# Patient Record
Sex: MALE | Race: WHITE | Employment: OTHER | URBAN - METROPOLITAN AREA
[De-identification: names, ages, dates, MRNs, and addresses within clinical notes are randomized per-mention and may not be internally consistent; named-entity substitution may affect disease eponyms.]

---

## 2017-08-07 ENCOUNTER — TRANSFERRED RECORDS (OUTPATIENT)
Dept: HEALTH INFORMATION MANAGEMENT | Facility: CLINIC | Age: 62
End: 2017-08-07

## 2019-09-20 ENCOUNTER — TRANSFERRED RECORDS (OUTPATIENT)
Dept: HEALTH INFORMATION MANAGEMENT | Facility: CLINIC | Age: 64
End: 2019-09-20

## 2019-09-30 ENCOUNTER — TRANSFERRED RECORDS (OUTPATIENT)
Dept: HEALTH INFORMATION MANAGEMENT | Facility: CLINIC | Age: 64
End: 2019-09-30

## 2019-09-30 ENCOUNTER — DOCUMENTATION ONLY (OUTPATIENT)
Dept: CARE COORDINATION | Facility: CLINIC | Age: 64
End: 2019-09-30

## 2019-10-01 NOTE — TELEPHONE ENCOUNTER
MEDICAL RECORDS REQUEST   [facility]  Urology Clinic  [address] [place] [address]  PHONE: [phone]  Fax: [phone]        FUTURE VISIT INFORMATION                                                   Zander Stallworth, : 1955 scheduled for future visit at [facility]    APPOINTMENT INFORMATION:    Date: 10/03/19 3PM     Provider:  Ousmane Ronquillo MD    Reason for Visit/Diagnosis: Second opinion for prostate CA & Elevated PSA     REFERRAL INFORMATION:    Referring provider:  N/A     Specialty: N/A    Referring providers clinic:  N/A    Clinic contact number:  N/A     RECORDS REQUESTED FOR VISIT                                                     NOTES  STATUS/DETAILS   OFFICE NOTE from referring provider  no   OFFICE NOTE from other specialist  yes   DISCHARGE SUMMARY from hospital  yes   DISCHARGE REPORT from the ER  yes   OPERATIVE REPORT  yes   MEDICATION LIST  yes   PROSTATE CANCER     BONE SCAN  in process   MRI OF PROSTATE  no   PATHOLOGY REPORT & SLIDES  in process   PSA (LAB)  Yes      PRE-VISIT CHECKLIST      Record collection complete If no, please explain: Called and spoke with pt for verbal consent for HP recs. (CE)   Fax to [facility] for slides and images - 10/1  Images at [facility]  Slides - Dr. Davies 2017   [place] #: [id number], [id number]      Appointment appropriately scheduled           (right time/right provider) Yes   MyChart activation If no, please explain: In process    Questionnaire complete If no, please explain: In process      Completed by: Ev Barrientos

## 2019-10-02 ENCOUNTER — TRANSFERRED RECORDS (OUTPATIENT)
Dept: HEALTH INFORMATION MANAGEMENT | Facility: CLINIC | Age: 64
End: 2019-10-02

## 2019-10-03 ENCOUNTER — OFFICE VISIT (OUTPATIENT)
Dept: UROLOGY | Facility: CLINIC | Age: 64
End: 2019-10-03
Payer: MEDICARE

## 2019-10-03 VITALS
DIASTOLIC BLOOD PRESSURE: 67 MMHG | HEIGHT: 70 IN | HEART RATE: 52 BPM | WEIGHT: 178 LBS | BODY MASS INDEX: 25.48 KG/M2 | SYSTOLIC BLOOD PRESSURE: 116 MMHG

## 2019-10-03 DIAGNOSIS — C61 MALIGNANT NEOPLASM OF PROSTATE (H): Primary | ICD-10-CM

## 2019-10-03 RX ORDER — FLUTICASONE PROPIONATE 50 MCG
2 SPRAY, SUSPENSION (ML) NASAL DAILY
COMMUNITY
Start: 2019-03-01

## 2019-10-03 RX ORDER — HYDROCORTISONE 2.5 %
CREAM (GRAM) TOPICAL
COMMUNITY
Start: 2018-07-25 | End: 2019-12-17

## 2019-10-03 RX ORDER — FEXOFENADINE HCL 60 MG/1
60 TABLET, FILM COATED ORAL EVERY MORNING
COMMUNITY
Start: 2010-09-22

## 2019-10-03 RX ORDER — TADALAFIL 5 MG/1
TABLET ORAL
Refills: 9 | COMMUNITY
Start: 2019-02-20 | End: 2020-12-16

## 2019-10-03 RX ORDER — SIMVASTATIN 10 MG
TABLET ORAL AT BEDTIME
COMMUNITY
Start: 2019-09-24

## 2019-10-03 ASSESSMENT — MIFFLIN-ST. JEOR: SCORE: 1598.65

## 2019-10-03 ASSESSMENT — PAIN SCALES - GENERAL: PAINLEVEL: NO PAIN (0)

## 2019-10-03 NOTE — LETTER
Date:October 16, 2019      Patient was self referred, no letter generated. Do not send.        [facility] Information

## 2019-10-03 NOTE — NURSING NOTE
"Chief Complaint   Patient presents with     Consult     Second opinion for prostate CA & Elevated PSA        Blood pressure 116/67, pulse 52, height 1.778 m (5' 10\"), weight 80.7 kg (178 lb). Body mass index is 25.54 kg/m .    Patient Active Problem List   Diagnosis     Acquired deformity of ankle and foot     Blepharitis     Dry eyes     Herpes simplex disciform keratitis     Hypercholesterolemia     Lesion of plantar nerve     Malabsorption of glucose     Malignant neoplasm of prostate (H)     Metatarsalgia     Presbyopia     Sensorineural hearing loss, bilateral       Allergies   Allergen Reactions     Amoxicillin-Pot Clavulanate Other (See Comments)     Itching      Erythromycin      Sildenafil Other (See Comments)     Visual changes with sildenafil.        Current Outpatient Medications   Medication Sig Dispense Refill     fexofenadine (ALLEGRA) 60 MG tablet Take 60 mg by mouth       fluticasone (FLONASE) 50 MCG/ACT nasal spray 2 sprays       hydrocortisone 2.5 % cream Apply to inflamed skin 2-3 times daily until clear but not more than 2-3 weeks at a time       metFORMIN (GLUCOPHAGE) 500 MG tablet Take 500 mg by mouth       Multiple Vitamins-Minerals (CENTRUM SILVER PO)        simvastatin (ZOCOR) 10 MG tablet TAKE 1 TABLET BY MOUTH DAILY.       tadalafil (CIALIS) 5 MG tablet TAKE 4 TABLETS BY MOUTH TWO TIMES PER WEEK AS NEEDED  9       Social History     Tobacco Use     Smoking status: Never Smoker     Smokeless tobacco: Never Used   Substance Use Topics     Alcohol use: Yes     Drug use: Not Currently     Types: Marijuana       PEDRO Singh  10/3/2019  3:34 PM     "

## 2019-10-03 NOTE — LETTER
10/3/2019       RE: Zander Stallworth  [address]  [facility] [address]     Dear Colleague,    Thank you for referring your patient, Zander Stallworth, to the [facility] UROLOGY AND [facility] FOR PROSTATE AND UROLOGIC CANCERS at [facility]. Please see a copy of my visit note below.      Urology Clinic    Ousmane Ronquillo MD  Date of Service: 10/03/2019     Name: Zander Stallworth  MRN: [id number]  Age: 63 year old  : 1955  Referring provider: Referred Self     Assessment and Plan:    Spoke extensively with patient and his wife regarding management options for rising PSA and recent PET scan suspicious for 2 pelvic lymph node involvement after radical prostatectomy 2017. Options included referral to medical oncologist for medication clinical trial, referral to radiation oncology (or resumption of care with previous radiation oncologist) for radiation and hormone therapy, and pelvic lymph node dissection surgery. Spoke about the risks and benefits regarding choosing each of these treatment options and what each option would look like in terms of hospital stay and recovery. Also spoke about the complications. The patient would like to be referred to a medical oncologist for more information regarding the clinical trial, and discuss with family, prior to making a decision. The patient understood and agreed with the plan moving forward.    -Refer to medical oncology for possible enrollment in clinical trial.    Deyanira Berrios MD  PGY-1 Urology    Attestation:  This patient was seen and evaluated by me, with the house staff team or resident(s).  I have reviewed the note above and agree.      Ousmane Ronquillo MD  Department of Urology  [facility]    I spent over 45 minutes with the patient.  Over half this time was spent on counseling.      ______________________________________________________________________    HPI  Zander Stallworth is a 63 year old male with history of  "prostate cancer s/p radical prostatectomy with bilateral pelvic lymph node dissection on 08/07/2017 now with rising PSA and PET scan (sept 2019) suspicious for metastatic prostate cancer involving two pelvic lymph nodes.    Treatment: 5/31/2017 TRUS/Bx nine of 12 cores, maximum [place] score 8 (5+3) fashion core involvement 60 percent, perineural invasion seen, PSA 2.7  6/21/2017 bone scan and CT scan negative  8/7/2017 dVP Dr. Mojica, Hallie score 7 (3+4) pT2c N0 M0, 52 g gland    PSA:   09/30/2019 0.2   08/29/2019 0.2   05/20/2019 0.1   02/14/2019 0.1   08/14/2018 <0.1   02/12/2018 <0.1   11/14/2017 <0.1   05/12/2017 2.7   06/10/2015 1.99   09/26/2014 1.65   04/23/2012 1.46   02/11/2011 1.52   11/17/2009 1.26   01/18/2008 1.25   07/02/2003 1.00     After surgery:  Initial PSA: 2.7.  Pathologic Hallie Score was 3 + 4.  Grade Group:2.  Biopsy [place] Score was 5 + 3.  Pathologic Stage [id number].   Positive Margins: No   Number of Lymph Nodes Removed: 3.  Number of Positive Lymph Nodes: 0.  Patient is status post robotic radical prostatectomy on 08/07/2017.      The patient is here for second opinion regarding radiation + hormone therapy. They are concerned about the hormone therapy and its increased risk of dementia (due to family history of Alzheimer's). The patient has no complaints regarding urination. Still having some mild leakage of urination.    Review of Systems:   Pertinent items are noted in HPI or as below, remainder of complete ROS is negative.    General Symptoms: No  Skin Symptoms: No  HENT Symptoms: No  EYE SYMPTOMS: No  HEART SYMPTOMS: No  LUNG SYMPTOMS: No  INTESTINAL SYMPTOMS: No  URINARY SYMPTOMS: No  REPRODUCTIVE SYMPTOMS: No  SKELETAL SYMPTOMS: No  BLOOD SYMPTOMS: No  NERVOUS SYSTEM SYMPTOMS: No  MENTAL HEALTH SYMPTOMS: No    Physical Exam:   Patient is a 63 year old  male   Vitals: Blood pressure 116/67, pulse 52, height 1.778 m (5' 10\"), weight 80.7 kg (178 lb).  General Appearance Adult: " Alert, no acute distress, oriented  HENT: throat/mouth:normal, good dentition  Lungs: no respiratory distress, or pursed lip breathing  Heart: No obvious jugular venous distension present  Abdomen: soft, nontender, no organomegaly or masses, Body mass index is 25.54 kg/m .,   Lymphatics: No cervical or supraclavicular adenopathy  Musculoskeltal: extremities normal, no peripheral edema  Skin: no visible suspicious lesions or rashes  Neuro: Alert, oriented, speech and mentation normal  Psych: affect and mood normal  Gait: Normal  : refer    Laboratory:   I reviewed all applicable laboratory and pathology data and went over findings with patient  Significant for     PSA:   09/30/2019 0.2   08/29/2019 0.2   05/20/2019 0.1   02/14/2019 0.1   08/14/2018 <0.1   02/12/2018 <0.1   11/14/2017 <0.1   05/12/2017 2.7   06/10/2015 1.99   09/26/2014 1.65   04/23/2012 1.46   02/11/2011 1.52   11/17/2009 1.26   01/18/2008 1.25   07/02/2003 1.00     Surgical pathology 08/07/2017:   Final Pathologic Diagnosis   A. Prostate, radical prostatectomy --       1.  Prostatic adenocarcinoma, acinar type, Hallie grade 2 (score   3 + 4 = 7 /10)       2.  Tumor involves bilateral lobes        3.  Margins negative for tumor       4.  Benign seminal vesicles       5.  Perineural invasion identified       6.  No extraprostatic extension identified       7.  See staging parameters below for further details   B.  Lymph nodes, bilateral pelvic, excision -- Three lymph nodes   negative for metastatic tumor   Prostate Cancer Staging Parameters       Specimen Type/organs: Radical prostatectomy and bilateral pelvic   nodes       Tumor Site: Bilateral lobes               Primary Tumor:       Histologic Type: Radical prostatectomy       Histologic Grade: Grade group 2       [place] Score: 3 + 4 = 7 / 10       Extent of Invasion (pT): Tumor involves bilateral lobes       Percentage Involvement: 10%       Periprostatic Fat Involvement: Not identified        Seminal Vesicle Involvement: Not identified       Margins: Negative       Perineural Invasion: Present       Lymphvascular Invasion: Not identified          Regional Lymph Nodes (pN):       Number of Nodes: Three pelvic nodes (right and left)       Number of Positive Nodes: Zero   Pathologic Stage (pTN) (AJCC 7th edition):  pT2c N0      Imaging:   I personally reviewed all applicable imaging and went over the below findings with patient.    PET scan 09/20/2019:     INDICATION: biochemical recurrence with rising PSA after prostatectomy 2017  COMPARISON: CT and whole-body bone scan from 06/21/2017 are reviewed.  TECHNIQUE: 3 to 5 minutes post intravenous administration of 12.1 mCi F-18 fluciclovine, PET imaging was performed from the skull base to mid thighs utilizing attenuation correction with concurrent axial CT and PET/CT image fusion. Dose reduction techniques were used.    FINDINGS: Fluciclovine-avid lymph nodes measuring 0.8 x 0.6 cm in the right internal iliac chain along the right pelvic sidewall and 0.5 x 0.4 cm in the right common iliac chain just anterior to the L5 vertebral body. Injection artifact right arm. Fluciclovine activity in the remainder of the body is normal.    Prostatectomy. Mild calcified atherosclerosis, including mid left anterior descending coronary disease. Pelvic phleboliths. Mild degenerative change in the spine.    CONCLUSION:  Findings suspicious for metastatic prostate cancer involving two pelvic lymph nodes  Per radiology.       Again, thank you for allowing me to participate in the care of your patient.      Sincerely,    Ousmane Ronquillo MD

## 2019-10-03 NOTE — PATIENT INSTRUCTIONS
Referral to Dr. Rivera.       It was a pleasure meeting with you today.  Thank you for allowing me and my team the privilege of caring for you today.  YOU are the reason we are here, and I truly hope we provided you with the excellent service you deserve.  Please let us know if there is anything else we can do for you so that we can be sure you are leaving completely satisfied with your care experience.

## 2019-10-03 NOTE — PROGRESS NOTES
Urology Clinic    Ousmane Ronquillo MD  Date of Service: 10/03/2019     Name: Zander Stallworth  MRN: [id number]  Age: 63 year old  : 1955  Referring provider: Referred Self     Assessment and Plan:    Spoke extensively with patient and his wife regarding management options for rising PSA and recent PET scan suspicious for 2 pelvic lymph node involvement after radical prostatectomy 2017. Options included referral to medical oncologist for medication clinical trial, referral to radiation oncology (or resumption of care with previous radiation oncologist) for radiation and hormone therapy, and pelvic lymph node dissection surgery. Spoke about the risks and benefits regarding choosing each of these treatment options and what each option would look like in terms of hospital stay and recovery. Also spoke about the complications. The patient would like to be referred to a medical oncologist for more information regarding the clinical trial, and discuss with family, prior to making a decision. The patient understood and agreed with the plan moving forward.    -Refer to medical oncology for possible enrollment in clinical trial.    Deyanira Berrios MD  PGY-1 Urology    Attestation:  This patient was seen and evaluated by me, with the house staff team or resident(s).  I have reviewed the note above and agree.      Ousmane Ronquillo MD  Department of Urology  [facility]    I spent over 45 minutes with the patient.  Over half this time was spent on counseling.      ______________________________________________________________________    HPI  Zander Stallworth is a 63 year old male with history of prostate cancer s/p radical prostatectomy with bilateral pelvic lymph node dissection on 2017 now with rising PSA and PET scan (2019) suspicious for metastatic prostate cancer involving two pelvic lymph nodes.    Treatment: 2017 TRUS/Bx nine of 12 cores, maximum Hallie score 8 (5+3) fashion core  "involvement 60 percent, perineural invasion seen, PSA 2.7  6/21/2017 bone scan and CT scan negative  8/7/2017 dVP Dr. Mojica, Hallie score 7 (3+4) pT2c N0 M0, 52 g gland    PSA:   09/30/2019 0.2   08/29/2019 0.2   05/20/2019 0.1   02/14/2019 0.1   08/14/2018 <0.1   02/12/2018 <0.1   11/14/2017 <0.1   05/12/2017 2.7   06/10/2015 1.99   09/26/2014 1.65   04/23/2012 1.46   02/11/2011 1.52   11/17/2009 1.26   01/18/2008 1.25   07/02/2003 1.00     After surgery:  Initial PSA: 2.7.  Pathologic [place] Score was 3 + 4.  Grade Group:2.  Biopsy Hallie Score was 5 + 3.  Pathologic Stage [id number].   Positive Margins: No   Number of Lymph Nodes Removed: 3.  Number of Positive Lymph Nodes: 0.  Patient is status post robotic radical prostatectomy on 08/07/2017.      The patient is here for second opinion regarding radiation + hormone therapy. They are concerned about the hormone therapy and its increased risk of dementia (due to family history of Alzheimer's). The patient has no complaints regarding urination. Still having some mild leakage of urination.    Review of Systems:   Pertinent items are noted in HPI or as below, remainder of complete ROS is negative.    General Symptoms: No  Skin Symptoms: No  HENT Symptoms: No  EYE SYMPTOMS: No  HEART SYMPTOMS: No  LUNG SYMPTOMS: No  INTESTINAL SYMPTOMS: No  URINARY SYMPTOMS: No  REPRODUCTIVE SYMPTOMS: No  SKELETAL SYMPTOMS: No  BLOOD SYMPTOMS: No  NERVOUS SYSTEM SYMPTOMS: No  MENTAL HEALTH SYMPTOMS: No    Physical Exam:   Patient is a 63 year old  male   Vitals: Blood pressure 116/67, pulse 52, height 1.778 m (5' 10\"), weight 80.7 kg (178 lb).  General Appearance Adult: Alert, no acute distress, oriented  HENT: throat/mouth:normal, good dentition  Lungs: no respiratory distress, or pursed lip breathing  Heart: No obvious jugular venous distension present  Abdomen: soft, nontender, no organomegaly or masses, Body mass index is 25.54 kg/m .,   Lymphatics: No cervical or " supraclavicular adenopathy  Musculoskeltal: extremities normal, no peripheral edema  Skin: no visible suspicious lesions or rashes  Neuro: Alert, oriented, speech and mentation normal  Psych: affect and mood normal  Gait: Normal  : refer    Laboratory:   I reviewed all applicable laboratory and pathology data and went over findings with patient  Significant for     PSA:   09/30/2019 0.2   08/29/2019 0.2   05/20/2019 0.1   02/14/2019 0.1   08/14/2018 <0.1   02/12/2018 <0.1   11/14/2017 <0.1   05/12/2017 2.7   06/10/2015 1.99   09/26/2014 1.65   04/23/2012 1.46   02/11/2011 1.52   11/17/2009 1.26   01/18/2008 1.25   07/02/2003 1.00     Surgical pathology 08/07/2017:   Final Pathologic Diagnosis   A. Prostate, radical prostatectomy --       1.  Prostatic adenocarcinoma, acinar type, [place] grade 2 (score   3 + 4 = 7 /10)       2.  Tumor involves bilateral lobes        3.  Margins negative for tumor       4.  Benign seminal vesicles       5.  Perineural invasion identified       6.  No extraprostatic extension identified       7.  See staging parameters below for further details   B.  Lymph nodes, bilateral pelvic, excision -- Three lymph nodes   negative for metastatic tumor   Prostate Cancer Staging Parameters       Specimen Type/organs: Radical prostatectomy and bilateral pelvic   nodes       Tumor Site: Bilateral lobes               Primary Tumor:       Histologic Type: Radical prostatectomy       Histologic Grade: Grade group 2       Hallie Score: 3 + 4 = 7 / 10       Extent of Invasion (pT): Tumor involves bilateral lobes       Percentage Involvement: 10%       Periprostatic Fat Involvement: Not identified       Seminal Vesicle Involvement: Not identified       Margins: Negative       Perineural Invasion: Present       Lymphvascular Invasion: Not identified          Regional Lymph Nodes (pN):       Number of Nodes: Three pelvic nodes (right and left)       Number of Positive Nodes: Zero   Pathologic Stage  (pTN) (AJCC 7th edition):  pT2c N0      Imaging:   I personally reviewed all applicable imaging and went over the below findings with patient.    PET scan 09/20/2019:     INDICATION: biochemical recurrence with rising PSA after prostatectomy 2017  COMPARISON: CT and whole-body bone scan from 06/21/2017 are reviewed.  TECHNIQUE: 3 to 5 minutes post intravenous administration of 12.1 mCi F-18 fluciclovine, PET imaging was performed from the skull base to mid thighs utilizing attenuation correction with concurrent axial CT and PET/CT image fusion. Dose reduction techniques were used.    FINDINGS: Fluciclovine-avid lymph nodes measuring 0.8 x 0.6 cm in the right internal iliac chain along the right pelvic sidewall and 0.5 x 0.4 cm in the right common iliac chain just anterior to the L5 vertebral body. Injection artifact right arm. Fluciclovine activity in the remainder of the body is normal.    Prostatectomy. Mild calcified atherosclerosis, including mid left anterior descending coronary disease. Pelvic phleboliths. Mild degenerative change in the spine.    CONCLUSION:  Findings suspicious for metastatic prostate cancer involving two pelvic lymph nodes  Per radiology.

## 2019-10-04 NOTE — TELEPHONE ENCOUNTER
ONCOLOGY INTAKE: Records Information      APPT INFORMATION: 10/09/19 - MALLORY Hoang - [facility] [place]  Referring provider:  JOE Weight  Referring provider s clinic:  [facility]  Reason for visit/diagnosis:  prostate cancer  Has patient been notified of appointment date and time?: Yes    RECORDS INFORMATION:  Were the records received with the referral (via [employer])? Internal referral    Has patient been seen for any external appt for this diagnosis? No    If yes, where? NA    Has patient had any imaging or procedures outside of [place]  [facility] for this condition? No      If Yes, where? NA    ADDITIONAL INFORMATION:  Scheduled via [employer] from Maggie ARCINIEGA called pt & confirmed appt    Referral forwarded to Heladio Kimball for Rad Onc appt

## 2019-10-09 ENCOUNTER — PRE VISIT (OUTPATIENT)
Dept: ONCOLOGY | Facility: CLINIC | Age: 64
End: 2019-10-09

## 2019-10-09 ENCOUNTER — ONCOLOGY VISIT (OUTPATIENT)
Dept: ONCOLOGY | Facility: CLINIC | Age: 64
End: 2019-10-09
Attending: UROLOGY
Payer: MEDICARE

## 2019-10-09 ENCOUNTER — PRE VISIT (OUTPATIENT)
Dept: UROLOGY | Facility: CLINIC | Age: 64
End: 2019-10-09

## 2019-10-09 VITALS
WEIGHT: 172 LBS | HEIGHT: 69 IN | BODY MASS INDEX: 25.48 KG/M2 | SYSTOLIC BLOOD PRESSURE: 112 MMHG | OXYGEN SATURATION: 98 % | HEART RATE: 61 BPM | DIASTOLIC BLOOD PRESSURE: 65 MMHG | RESPIRATION RATE: 14 BRPM | TEMPERATURE: 98.3 F

## 2019-10-09 DIAGNOSIS — C61 MALIGNANT NEOPLASM OF PROSTATE (H): ICD-10-CM

## 2019-10-09 PROCEDURE — G0463 HOSPITAL OUTPT CLINIC VISIT: HCPCS | Mod: ZF

## 2019-10-09 PROCEDURE — 99204 OFFICE O/P NEW MOD 45 MIN: CPT | Mod: GC | Performed by: INTERNAL MEDICINE

## 2019-10-09 ASSESSMENT — PAIN SCALES - GENERAL: PAINLEVEL: NO PAIN (0)

## 2019-10-09 ASSESSMENT — MIFFLIN-ST. JEOR: SCORE: 1562.69

## 2019-10-09 NOTE — LETTER
10/9/2019       RE: Zander Stallworth  [address]  [facility] [address]     Dear Colleague,    Thank you for referring your patient, Zander Stallworth, to the [facility]. Please see a copy of my visit note below.    MEDICAL ONCOLOGY CLINIC NOTE    PATIENT NAME: Zander Stallworth  ENCOUNTER DATE: 10/9/2019  REFERRING PROVIDER: Dr. Ronquillo ([facility])    REASON FOR CURRENT VISIT: Recurrent prostate cancer    HISTORY OF PRESENT ILLNESS:  Mr. Zander Stallworth is a 63 year old  male referred by Dr. Ronquillo of Urology for evaluation of rising PSA and concern for metastatic prostate cancer. He was diagnosed with prostate cancer initially in 2017 after an abnormal FIDE. TRUS with biopsy revealed [place] 5+3=8 prostate adenocarcinoma in 9 of 12 cores (invovling 60% of the tissue), with perineural invasion. PSA was 2.7 at that time. CT and bone scan showed no evidence of metastatic disease. He underwent radical prostatectomy on 8/7/17, pathology consistent with [place] 3+4, [id number]. PSA was undetectable after surgery, until February 2019 when PSA was found to be 0.1, up to 0.2 on 8/29/19 and stable at 0.2 on 9/30/19. PET/CT on 9/20/19 revealed findings suspicious for metastatic prostate cancer involving hypermetabolic lymph nodes measuring 0.8 x 0.6 cm in the right internal iliac chain along the right pelvic sidewall and 0.5 x 0.4 cm in the right common iliac chain just anterior to the L5 vertebral body. He was seen by Dr. Ronquillo in urology on 10/3/19 and discussed treatment options including radiation, hormone therapy and pelvic lymph node dissection.    He presents to clinic today with his wife seeking a second opinion. He is overall feeling great. He has no signs/symptoms of disease progression. No abdominal pain, bowel problems, or urinary complaints. He is retired and maintains a very active lifestyle, walking up to 5 miles per day. His father had dementia in his late 60s, as did his paternal grandfather. He and his  wife are particularly concerned about the risk of dementia associated with hormonal therapy.    REVIEW OF SYSTEMS:  A full 14 point ROS was negative other than the symptoms noted above in the HPI.    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     Hypercholesterolemia 11/27/2009     Lesion of plantar nerve 04/02/2015     Malabsorption of glucose 11/18/2009     Malignant neoplasm of prostate (H) 06/13/2017     Metatarsalgia 04/02/2015     Presbyopia 11/09/2010     Sensorineural hearing loss, bilateral 06/14/2012     Resolved Ambulatory Problems     Diagnosis Date Noted     No Resolved Ambulatory Problems     No Additional Past Medical History     PAST SURGICAL HISTORY:  Past Surgical History:   Procedure Laterality Date     PROSTATE SURGERY        SOCIAL HISTORY:   Social History     Tobacco Use     Smoking status: Never Smoker     Smokeless tobacco: Never Used   Substance Use Topics     Alcohol use: Yes     Drug use: Not Currently     Types: Marijuana     FAMILY HISTORY:  - Father had dementia in his late 60s, as did his paternal grandfather.  - Father had colon cancer and later lung cancer.    ALLERGIES:  Allergies   Allergen Reactions     Amoxicillin-Pot Clavulanate Other (See Comments)     Itching      Erythromycin      Sildenafil Other (See Comments)     Visual changes with sildenafil.      CURRENT MEDICATIONS:  Current Outpatient Medications:      fexofenadine (ALLEGRA) 60 MG tablet, Take 60 mg by mouth, Disp: , Rfl:      fluticasone (FLONASE) 50 MCG/ACT nasal spray, 2 sprays, Disp: , Rfl:      hydrocortisone 2.5 % cream, Apply to inflamed skin 2-3 times daily until clear but not more than 2-3 weeks at a time, Disp: , Rfl:      metFORMIN (GLUCOPHAGE) 500 MG tablet, Take 500 mg by mouth, Disp: , Rfl:      Multiple Vitamins-Minerals  "(CENTRUM SILVER PO), , Disp: , Rfl:      simvastatin (ZOCOR) 10 MG tablet, TAKE 1 TABLET BY MOUTH DAILY., Disp: , Rfl:      tadalafil (CIALIS) 5 MG tablet, TAKE 4 TABLETS BY MOUTH TWO TIMES PER WEEK AS NEEDED, Disp: , Rfl: 9    PHYSICAL EXAMINATION:  Vital signs: /65   Pulse 61   Temp 98.3  F (36.8  C) (Oral)   Resp 14   Ht 1.764 m (5' 9.45\")   Wt 78 kg (172 lb)   SpO2 98%   BMI 25.07 kg/m     ECOG performance status of 0.   GENERAL: Well-nourished healthy-appearing man, seated in chair, no acute distress.   HEENT: No icterus, no pallor. Moist mucous membranes. Oropharynx is clear.   NECK: Supple, no JVD/LAD.  LUNGS: Clear to ausculation bilaterally, normal work of breathing.   CARDIOVASCULAR: Regular rate and rhythm, no murmurs, gallops or rubs.   ABDOMEN: Soft, nontender and nondistended, no palpable masses, bowel sounds present.  EXTREMITIES: No cyanosis, no clubbing, no edema.   NEUROLOGIC: Alert and oriented. No focal deficits.     LABORATORY DATA:  Reviewed most recent labs from [facility] (in Care Everywhere):  - PSA was 0.2 on 9/30/19  - Essentially normal BMP on 5/20/19    IMAGING STUDIES:  Reviewed Axumin PET Scan from 9/20/19:  - Notable only for two fluciclovine-avid lymph nodes measuring 0.8 x 0.6 cm in the right internal iliac chain along the right pelvic side wall and 0.5 x 0.4 cm in the right common iliac chain just anterior to the L5 vertebral body.  - Findings suspicious for recurrent prostate cancer involving the pelvic lymph nodes.    ASSESSMENT AND PLAN:  Zander Stallworth is a 63 year old man with locally recurrent prostate cancer, here seeking additional guidance regarding his treatment options. He is very well informed and has done much research on his own regarding this topic. He is particularly worried about risk of dementia with hormonal therapy.    We had a long discussion with the patient and his wife regarding his initial diagnosis of intermediate risk prostate cancer " "([place] 3+4), disease course following radical prostatectomy, and the implications of recent PSA levels and results of the recent Axumin PET scan.     We suspect this is a regional recurrence of localized prostate cancer rather than truly metastatic disease. As such, we still feel that he may achieve \"cure\" with additional localized treatment such as lymph node dissection and/or radiation. He has already met with Dr. Ronquillo (urology) and discussed surgical options. He has a referral to rad-onc, but has not yet been able to schedule and appointment with Dr. Schreiber. We do believe that he should at least meet with rad-onc to discuss risks/benefits of salvage radiation, and get Dr. Young's opinion regarding lymph node dissection prior to radiation. He may also consider ADT prior to radiation, as this is known to improve outcomes, but would favor a set short course (such as 6-18 months), rather than indefinite.    We will not schedule formal follow-up in our clinic, though would be happy to see him back if questions or concerns arise.    Patient was seen and plan was discussed with Dr. Abelardo Hoang.    Lisa Watters MD/PhD  Heme/Onc Fellow      ATTENDING ATTESTATION NOTE:  I saw the patient with Dr. Watters, hematology/oncology fellow and discussed all pertinent clinical data including lab, imaging and path reports with the patient and family. The plan above was made under my supervision and accurately reflects the A/P after my examination and discussion with the patient.      BILLIN. A total of 50 mins was spent in this encounter including > 50% face-to-face time.     Abelardo Hoang M.D.  Asst. Professor of Medicine  Genitourinary Oncology  Division of Hematology, Oncology & Transplantation  [facility]          "

## 2019-10-09 NOTE — NURSING NOTE
"Oncology Rooming Note    October 9, 2019 4:56 PM   Zander Stallworth is a 63 year old male who presents for:    Chief Complaint   Patient presents with     Oncology Clinic Visit     New; Prostate Ca     Initial Vitals: /65   Pulse 61   Temp 98.3  F (36.8  C) (Oral)   Resp 14   Ht 1.764 m (5' 9.45\")   Wt 78 kg (172 lb)   SpO2 98%   BMI 25.07 kg/m   Estimated body mass index is 25.07 kg/m  as calculated from the following:    Height as of this encounter: 1.764 m (5' 9.45\").    Weight as of this encounter: 78 kg (172 lb). Body surface area is 1.95 meters squared.  No Pain (0) Comment: Data Unavailable   No LMP for male patient.  Allergies reviewed: Yes  Medications reviewed: Yes    Medications: Medication refills not needed today.  Pharmacy name entered into [employer]: [id number] [place] [address]    Clinical concerns: Transfer of care for prostate cancer       Rowena Galindo CMA              "

## 2019-10-09 NOTE — PROGRESS NOTES
MEDICAL ONCOLOGY CLINIC NOTE    PATIENT NAME: Zander Stallworth  ENCOUNTER DATE: 10/9/2019  REFERRING PROVIDER: Dr. Ronquillo ([facility])    REASON FOR CURRENT VISIT: Recurrent prostate cancer    HISTORY OF PRESENT ILLNESS:  Mr. Zander Stallworth is a 63 year old  male referred by Dr. Ronquillo of Urology for evaluation of rising PSA and concern for metastatic prostate cancer. He was diagnosed with prostate cancer initially in 2017 after an abnormal FIDE. TRUS with biopsy revealed [place] 5+3=8 prostate adenocarcinoma in 9 of 12 cores (invovling 60% of the tissue), with perineural invasion. PSA was 2.7 at that time. CT and bone scan showed no evidence of metastatic disease. He underwent radical prostatectomy on 8/7/17, pathology consistent with [place] 3+4, [id number]. PSA was undetectable after surgery, until February 2019 when PSA was found to be 0.1, up to 0.2 on 8/29/19 and stable at 0.2 on 9/30/19. PET/CT on 9/20/19 revealed findings suspicious for metastatic prostate cancer involving hypermetabolic lymph nodes measuring 0.8 x 0.6 cm in the right internal iliac chain along the right pelvic sidewall and 0.5 x 0.4 cm in the right common iliac chain just anterior to the L5 vertebral body. He was seen by Dr. Ronquillo in urology on 10/3/19 and discussed treatment options including radiation, hormone therapy and pelvic lymph node dissection.    He presents to clinic today with his wife seeking a second opinion. He is overall feeling great. He has no signs/symptoms of disease progression. No abdominal pain, bowel problems, or urinary complaints. He is retired and maintains a very active lifestyle, walking up to 5 miles per day. His father had dementia in his late 60s, as did his paternal grandfather. He and his wife are particularly concerned about the risk of dementia associated with hormonal therapy.    REVIEW OF SYSTEMS:  A full 14 point ROS was negative other than the symptoms noted above in the HPI.    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     Hypercholesterolemia 11/27/2009     Lesion of plantar nerve 04/02/2015     Malabsorption of glucose 11/18/2009     Malignant neoplasm of prostate (H) 06/13/2017     Metatarsalgia 04/02/2015     Presbyopia 11/09/2010     Sensorineural hearing loss, bilateral 06/14/2012     Resolved Ambulatory Problems     Diagnosis Date Noted     No Resolved Ambulatory Problems     No Additional Past Medical History     PAST SURGICAL HISTORY:  Past Surgical History:   Procedure Laterality Date     PROSTATE SURGERY        SOCIAL HISTORY:   Social History     Tobacco Use     Smoking status: Never Smoker     Smokeless tobacco: Never Used   Substance Use Topics     Alcohol use: Yes     Drug use: Not Currently     Types: Marijuana     FAMILY HISTORY:  - Father had dementia in his late 60s, as did his paternal grandfather.  - Father had colon cancer and later lung cancer.    ALLERGIES:  Allergies   Allergen Reactions     Amoxicillin-Pot Clavulanate Other (See Comments)     Itching      Erythromycin      Sildenafil Other (See Comments)     Visual changes with sildenafil.      CURRENT MEDICATIONS:  Current Outpatient Medications:      fexofenadine (ALLEGRA) 60 MG tablet, Take 60 mg by mouth, Disp: , Rfl:      fluticasone (FLONASE) 50 MCG/ACT nasal spray, 2 sprays, Disp: , Rfl:      hydrocortisone 2.5 % cream, Apply to inflamed skin 2-3 times daily until clear but not more than 2-3 weeks at a time, Disp: , Rfl:      metFORMIN (GLUCOPHAGE) 500 MG tablet, Take 500 mg by mouth, Disp: , Rfl:      Multiple Vitamins-Minerals (CENTRUM SILVER PO), , Disp: , Rfl:      simvastatin (ZOCOR) 10 MG tablet, TAKE 1 TABLET BY MOUTH DAILY., Disp: , Rfl:      tadalafil (CIALIS) 5 MG tablet, TAKE 4 TABLETS BY MOUTH TWO TIMES PER WEEK AS NEEDED, Disp: , Rfl:  "9    PHYSICAL EXAMINATION:  Vital signs: /65   Pulse 61   Temp 98.3  F (36.8  C) (Oral)   Resp 14   Ht 1.764 m (5' 9.45\")   Wt 78 kg (172 lb)   SpO2 98%   BMI 25.07 kg/m    ECOG performance status of 0.   GENERAL: Well-nourished healthy-appearing man, seated in chair, no acute distress.   HEENT: No icterus, no pallor. Moist mucous membranes. Oropharynx is clear.   NECK: Supple, no JVD/LAD.  LUNGS: Clear to ausculation bilaterally, normal work of breathing.   CARDIOVASCULAR: Regular rate and rhythm, no murmurs, gallops or rubs.   ABDOMEN: Soft, nontender and nondistended, no palpable masses, bowel sounds present.  EXTREMITIES: No cyanosis, no clubbing, no edema.   NEUROLOGIC: Alert and oriented. No focal deficits.     LABORATORY DATA:  [facility]  - PSA was 0.2 on 9/30/19  - Essentially normal BMP on 5/20/19    IMAGING STUDIES:  Reviewed Axumin PET Scan from 9/20/19:  - Notable only for two fluciclovine-avid lymph nodes measuring 0.8 x 0.6 cm in the right internal iliac chain along the right pelvic side wall and 0.5 x 0.4 cm in the right common iliac chain just anterior to the L5 vertebral body.  - Findings suspicious for recurrent prostate cancer involving the pelvic lymph nodes.    ASSESSMENT AND PLAN:  Zander Stallworth is a 63 year old man with locally recurrent prostate cancer, here seeking additional guidance regarding his treatment options. He is very well informed and has done much research on his own regarding this topic. He is particularly worried about risk of dementia with hormonal therapy.    We had a long discussion with the patient and his wife regarding his initial diagnosis of intermediate risk prostate cancer ([place] 3+4), disease course following radical prostatectomy, and the implications of recent PSA levels and results of the recent Axumin PET scan.     We suspect this is a regional recurrence of localized prostate cancer rather " "than truly metastatic disease. As such, we still feel that he may achieve \"cure\" with additional localized treatment such as lymph node dissection and/or radiation. He has already met with Dr. Ronquillo (urology) and discussed surgical options. He has a referral to rad-onc, but has not yet been able to schedule and appointment with Dr. Schreiber. We do believe that he should at least meet with rad-onc to discuss risks/benefits of salvage radiation, and get Dr. Young's opinion regarding lymph node dissection prior to radiation. He may also consider ADT prior to radiation, as this is known to improve outcomes, but would favor a set short course (such as 6-18 months), rather than indefinite.    We will not schedule formal follow-up in our clinic, though would be happy to see him back if questions or concerns arise.    Patient was seen and plan was discussed with Dr. Abelardo Hoang.    Lisa Watters MD/PhD  Heme/Onc Fellow      ATTENDING ATTESTATION NOTE:  I saw the patient with Dr. Watters, hematology/oncology fellow and discussed all pertinent clinical data including lab, imaging and path reports with the patient and family. The plan above was made under my supervision and accurately reflects the A/P after my examination and discussion with the patient.      BILLIN. A total of 50 mins was spent in this encounter including > 50% face-to-face time.     Abelardo Hoang M.D.  . Professor of Medicine  Genitourinary Oncology  Division of Hematology, Oncology & Transplantation  [facility]        "

## 2019-10-16 ENCOUNTER — OFFICE VISIT (OUTPATIENT)
Dept: RADIATION ONCOLOGY | Facility: CLINIC | Age: 64
End: 2019-10-16
Attending: RADIOLOGY
Payer: MEDICARE

## 2019-10-16 VITALS
BODY MASS INDEX: 26.09 KG/M2 | DIASTOLIC BLOOD PRESSURE: 68 MMHG | HEART RATE: 59 BPM | SYSTOLIC BLOOD PRESSURE: 110 MMHG | WEIGHT: 179 LBS

## 2019-10-16 DIAGNOSIS — C61 MALIGNANT NEOPLASM OF PROSTATE (H): Primary | ICD-10-CM

## 2019-10-16 PROCEDURE — G0463 HOSPITAL OUTPT CLINIC VISIT: HCPCS | Performed by: RADIOLOGY

## 2019-10-16 ASSESSMENT — ENCOUNTER SYMPTOMS
COUGH: 0
NAUSEA: 0
BLURRED VISION: 0
DIARRHEA: 0
CHILLS: 0
HEADACHES: 0
FEVER: 0
VOMITING: 0
WEIGHT LOSS: 1
NERVOUS/ANXIOUS: 0
DIZZINESS: 0
DEPRESSION: 0
WHEEZING: 0
SHORTNESS OF BREATH: 0
BACK PAIN: 0
FREQUENCY: 0
FALLS: 0
NECK PAIN: 0
ABDOMINAL PAIN: 0
HEARTBURN: 0
CONSTIPATION: 0
SORE THROAT: 0

## 2019-10-16 NOTE — PROGRESS NOTES
HPI    INITIAL PATIENT ASSESSMENT    Diagnosis: recurrent prostate cancer    Prior radiation therapy: None    Prior chemotherapy: None    Prior hormonal therapy:No    Pain Eval:  Denies    Psychosocial  Living arrangements: wife  Fall Risk: independent   referral needs: Not needed    Advanced Directive: Yes - Location: chart  Implantable Cardiac Device? No    Onset of menarche:   LMP: No LMP for male patient.  Onset of menopause:   Abnormal vaginal bleeding/discharge:   Are you pregnant?   Reproductive note: 3 grown children    Nurse face-to-face time: Level 4:  15 min face to face time  Review of Systems   Constitutional: Positive for weight loss. Negative for chills, fever and malaise/fatigue.        35 lb wt loss over a year   intentional   HENT: Negative for congestion, hearing loss, sore throat and tinnitus.    Eyes: Negative for blurred vision.   Respiratory: Negative for cough, shortness of breath and wheezing.    Cardiovascular: Negative for chest pain and leg swelling.   Gastrointestinal: Negative for abdominal pain, constipation, diarrhea, heartburn, nausea and vomiting.   Genitourinary: Negative for frequency and urgency.   Musculoskeletal: Negative for back pain, falls and neck pain.   Skin: Negative for itching and rash.   Neurological: Negative for dizziness and headaches.   Endo/Heme/Allergies: Negative for environmental allergies.   Psychiatric/Behavioral: Negative for depression. The patient is not nervous/anxious.

## 2019-10-16 NOTE — LETTER
10/16/2019       RE: Zander Stallworth  [address]  [facility] [address]     Dear Colleague,    Thank you for referring your patient, Zander Stallworth, to the RADIATION ONCOLOGY CLINIC. Please see a copy of my visit note below.    RADIATION ONCOLOGY CONSULTATION  DATE:  October 16, 2019    PATIENT NAME: Zander Stallworth  MEDICAL RECORD NUMBER: [id number]  REFERRING PHYSICIAN:  Dr. Ronquillo    REASON FOR CONSULTATION: Consideration of salvage RT for management of recurrent prostate cancer s/p RALP and BLND in MAY 2017, preop PSA 2.7 and Postop PSA 0.2. Axumin PET CT showed FDG avid right common and internal iliac nodes.     HISTORY OF PRESENT ILLNESS: Mr. Zander Stallworth is a 63-year-old gentleman with a diagnosis of recurrent prostate cancer. He initially was found to have a prostate nodule on routine physical exam in May 2017.His PSA at that time was 2.7. He was referred to Dr. Jeffrey at Urology on 5/22/2017 for further evaluation. He underwent a TRUS guided biopsy on 5/31/2017. The pathology ([id number]) showed adenocarcinoma involving 9/12 cores. Craig's score 8 (5+3) was involving the right middle core biopsy, Hallie's score 7(4+3) was involving the right apex core biopsy, Hallie's score 7 (3+4) was involving right lateral base, left base, and left middle core biopsies, while Craig's score 6 (3+3) was involving the left apex, left middle lateral, left base lateral, and right base core biopsies. He had a staging CT abdomen pelvis and bone scan which showed no evidence of metastatic disease. The patient then underwent robotic assisted radical prostatectomy with bilateral pelvic lymph node dissection on 8/7/2017. The surgical pathology showed adenocarcinoma, acinar type, the tumor was involving bilateral lobes. There was no evidence of extraprostatic extension, seminal vesicle invasion or positive surgical margins. 0/3 dissected nodes were positive for carcinoma. The final stage was pT2cN0.     His postoperative PSA remained  undetectable until it was 0.1 on 2/15/2019 and mariano up to 0.2 on 8/29/2019. The patient then underwent an Axumin PET/CT on 9/20/2019 which showed hypermetabolic right internal iliac node measuring 0.8 x 0.6 cm as well as a hypermetabolic right common iliac node measuring 0.5 x 0.4 cm suspicious for metastatic disease. The patient was referred to Dr. Quezada at [facility] on 9/25/2019 who recommended salvage radiotherapy along with long-term ADT.    The patient met with Dr. Ronquillo for second opinion on 9/3/219 at which time different management options were discussed including salvage radiotherapy, hormonal treatment and pelvic lymph node dissection. He was seen by Dr. Hoang at Medical Oncology on 10/9/19 who referred the patient to us for discussion of salvage radiotherapy.    On interview today, he was doing well.  He denied any urinary symptoms including frequency, urgency, nocturia, hematuria, dysuria, bowel symptoms, fever, chills, weight or appetite changes. His KPS is 90%. His AUA score is  2/35 and LILY score is 1/25.      HISTORY OF PRIOR RT: None    HISTORY OF PRIOR SYSTEMIC THERAPY: None     PMH:   Past Medical History:   Diagnosis Date     Prostate cancer (H)      PSH:  Past Surgical History:   Procedure Laterality Date     PROSTATE SURGERY       MEDICATIONS:  Current Outpatient Medications   Medication Sig Dispense Refill     fexofenadine (ALLEGRA) 60 MG tablet Take 60 mg by mouth       fluticasone (FLONASE) 50 MCG/ACT nasal spray 2 sprays       hydrocortisone 2.5 % cream Apply to inflamed skin 2-3 times daily until clear but not more than 2-3 weeks at a time       metFORMIN (GLUCOPHAGE) 500 MG tablet Take 500 mg by mouth       Multiple Vitamins-Minerals (CENTRUM SILVER PO)        simvastatin (ZOCOR) 10 MG tablet TAKE 1 TABLET BY MOUTH DAILY.       tadalafil (CIALIS) 5 MG tablet TAKE 4 TABLETS BY MOUTH TWO TIMES PER WEEK AS NEEDED  9     ALLERGY:  Allergies   Allergen Reactions      Amoxicillin-Pot Clavulanate      Itching      Erythromycin      Sildenafil      Visual changes with sildenafil.      FAMILY HISTORY:  Family History   Problem Relation Age of Onset     Cancer Father      Prostate Cancer Father      Colon Cancer Father      SOCIAL HISTORY:  Social History     Tobacco Use     Smoking status: Never Smoker     Smokeless tobacco: Never Used   Substance Use Topics     Alcohol use: Yes     ROS: 10 point ROS reviewed as reported on the nursing assessment note.    PHYSICAL EXAMINATION:    Gen: Alert, in NAD    IMPRESSION: A 63 year old recurrent prostate cancer s/p RALP and BLND in 5/2017. Postop PSA 0.2. Axumin PET CT showed FDG avid right common and internal iliac nodes.      RECOMMENDATION: We discussed with the patient and his wife the Axumin PET/CT results. It is uncertain that the 2 FDG avid lymph nodes represent a true metastatic disease and they may represent false positive result.     We discussed at length the possible management options of his disease. The first option is surgical removal for confirmation these lymph nodes.  If the pathology shows isolated pelvic LN metastasis, we would consider a course of salvage radiotherapy directed to the pelvic nodes and prostate bed.    If the pathology is negative for carcinoma, we would consider a course of salvage radiotherapy targeting the prostate bed alone. If surgery is not possible, a PSMA PET scan for further characterization given its higher sensitivity in lower PSA values.     The third option is salvage radiotherapy along with indefinite hormonal treatment for optimal local control and survival outcomes.The treatment will be delivered over 7 weeks targeting the prostate bed/ and pelvic lymph nodes.     We discussed the rationale, logistics, side effects associated with radiotherapy including fatigue, dysuria, urinary frequency, rectal urgency, diarrhea, radiation cystitis, urethral stricture, radiation proctitis, bowel  obstruction, fistula, and secondary malignancies and hormonal related toxicities including impotence, decreased libido, fatigue, weight gain, hot flashes, cognitive changes, dementia,      depression, osteoporosis, and potentially cardiovascular disease. We  discussed possible risk of ADT induced dementia.     At end of discussion, the patient will take some time to think about these options. He will inform us about his decision later. We spent 40 min with the patient, >50% devoted to counseling. The patient and his wife have many questions during our conversation that were answered to their satisfaction and verbalized understanding.     The patient was seen and discussed with staff, Dr. Schreiber. Thank you for involving us in the care of this patient.  Please feel free to contact us with questions or concerns at any time.    Jose Briseno MD  PGY-3 Resident, Radiation Oncology  [facility]    I saw the patient with the resident.  I agree with the resident's note and plan of care.      ABRAHAM Schreiber M.D.  Department of Radiation Oncology  [facility]           HPI    INITIAL PATIENT ASSESSMENT    Diagnosis: recurrent prostate cancer    Prior radiation therapy: None    Prior chemotherapy: None    Prior hormonal therapy:No    Pain Eval:  Denies    Psychosocial  Living arrangements: wife  Fall Risk: independent   referral needs: Not needed    Advanced Directive: Yes - Location: chart  Implantable Cardiac Device? No    Onset of menarche:   LMP: No LMP for male patient.  Onset of menopause:   Abnormal vaginal bleeding/discharge:   Are you pregnant?   Reproductive note: 3 grown children    Nurse face-to-face time: Level 4:  15 min face to face time  Review of Systems   Constitutional: Positive for weight loss. Negative for chills, fever and malaise/fatigue.        35 lb wt loss over a year   intentional   HENT: Negative for congestion, hearing loss, sore  throat and tinnitus.    Eyes: Negative for blurred vision.   Respiratory: Negative for cough, shortness of breath and wheezing.    Cardiovascular: Negative for chest pain and leg swelling.   Gastrointestinal: Negative for abdominal pain, constipation, diarrhea, heartburn, nausea and vomiting.   Genitourinary: Negative for frequency and urgency.   Musculoskeletal: Negative for back pain, falls and neck pain.   Skin: Negative for itching and rash.   Neurological: Negative for dizziness and headaches.   Endo/Heme/Allergies: Negative for environmental allergies.   Psychiatric/Behavioral: Negative for depression. The patient is not nervous/anxious.        Again, thank you for allowing me to participate in the care of your patient.      Sincerely,    Doug Schreiber MD

## 2019-10-16 NOTE — PROGRESS NOTES
RADIATION ONCOLOGY CONSULTATION  DATE:  October 16, 2019    PATIENT NAME: Zander Stallworth  MEDICAL RECORD NUMBER: [id number]  REFERRING PHYSICIAN:  Dr. Ronquillo    REASON FOR CONSULTATION: Consideration of salvage RT for management of recurrent prostate cancer s/p RALP and BLND in MAY 2017, preop PSA 2.7 and Postop PSA 0.2. Axumin PET CT showed FDG avid right common and internal iliac nodes.     HISTORY OF PRESENT ILLNESS: Mr. Zander Stallworth is a 63-year-old gentleman with a diagnosis of recurrent prostate cancer. He initially was found to have a prostate nodule on routine physical exam in May 2017.His PSA at that time was 2.7. He was referred to Dr. Jeffrey at Urology on 5/22/2017 for further evaluation. He underwent a TRUS guided biopsy on 5/31/2017. The pathology ([id number]) showed adenocarcinoma involving 9/12 cores. [place]'s score 8 (5+3) was involving the right middle core biopsy, Hallie's score 7(4+3) was involving the right apex core biopsy, [place]'s score 7 (3+4) was involving right lateral base, left base, and left middle core biopsies, while Hallie's score 6 (3+3) was involving the left apex, left middle lateral, left base lateral, and right base core biopsies. He had a staging CT abdomen pelvis and bone scan which showed no evidence of metastatic disease. The patient then underwent robotic assisted radical prostatectomy with bilateral pelvic lymph node dissection on 8/7/2017. The surgical pathology showed adenocarcinoma, acinar type, the tumor was involving bilateral lobes. There was no evidence of extraprostatic extension, seminal vesicle invasion or positive surgical margins. 0/3 dissected nodes were positive for carcinoma. The final stage was pT2cN0.     His postoperative PSA remained undetectable until it was 0.1 on 2/15/2019 and mariano up to 0.2 on 8/29/2019. The patient then underwent an Axumin PET/CT on 9/20/2019 which showed hypermetabolic right internal iliac node measuring 0.8 x 0.6 cm as well as a  hypermetabolic right common iliac node measuring 0.5 x 0.4 cm suspicious for metastatic disease. The patient was referred to Dr. Quezada at [facility] on 9/25/2019 who recommended salvage radiotherapy along with long-term ADT.    The patient met with Dr. Ronquillo for second opinion on 9/3/219 at which time different management options were discussed including salvage radiotherapy, hormonal treatment and pelvic lymph node dissection. He was seen by Dr. Hoang at Medical Oncology on 10/9/19 who referred the patient to us for discussion of salvage radiotherapy.    On interview today, he was doing well.  He denied any urinary symptoms including frequency, urgency, nocturia, hematuria, dysuria, bowel symptoms, fever, chills, weight or appetite changes. His KPS is 90%. His AUA score is  2/35 and LILY score is 1/25.      HISTORY OF PRIOR RT: None    HISTORY OF PRIOR SYSTEMIC THERAPY: None     PMH:   Past Medical History:   Diagnosis Date     Prostate cancer (H)      PSH:  Past Surgical History:   Procedure Laterality Date     PROSTATE SURGERY       MEDICATIONS:  Current Outpatient Medications   Medication Sig Dispense Refill     fexofenadine (ALLEGRA) 60 MG tablet Take 60 mg by mouth       fluticasone (FLONASE) 50 MCG/ACT nasal spray 2 sprays       hydrocortisone 2.5 % cream Apply to inflamed skin 2-3 times daily until clear but not more than 2-3 weeks at a time       metFORMIN (GLUCOPHAGE) 500 MG tablet Take 500 mg by mouth       Multiple Vitamins-Minerals (CENTRUM SILVER PO)        simvastatin (ZOCOR) 10 MG tablet TAKE 1 TABLET BY MOUTH DAILY.       tadalafil (CIALIS) 5 MG tablet TAKE 4 TABLETS BY MOUTH TWO TIMES PER WEEK AS NEEDED  9     ALLERGY:  Allergies   Allergen Reactions     Amoxicillin-Pot Clavulanate      Itching      Erythromycin      Sildenafil      Visual changes with sildenafil.      FAMILY HISTORY:  Family History   Problem Relation Age of Onset     Cancer Father      Prostate Cancer Father       Colon Cancer Father      SOCIAL HISTORY:  Social History     Tobacco Use     Smoking status: Never Smoker     Smokeless tobacco: Never Used   Substance Use Topics     Alcohol use: Yes     ROS: 10 point ROS reviewed as reported on the nursing assessment note.    PHYSICAL EXAMINATION:    Gen: Alert, in NAD    IMPRESSION: A 63 year old recurrent prostate cancer s/p RALP and BLND in 5/2017. Postop PSA 0.2. Axumin PET CT showed FDG avid right common and internal iliac nodes.      RECOMMENDATION: We discussed with the patient and his wife the Axumin PET/CT results. It is uncertain that the 2 FDG avid lymph nodes represent a true metastatic disease and they may represent false positive result.     We discussed at length the possible management options of his disease. The first option is surgical removal for confirmation these lymph nodes.  If the pathology shows isolated pelvic LN metastasis, we would consider a course of salvage radiotherapy directed to the pelvic nodes and prostate bed.    If the pathology is negative for carcinoma, we would consider a course of salvage radiotherapy targeting the prostate bed alone. If surgery is not possible, a PSMA PET scan for further characterization given its higher sensitivity in lower PSA values.     The third option is salvage radiotherapy along with indefinite hormonal treatment for optimal local control and survival outcomes.The treatment will be delivered over 7 weeks targeting the prostate bed/ and pelvic lymph nodes.     We discussed the rationale, logistics, side effects associated with radiotherapy including fatigue, dysuria, urinary frequency, rectal urgency, diarrhea, radiation cystitis, urethral stricture, radiation proctitis, bowel obstruction, fistula, and secondary malignancies and hormonal related toxicities including impotence, decreased libido, fatigue, weight gain, hot flashes, cognitive changes, dementia,      depression, osteoporosis, and potentially  cardiovascular disease. We discussed possible risk of ADT induced dementia.     At end of discussion, the patient will take some time to think about these options. He will inform us about his decision later. We spent 40 min with the patient, >50% devoted to counseling. The patient and his wife have many questions during our conversation that were answered to their satisfaction and verbalized understanding.     The patient was seen and discussed with staff, Dr. Schreiber. Thank you for involving us in the care of this patient.  Please feel free to contact us with questions or concerns at any time.    Jose Briseno MD  PGY-3 Resident, Radiation Oncology  [facility]    I saw the patient with the resident.  I agree with the resident's note and plan of care.      ABRAHAM Schreiber M.D.  Department of Radiation Oncology  [facility]

## 2019-10-18 ENCOUNTER — TELEPHONE (OUTPATIENT)
Dept: UROLOGY | Facility: CLINIC | Age: 64
End: 2019-10-18

## 2019-10-18 NOTE — TELEPHONE ENCOUNTER
[facility] Call Center    Phone Message    May a detailed message be left on voicemail: yes    Reason for Call: Other: pt needs to know the next step regarding discussing or scheduling the surgery that Dr. Ronquillo recommended. Please call pt back to answer this question for him. Thank you.     Action Taken: Message routed to:  [facility] ([facility]  Urology

## 2019-10-18 NOTE — TELEPHONE ENCOUNTER
Appointment as soon as possible  He dose not want to wait till jan.  Spoke to cho and does not want that treatment  Appointment made Alee Ríos LPN Staff Nurse

## 2019-10-21 LAB — COPATH REPORT: NORMAL

## 2019-10-21 PROCEDURE — 00000346 ZZHCL STATISTIC REVIEW OUTSIDE SLIDES TC 88321: Performed by: RADIOLOGY

## 2019-10-22 ENCOUNTER — PRE VISIT (OUTPATIENT)
Dept: UROLOGY | Facility: CLINIC | Age: 64
End: 2019-10-22

## 2019-10-22 NOTE — TELEPHONE ENCOUNTER
Chief Complaint : surgery discussing    Records/Orders: NA    Pt Contacted: no    At Rooming: normal

## 2019-10-28 ENCOUNTER — HEALTH MAINTENANCE LETTER (OUTPATIENT)
Age: 64
End: 2019-10-28

## 2019-11-06 NOTE — PROGRESS NOTES
Urology Clinic    Ousmane Ronquillo MD  Date of Service: 2019     Name: Zander Stallworth  MRN: [id number]  Age: 63 year old  : 1955  Referring provider: Referred Self     Assessment and Plan:      Assessment:  Zander Stallworth  is a 63 year old male with history of prostate cancer status/post radical prostatectomy with bilateral pelvic lymph node dissection now presenting with PSA and recent PET scan concerning for locally advanced disease involving two pelvic lymph nodes. He has decided to schedule a bilateral lymph node dissection to further characterize his specific disease and hopefully lead to a personalized treatment regimen, ideally postponing hormonal therapy as long as possible.    Plan:  -Schedule patient for a bilateral pelvic lymph node dissection  -Discussed this will not likely affect survival, but will likely drop his PSA and could extend the time that he doesn't need hormone therapy.    Attestation:  This patient was seen and evaluated by me, with a med student taking notes.  I have reviewed the note above and agree.  The physical exam and or any procedures were performed by me and the pertinant details are outlined below.       Ousmane Ronquillo MD  Department of Urology  [facility]    ______________________________________________________________________    HPI  Zander Stallworth is a 63 year old male with history of prostate cancer s/p radical prostatectomy with bilateral pelvic lymph node dissection on 2017 now with rising PSA and PET scan (2019) suspicious for metastatic prostate cancer involving two pelvic lymph nodes. He last presented to me on 10/03/2019 where he came for a second opinion regarding radiation + hormone therapy. At that time, we discussed his options including referral to medical oncologist for medication clinical trial, referral to radiation oncology (or resumption of care with previous radiation oncologist) for radiation and hormone therapy, and  pelvic lymph node dissection surgery. Today, he presents to discuss     After surgery:  Initial PSA: 2.7.  Pathologic Beltrami Score was 3 + 4.  Grade Group:2.  Biopsy Beltrami Score was 5 + 3.  Pathologic Stage [id number].   Positive Margins: No   Number of Lymph Nodes Removed: 3.  Number of Positive Lymph Nodes: 0.  Patient is status post robotic radical prostatectomy on 08/07/2017.      PSA:   09/30/2019 0.2   08/29/2019 0.2   05/20/2019 0.1   02/14/2019 0.1   08/14/2018 <0.1   02/12/2018 <0.1   11/14/2017 <0.1   05/12/2017 2.7   06/10/2015 1.99   09/26/2014 1.65   04/23/2012 1.46   02/11/2011 1.52   11/17/2009 1.26   01/18/2008 1.25   07/02/2003 1.00       Review of Systems:   Pertinent items are noted in HPI or as below, remainder of complete ROS is negative.      Physical Exam:   Patient is a 63 year old  male   Vitals: There were no vitals taken for this visit.  General Appearance Adult: Alert, no acute distress, oriented  HENT: throat/mouth:normal, good dentition  Lungs: no respiratory distress, or pursed lip breathing  Heart: No obvious jugular venous distension present  Abdomen: There is no height or weight on file to calculate BMI.  Musculoskeltal: extremities normal  Skin: no visible suspicious lesions or rashes  Neuro: Alert, oriented, speech and mentation normal  Psych: affect and mood normal  Gait: Normal  : deferred    Imaging:   I personally reviewed all applicable imaging and went over the below findings with patient.    PET scan 09/20/2019:     INDICATION: biochemical recurrence with rising PSA after prostatectomy 2017  COMPARISON: CT and whole-body bone scan from 06/21/2017 are reviewed.  TECHNIQUE: 3 to 5 minutes post intravenous administration of 12.1 mCi F-18 fluciclovine, PET imaging was performed from the skull base to mid thighs utilizing attenuation correction with concurrent axial CT and PET/CT image fusion. Dose reduction techniques were used.    FINDINGS: Fluciclovine-avid lymph nodes  measuring 0.8 x 0.6 cm in the right internal iliac chain along the right pelvic sidewall and 0.5 x 0.4 cm in the right common iliac chain just anterior to the L5 vertebral body. Injection artifact right arm. Fluciclovine activity in the remainder of the body is normal.    Prostatectomy. Mild calcified atherosclerosis, including mid left anterior descending coronary disease. Pelvic phleboliths. Mild degenerative change in the spine.    CONCLUSION:  Findings suspicious for metastatic prostate cancer involving two pelvic lymph nodes  Per radiology.     Scribe Disclosure:    Cookie ARCINIEGA, a scribe, prepared the chart for today's encounter.     Written by Josias Carter, MS3, acting as a scribe for Dr. Garcia

## 2019-11-07 ENCOUNTER — ALLIED HEALTH/NURSE VISIT (OUTPATIENT)
Dept: UROLOGY | Facility: CLINIC | Age: 64
End: 2019-11-07
Payer: MEDICARE

## 2019-11-07 ENCOUNTER — OFFICE VISIT (OUTPATIENT)
Dept: UROLOGY | Facility: CLINIC | Age: 64
End: 2019-11-07
Payer: MEDICARE

## 2019-11-07 ENCOUNTER — PRE VISIT (OUTPATIENT)
Dept: SURGERY | Facility: CLINIC | Age: 64
End: 2019-11-07

## 2019-11-07 VITALS
HEIGHT: 70 IN | DIASTOLIC BLOOD PRESSURE: 66 MMHG | HEART RATE: 61 BPM | BODY MASS INDEX: 24.48 KG/M2 | WEIGHT: 171 LBS | SYSTOLIC BLOOD PRESSURE: 113 MMHG

## 2019-11-07 DIAGNOSIS — C61 PROSTATE CANCER (H): Primary | ICD-10-CM

## 2019-11-07 RX ORDER — CEFAZOLIN SODIUM 1 G/50ML
1 INJECTION, SOLUTION INTRAVENOUS SEE ADMIN INSTRUCTIONS
Status: CANCELLED | OUTPATIENT
Start: 2019-11-07

## 2019-11-07 RX ORDER — CEFAZOLIN SODIUM 2 G/50ML
2 SOLUTION INTRAVENOUS
Status: CANCELLED | OUTPATIENT
Start: 2019-11-07

## 2019-11-07 ASSESSMENT — MIFFLIN-ST. JEOR: SCORE: 1566.9

## 2019-11-07 ASSESSMENT — PAIN SCALES - GENERAL: PAINLEVEL: NO PAIN (0)

## 2019-11-07 NOTE — LETTER
2019       RE: Zander Stallworth  [address]  [facility] [address]     Dear Colleague,    Thank you for referring your patient, Zander Stallworth, to the [facility] UROLOGY AND INST FOR PROSTATE AND UROLOGIC CANCERS at [facility]. Please see a copy of my visit note below.      Urology Clinic    Ousmane Ronquillo MD  Date of Service: 2019     Name: Zander Stallworth  MRN: [id number]  Age: 63 year old  : 1955  Referring provider: Referred Self     Assessment and Plan:      Assessment:  Zander Stallworth  is a 63 year old male with history of prostate cancer status/post radical prostatectomy with bilateral pelvic lymph node dissection now presenting with PSA and recent PET scan concerning for locally advanced disease involving two pelvic lymph nodes. He has decided to schedule a bilateral lymph node dissection to further characterize his specific disease and hopefully lead to a personalized treatment regimen, ideally postponing hormonal therapy as long as possible.    Plan:  -Schedule patient for a bilateral pelvic lymph node dissection  -Discussed this will not likely affect survival, but will likely drop his PSA and could extend the time that he doesn't need hormone therapy.    Attestation:  This patient was seen and evaluated by me, with a med student taking notes.  I have reviewed the note above and agree.  The physical exam and or any procedures were performed by me and the pertinant details are outlined below.       Ousmane Ronquillo MD  Department of Urology  [facility]    ______________________________________________________________________    HPI  Zander Stallworth is a 63 year old male with history of prostate cancer s/p radical prostatectomy with bilateral pelvic lymph node dissection on 2017 now with rising PSA and PET scan (2019) suspicious for metastatic prostate cancer involving two pelvic lymph nodes. He last presented to me on 10/03/2019 where  he came for a second opinion regarding radiation + hormone therapy. At that time, we discussed his options including referral to medical oncologist for medication clinical trial, referral to radiation oncology (or resumption of care with previous radiation oncologist) for radiation and hormone therapy, and pelvic lymph node dissection surgery. Today, he presents to discuss     After surgery:  Initial PSA: 2.7.  Pathologic Cascade Score was 3 + 4.  Grade Group:2.  Biopsy Cascade Score was 5 + 3.  Pathologic Stage [id number].   Positive Margins: No   Number of Lymph Nodes Removed: 3.  Number of Positive Lymph Nodes: 0.  Patient is status post robotic radical prostatectomy on 08/07/2017.      PSA:   09/30/2019 0.2   08/29/2019 0.2   05/20/2019 0.1   02/14/2019 0.1   08/14/2018 <0.1   02/12/2018 <0.1   11/14/2017 <0.1   05/12/2017 2.7   06/10/2015 1.99   09/26/2014 1.65   04/23/2012 1.46   02/11/2011 1.52   11/17/2009 1.26   01/18/2008 1.25   07/02/2003 1.00       Review of Systems:   Pertinent items are noted in HPI or as below, remainder of complete ROS is negative.      Physical Exam:   Patient is a 63 year old  male   Vitals: There were no vitals taken for this visit.  General Appearance Adult: Alert, no acute distress, oriented  HENT: throat/mouth:normal, good dentition  Lungs: no respiratory distress, or pursed lip breathing  Heart: No obvious jugular venous distension present  Abdomen: There is no height or weight on file to calculate BMI.  Musculoskeltal: extremities normal  Skin: no visible suspicious lesions or rashes  Neuro: Alert, oriented, speech and mentation normal  Psych: affect and mood normal  Gait: Normal  : deferred    Imaging:   I personally reviewed all applicable imaging and went over the below findings with patient.    PET scan 09/20/2019:     INDICATION: biochemical recurrence with rising PSA after prostatectomy 2017  COMPARISON: CT and whole-body bone scan from 06/21/2017 are  reviewed.  TECHNIQUE: 3 to 5 minutes post intravenous administration of 12.1 mCi F-18 fluciclovine, PET imaging was performed from the skull base to mid thighs utilizing attenuation correction with concurrent axial CT and PET/CT image fusion. Dose reduction techniques were used.    FINDINGS: Fluciclovine-avid lymph nodes measuring 0.8 x 0.6 cm in the right internal iliac chain along the right pelvic sidewall and 0.5 x 0.4 cm in the right common iliac chain just anterior to the L5 vertebral body. Injection artifact right arm. Fluciclovine activity in the remainder of the body is normal.    Prostatectomy. Mild calcified atherosclerosis, including mid left anterior descending coronary disease. Pelvic phleboliths. Mild degenerative change in the spine.    CONCLUSION:  Findings suspicious for metastatic prostate cancer involving two pelvic lymph nodes  Per radiology.     Scribe Disclosure:    I, Cookie Muhammad, a scribe, prepared the chart for today's encounter.     Written by Josias Carter, MS3, acting as a scribe for Dr. Ronquillo.        Again, thank you for allowing me to participate in the care of your patient.      Sincerely,    Ousmane Ronquillo MD

## 2019-11-07 NOTE — TELEPHONE ENCOUNTER
FUTURE VISIT INFORMATION      SURGERY INFORMATION:    Date: 19    Location: [facility] OR    Surgeon:  Ousmane Ronquillo    Anesthesia Type:  General    RECORDS REQUESTED FROM:       Primary Care Provider: Leland Solis- [employer]    Most recent EKG+ Tracin17- [employer]- requested tracing

## 2019-11-07 NOTE — PROGRESS NOTES
Pre Op Teaching Flowsheet       Pre and Post op Patient Education  Relevant Diagnosis:  Prostate Cancer  Teaching Topic:  Pre and post op teaching for Pelvic Lymph Node Dissection  Person Involved in teaching:  Zander Stallworth      Motivation Level:  Asks Questions: Yes  Eager to Learn:  Yes  Cooperative: Yes  Receptive (willing/able to accept information):  Yes  Patient demonstrates understanding of the following:  Date and time of surgery:  11/19/19  Location of surgery: [address] [place] [address]  History and Physical and any other testing necessary prior to surgery: Yes  Required time line for completion of History and Physical and any pre-op testing: Yes    NPO Guidelines: NPO per Anesthesia Guidelines    Patient demonstrates understanding of the following:  Patient understands the need for a responsible adult to drive them home and someone to stay with them for the first 24 hours post-operatively: Staying 1 day inpatient  Pre-op bowel prep: No, not needed  Pre-op showering/scrub information with Hibiclens Soap: Yes  Medications to take the day of surgery:  Per PCP  Blood thinner medications discussed and when to stop (if applicable):  Yes  Diabetes medication management (if applicable):  N/A  Discussed pain control after surgery: pain scale, pain medications and pain management techniques  Infection Prevention: Patient demonstrates understanding of the following:  Patient instructed on hand hygiene:  Yes  Surgical procedure site care taught: Yes  Signs and symptoms of infection taught:  Yes  Wound care will be taught at the time of discharge.  Central venous catheter care will be taught at the time of discharge (if applicable).    Post-op follow-up:  Discussed how to contact the hospital, nurse, and clinic scheduling staff if necessary.    Instructional materials used/given/mailed:  [place] [facility] Booklet, post op teaching sheet, Map, Soap, and arrival/location information.    Surgical instructions given to patient  in clinic: Yes.    Instructional Materials given:  Before your surgery packet , Medications to avoid before surgery , Showering or Bathing instructions before surgery  and What to expect after surgery    Post-op appointment/testing scheduled per MD orders: Yes    Total time with patient: 10 minutes    Rhiannon Porter RN, BSN  Urology Care Coordinator

## 2019-11-07 NOTE — LETTER
Date:November 13, 2019      Patient was self referred, no letter generated. Do not send.        [facility] Health Information

## 2019-11-07 NOTE — NURSING NOTE
Chief Complaint   Patient presents with     RECHECK     surgery discussing       Fernanda Panda MA

## 2019-11-11 ENCOUNTER — ANESTHESIA EVENT (OUTPATIENT)
Dept: SURGERY | Facility: CLINIC | Age: 64
End: 2019-11-11
Payer: MEDICARE

## 2019-11-11 ENCOUNTER — OFFICE VISIT (OUTPATIENT)
Dept: SURGERY | Facility: CLINIC | Age: 64
End: 2019-11-11
Payer: MEDICARE

## 2019-11-11 VITALS
SYSTOLIC BLOOD PRESSURE: 106 MMHG | HEART RATE: 58 BPM | TEMPERATURE: 97.5 F | HEIGHT: 70 IN | DIASTOLIC BLOOD PRESSURE: 58 MMHG | WEIGHT: 179 LBS | BODY MASS INDEX: 25.62 KG/M2 | OXYGEN SATURATION: 98 % | RESPIRATION RATE: 16 BRPM

## 2019-11-11 DIAGNOSIS — C61 PROSTATE CANCER (H): ICD-10-CM

## 2019-11-11 DIAGNOSIS — Z01.818 PRE-OP EXAMINATION: Primary | ICD-10-CM

## 2019-11-11 LAB
ALBUMIN UR-MCNC: NEGATIVE MG/DL
ANION GAP SERPL CALCULATED.3IONS-SCNC: 4 MMOL/L (ref 3–14)
APPEARANCE UR: CLEAR
BILIRUB UR QL STRIP: NEGATIVE
BUN SERPL-MCNC: 16 MG/DL (ref 7–30)
CALCIUM SERPL-MCNC: 8.9 MG/DL (ref 8.5–10.1)
CHLORIDE SERPL-SCNC: 104 MMOL/L (ref 94–109)
CO2 SERPL-SCNC: 30 MMOL/L (ref 20–32)
COLOR UR AUTO: YELLOW
CREAT SERPL-MCNC: 0.8 MG/DL (ref 0.66–1.25)
ERYTHROCYTE [DISTWIDTH] IN BLOOD BY AUTOMATED COUNT: 12.5 % (ref 10–15)
GFR SERPL CREATININE-BSD FRML MDRD: >90 ML/MIN/{1.73_M2}
GLUCOSE SERPL-MCNC: 100 MG/DL (ref 70–99)
GLUCOSE UR STRIP-MCNC: NEGATIVE MG/DL
HCT VFR BLD AUTO: 43.2 % (ref 40–53)
HGB BLD-MCNC: 14.2 G/DL (ref 13.3–17.7)
HGB UR QL STRIP: NEGATIVE
KETONES UR STRIP-MCNC: NEGATIVE MG/DL
LEUKOCYTE ESTERASE UR QL STRIP: NEGATIVE
MCH RBC QN AUTO: 29.6 PG (ref 26.5–33)
MCHC RBC AUTO-ENTMCNC: 32.9 G/DL (ref 31.5–36.5)
MCV RBC AUTO: 90 FL (ref 78–100)
NITRATE UR QL: NEGATIVE
PH UR STRIP: 6 PH (ref 5–7)
PLATELET # BLD AUTO: 164 10E9/L (ref 150–450)
POTASSIUM SERPL-SCNC: 4.4 MMOL/L (ref 3.4–5.3)
RBC # BLD AUTO: 4.79 10E12/L (ref 4.4–5.9)
SODIUM SERPL-SCNC: 139 MMOL/L (ref 133–144)
SOURCE: NORMAL
SP GR UR STRIP: 1.01 (ref 1–1.03)
UROBILINOGEN UR STRIP-MCNC: 0 MG/DL (ref 0–2)
WBC # BLD AUTO: 5.8 10E9/L (ref 4–11)

## 2019-11-11 ASSESSMENT — MIFFLIN-ST. JEOR: SCORE: 1603.19

## 2019-11-11 ASSESSMENT — ENCOUNTER SYMPTOMS: SEIZURES: 0

## 2019-11-11 NOTE — PATIENT INSTRUCTIONS
Preparing for Your Surgery      Name:  Zander Stallworth   MRN:  [id number]   :  1955   Today's Date:  2019     Arriving for surgery:  Surgery date:  2019  Arrival time:  11:00 am  Please come to:    [facility] [address]  [address] [place]  [address]    - ? parking is available in front of the hospital      -    Please proceed to [address] on the 3rd floor. [phone]?     - ?If you are in need of directions, wheelchair or escort please stop at the Information Desk in the lobby.  Inform the information person that you are here for surgery; a wheelchair and escort to [address] will be provided.?     What can I eat or drink?  -  You may have solid food or milk products until 8 hours prior to your surgery.(5 am)  -  You may have water, apple juice or 7up/Sprite until 2 hours prior to your surgery.(until 11 am arrival time)    Which medicines can I take?        Stop Aspirin, vitamins and supplements one week prior to surgery.        Hold Ibuprofen for 24 hours and/or Naproxen for 48 hours prior to surgery.         Hold Cialis (tadalafil) for 3 days before surgery     -  Do NOT take these medications in the morning, the day of surgery:   Metformin     -  Please take these medications the day of surgery:  Fexofenadine (Allegra)  Fluticasone (flonase)    How do I prepare myself?  -  Take two showers: one the night before surgery; and one the morning of surgery.         Use Scrubcare or Hibiclens to wash from neck down, leave soap on your skin for up to one minute.       Do not get soap in your eyes or ears.  You may use your own shampoo and conditioner; no other hair products.   -  Do NOT use lotion, powder, deodorant, or antiperspirant the day of your surgery.  -  Do NOT wear any makeup, fingernail polish or jewelry.  - Do not bring your own medications to the hospital, except for inhalers and eye   drops.  -  Bring your ID and insurance card.        Questions  or Concerns:  -The Pre Admission Nursing Office at [phone].       -For questions after surgery please call your surgeons office.           AFTER YOUR SURGERY  Breathing exercises   Breathing exercises help you recover faster. Take deep breaths and let the air out slowly. This will:     Help you wake up after surgery.    Help prevent complications like pneumonia.  Preventing complications will help you go home sooner.   We may give you a breathing device (incentive spirometer) to encourage you to breathe deeply.   Nausea and vomiting   You may feel sick to your stomach after surgery; if so, let your nurse know.    Pain control:  After surgery, you may have pain. Our goal is to help you manage your pain. Pain medicine will help you feel comfortable enough to do activities that will help you heal.  These activities may include breathing exercises, walking and physical therapy.   To help your health care team treat your pain we will ask: 1) If you have pain  2) where it is located 3) describe your pain in your words  Methods of pain control include medications given by mouth, vein or by nerve block for some surgeries.  Sequential Compression Device (SCD) or Pneumo Boots:  You may need to wear SCD S on your legs or feet. These are wraps connected to a machine that pumps in air and releases it. The repeated pumping helps prevent blood clots from forming.

## 2019-11-11 NOTE — ANESTHESIA PREPROCEDURE EVALUATION
Anesthesia Pre-Procedure Evaluation    Patient: Zander Stallworth   MRN:     [id number] Gender:   male   Age:    63 year old :      1955        Preoperative Diagnosis: Prostate cancer (H) [C61]   Procedure(s):  davinci assisted bilateral pelvic lymph node dissection     Past Medical History:   Diagnosis Date     DM (diabetes mellitus) (H)      Hearing loss      HLD (hyperlipidemia)      Prostate cancer (H)       Past Surgical History:   Procedure Laterality Date     parotid gland removed       PROSTATE SURGERY  2017          Anesthesia Evaluation     . Pt has had prior anesthetic. Type: General    No history of anesthetic complications    Patient reports he was groggy for months after his 2015 parotidectomy       ROS/MED HX    ENT/Pulmonary:     (+)allergic rhinitis, , . .    Neurologic:  - neg neurologic ROS    (-) seizures, CVA and TIA   Cardiovascular:     (+) Dyslipidemia, ----. : . . . :. . Previous cardiac testing date:results:date: results:ECG reviewed date:19 results:Unusual P axis, possible ectopic atrial bradycardia, low voltage QRS, non specific T wave abnormality. date: results:          METS/Exercise Tolerance:  >4 METS   Hematologic:  - neg hematologic  ROS      (-) history of blood clots, anemia and History of Transfusion   Musculoskeletal:  - neg musculoskeletal ROS       GI/Hepatic:     (+) Other GI/Hepatic hemorrhoids      (-) GERD   Renal/Genitourinary:     (+) Other Renal/ Genitourinary, ED      Endo:     (+) type II DM Last HgA1c: 5.5 date: 19 Not using insulin - not using insulin pump .      Psychiatric:  - neg psychiatric ROS       Infectious Disease:  - neg infectious disease ROS       Malignancy:   (+) Malignancy History of Prostate  Prostate CA Active status post Surgery,         Other:    (+) no H/O Chronic Pain,no other significant disability                        PHYSICAL EXAM:   Mental Status/Neuro: A/A/O; Age Appropriate   Airway: Facies: Feasible  Mallampati:  I  Mouth/Opening: Full  TM distance: > 6 cm  Neck ROM: Full   Respiratory: Auscultation: CTAB     Resp. Rate: Normal     Resp. Effort: Normal      CV: Rhythm: Regular  Rate: Babar  Heart: Normal Sounds  Edema: None  Pulses: Normal   Comments:                      Results for RONALDO SAVAGE (MRN [id number]) as of 11/11/2019 13:11   Ref. Range 11/11/2019 12:28 11/11/2019 12:40   Sodium Latest Ref Range: 133 - 144 mmol/L 139    Potassium Latest Ref Range: 3.4 - 5.3 mmol/L 4.4    Chloride Latest Ref Range: 94 - 109 mmol/L 104    Carbon Dioxide Latest Ref Range: 20 - 32 mmol/L 30    Urea Nitrogen Latest Ref Range: 7 - 30 mg/dL 16    Creatinine Latest Ref Range: 0.66 - 1.25 mg/dL 0.80    GFR Estimate Latest Ref Range: >60 mL/min/1.73_m2 >90    GFR Estimate If Black Latest Ref Range: >60 mL/min/1.73_m2 >90    Calcium Latest Ref Range: 8.5 - 10.1 mg/dL 8.9    Anion Gap Latest Ref Range: 3 - 14 mmol/L 4    Glucose Latest Ref Range: 70 - 99 mg/dL 100 (H)    WBC Latest Ref Range: 4.0 - 11.0 10e9/L 5.8    Hemoglobin Latest Ref Range: 13.3 - 17.7 g/dL 14.2    Hematocrit Latest Ref Range: 40.0 - 53.0 % 43.2    Platelet Count Latest Ref Range: 150 - 450 10e9/L 164    RBC Count Latest Ref Range: 4.4 - 5.9 10e12/L 4.79    MCV Latest Ref Range: 78 - 100 fl 90    MCH Latest Ref Range: 26.5 - 33.0 pg 29.6    MCHC Latest Ref Range: 31.5 - 36.5 g/dL 32.9    RDW Latest Ref Range: 10.0 - 15.0 % 12.5    Color Urine Unknown  Yellow   Appearance Urine Unknown  Clear   Glucose Urine Latest Ref Range: NEG^Negative mg/dL  Negative   Bilirubin Urine Latest Ref Range: NEG^Negative   Negative   Ketones Urine Latest Ref Range: NEG^Negative mg/dL  Negative   Specific Gravity Urine Latest Ref Range: 1.003 - 1.035   1.012   pH Urine Latest Ref Range: 5.0 - 7.0 pH  6.0   Protein Albumin Urine Latest Ref Range: NEG^Negative mg/dL  Negative   Urobilinogen mg/dL Latest Ref Range: 0.0 - 2.0 mg/dL  0.0   Nitrite Urine Latest Ref Range: NEG^Negative   Negative  "  Blood Urine Latest Ref Range: NEG^Negative   Negative   Leukocyte Esterase Urine Latest Ref Range: NEG^Negative   Negative   Source Unknown  Midstream Urine     Preop Vitals    BP Readings from Last 3 Encounters:   11/11/19 106/58   11/07/19 113/66   10/16/19 110/68    Pulse Readings from Last 3 Encounters:   11/11/19 58   11/07/19 61   10/16/19 59      Resp Readings from Last 3 Encounters:   11/11/19 16   10/09/19 14    SpO2 Readings from Last 3 Encounters:   11/11/19 98%   10/09/19 98%      Temp Readings from Last 1 Encounters:   11/11/19 97.5  F (36.4  C) (Oral)    Ht Readings from Last 1 Encounters:   11/11/19 1.778 m (5' 10\")      Wt Readings from Last 1 Encounters:   11/11/19 81.2 kg (179 lb)    Estimated body mass index is 25.68 kg/m  as calculated from the following:    Height as of this encounter: 1.778 m (5' 10\").    Weight as of this encounter: 81.2 kg (179 lb).     LDA:        Assessment:   ASA SCORE: 2    H&P: History and physical reviewed and following examination; no interval change.   Smoking Status:  Non-Smoker/Unknown   NPO Status: NPO Appropriate     Plan:   Anes. Type:  General   Pre-Medication: Midazolam   Induction:  IV (Standard)   Airway: ETT; Oral   Access/Monitoring: PIV; 2nd PIV   Maintenance: Balanced     Postop Plan:   Postop Pain: Opioids  Postop Sedation/Airway: Not planned  Disposition: Inpatient/Admit     PONV Management:   Adult Risk Factors:, Non-Smoker, Postop Opioids   Prevention: Ondansetron, Dexamethasone     CONSENT: Direct conversation   Plan and risks discussed with: Patient   Blood Products: Consented (ALL Blood Products)       Comments for Plan/Consent:  Patient seen and examined by me and available records reviewed. Details as above.    Patient with h/o prostate CA, s/p prostatectomy now with lymph node enlargement concerning for recurrence for lymphadenectomy.  H/o Type 2 Dm on oral agents, exercises regularly (walks up to 5 mi/day). Has tolerated GA well in the " past.     Anesthetic options and risks discussed with patient who agrees to proceed.      Jaki Hernandes MD  11/19/2019                PAC Discussion and Assessment    ASA Classification: 2  Case is suitable for: [place]  Anesthetic techniques and relevant risks discussed: GA  Invasive monitoring and risk discussed:   Types:   Possibility and Risk of blood transfusion discussed:   NPO instructions given:   Additional anesthetic preparation and risks discussed:   Needs early admission to pre-op area:   Other:     PAC Resident/NP Anesthesia Assessment:  Zander is a 63 year old man who is scheduled for  davinci assisted bilateral pelvic lymph node dissection on 11/19/19 by Dr. Ronquillo in treatment of prostate cancer.  PAC referral for risk assessment and optimization for anesthesia with comorbid conditions of HLD, allergic rhinitis, type 2 diabetes, hemorrhoids, history of prostate cancer:    Pre-operative considerations:  1.  Cardiac:  Functional status- METS >4, patient walks 11,000 steps or 5 miles a day.  Intermediate risk surgery with 0.9% (RCRI #) risk of major adverse cardiac event. Patient denies any cardiac symptoms. EKG was done in the office today which showed unusual P axis, possible ectopic atrial bradycardia, low voltage QRS and non specific T wave abnormality. No further cardiac testing indicated.   ~ HLD - continue simvastatin.     2.  Pulm:  Airway feasible.  DEMOND risk: Low risk  ~ Allergic rhinitis - continue allegra and flonase.     3. ENT: The patient is s/p parotidectomy for benign mass. He has hearing loss but does not wear any hearing aids.     4. Endo: type 2 diabetes - hold metformin DOS. A1c was 5.5 on 8/29/19.     5. GI:  Risk of PONV score = 2.  If > 2, anti-emetic intervention recommended.  ~ Hemorrhoids - the patient is usual topical hydrocortisone cream. Continues with some discomfort with bowel movements but reports bleeding has stopped. Is due for colonoscopy in 1/2020. Recommend continued  follow up with PCP.     6. : History of prostate cancer s/p radical prostatectomy and pelvic lymph node dissection 8/2017. Now with increased PSA. Procedure as above.   ~ ED - hold Monday 11/18 dose of Cialis.        VTE risk: 1.8%-3%    Patient is optimized and is acceptable candidate for the proposed procedure.  No further diagnostic evaluation is needed.     Patient discussed with Dr Hernandes.     For further details of assessment, testing, and physical exam please see H and P completed on same date.    Chelsea Coughlin PA-C          Mid-Level Provider/Resident: Chelsea Coughlin PA-C  Date: 11/11/19  Time:     Attending Anesthesiologist Anesthesia Assessment:        Anesthesiologist:   Date:   Time:   Pass/Fail:   Disposition:     PAC Pharmacist Assessment:        Pharmacist:   Date:   Time:    Chelsea Coughlin PA-C

## 2019-11-11 NOTE — H&P
Pre-Operative H & P     CC:  Preoperative exam to assess for increased cardiopulmonary risk while undergoing surgery and anesthesia.    Date of Encounter: 11/11/2019  Primary Care Physician:  No Ref-Primary, Physician     Reason for visit: pre operative examination, prostate cancer    HPI  Zander Stallworth is a 63 year old male who presents for pre-operative H & P in preparation for robotic assisted bilateral pelvic lymph node dissection with Dr. Ronquillo on 11/19/19 at [facility].     The patient is a 63 year old man who has a history of prostate cancer and under went radical prostatectomy with bilateral pelvic lymph node dissection on 8/7/17. He was found to have an increasing PSA and on follow up PET scan on 9/20/19 this showed two pelvic lymph nodes concerning for metastatic disease. He met with Dr. Ronquillo on 10/3/19 and they discussed his treatment options. The patient returned to meet with Dr. Ronquillo on 11/7/19 and is now scheduled for the procedure as above. The patient denies any issues with constipation or diarrhea. He does have some hemorrhoid issues with discomfort with bowel movements and seeing some blood. This has stopped since starting topical steroid cream. He takes Cialis two times a week for ED but otherwise denies any issues with urination.     History is obtained from the patient and chart review    Past Medical History  Past Medical History:   Diagnosis Date     Allergic rhinitis      DM (diabetes mellitus) (H)      Hearing loss      HLD (hyperlipidemia)      Prostate cancer (H)        Past Surgical History  Past Surgical History:   Procedure Laterality Date     parotid gland removed  2015    parotidectomy      PROSTATE SURGERY  08/07/2017    radical prostatectomy with bilateral pelvic lymph node dissection       Hx of Blood transfusions/reactions: denies     Hx of abnormal bleeding or anti-platelet use: none    Menstrual history: No LMP for male patient.:      Steroid use in the last year: none    Personal or FH with difficulty with Anesthesia:  None but patient does reports grogginess for months after 2015 surgery.     Prior to Admission Medications  Current Outpatient Medications   Medication Sig Dispense Refill     fexofenadine (ALLEGRA) 60 MG tablet Take 60 mg by mouth every morning        fluticasone (FLONASE) 50 MCG/ACT nasal spray Spray 2 sprays into both nostrils daily        hydrocortisone 2.5 % cream Apply to inflamed skin 2-3 times daily until clear but not more than 2-3 weeks at a time       metFORMIN (GLUCOPHAGE) 500 MG tablet Take 500 mg by mouth daily (with breakfast)        Multiple Vitamins-Minerals (CENTRUM SILVER PO) Take by mouth every morning        simvastatin (ZOCOR) 10 MG tablet At Bedtime        tadalafil (CIALIS) 5 MG tablet TAKE 4 TABLETS BY MOUTH TWO TIMES PER WEEK AS NEEDED  9       Allergies  Allergies   Allergen Reactions     Amoxicillin-Pot Clavulanate      Itching      Erythromycin      Sildenafil      Visual changes with sildenafil.        Social History  Social History     Socioeconomic History     Marital status:      Spouse name: Not on file     Number of children: Not on file     Years of education: Not on file     Highest education level: Not on file   Occupational History     Not on file   Social Needs     Financial resource strain: Not on file     Food insecurity:     Worry: Not on file     Inability: Not on file     Transportation needs:     Medical: Not on file     Non-medical: Not on file   Tobacco Use     Smoking status: Never Smoker     Smokeless tobacco: Never Used   Substance and Sexual Activity     Alcohol use: Yes     Comment: 10 drinks/week     Drug use: Never     Sexual activity: Yes     Partners: Female   Lifestyle     Physical activity:     Days per week: Not on file     Minutes per session: Not on file     Stress: Not on file   Relationships     Social connections:     Talks on phone: Not on file     Gets  together: Not on file     Attends Restorationism service: Not on file     Active member of club or organization: Not on file     Attends meetings of clubs or organizations: Not on file     Relationship status: Not on file     Intimate partner violence:     Fear of current or ex partner: Not on file     Emotionally abused: Not on file     Physically abused: Not on file     Forced sexual activity: Not on file   Other Topics Concern     Parent/sibling w/ CABG, MI or angioplasty before 65F 55M? No   Social History Narrative     Not on file       Family History  Family History   Problem Relation Age of Onset     Cancer Father      Prostate Cancer Father      Colon Cancer Father      Chronic Obstructive Pulmonary Disease Mother         Smoker       Review of Systems  ROS/MED HX    ENT/Pulmonary:     (+)allergic rhinitis, , . .    Neurologic:  - neg neurologic ROS    (-) seizures, CVA and TIA   Cardiovascular:     (+) Dyslipidemia, ----. : . . . :. . Previous cardiac testing date:results:date: results:ECG reviewed date:11/11/19 results:Unusual P axis, possible ectopic atrial bradycardia, low voltage QRS, non specific T wave abnormality. date: results:          METS/Exercise Tolerance:  >4 METS   Hematologic:  - neg hematologic  ROS      (-) history of blood clots, anemia and History of Transfusion   Musculoskeletal:  - neg musculoskeletal ROS       GI/Hepatic:     (+) Other GI/Hepatic hemorrhoids      (-) GERD   Renal/Genitourinary:     (+) Other Renal/ Genitourinary, ED      Endo:     (+) type II DM Last HgA1c: 5.5 date: 8/29/19 Not using insulin - not using insulin pump .      Psychiatric:  - neg psychiatric ROS       Infectious Disease:  - neg infectious disease ROS       Malignancy:   (+) Malignancy History of Prostate  Prostate CA Active status post Surgery,         Other:    (+) no H/O Chronic Pain,no other significant disability          The complete review of systems is negative other than noted in the HPI or here.  "  Temp: 97.5  F (36.4  C) Temp src: Oral BP: 106/58 Pulse: 58   Resp: 16 SpO2: 98 %         179 lbs 0 oz  5' 10\"   Body mass index is 25.68 kg/m .       Physical Exam  Constitutional: Awake, alert, cooperative, no apparent distress, and appears stated age.  Eyes: Pupils equal, round and reactive to light, extra ocular muscles intact, sclera clear, conjunctiva normal.  HENT: Normocephalic, oral pharynx with moist mucus membranes, good dentition. No goiter appreciated.   Respiratory: Clear to auscultation bilaterally, no crackles or wheezing.  Cardiovascular: Regular rate and rhythm, normal S1 and S2, and no murmur noted.  Carotids +2, no bruits. No edema. Palpable pulses to radial  DP and PT arteries.   GI: Normal bowel sounds, soft, non-distended, non-tender, no masses palpated, no hepatosplenomegaly.  Surgical scars: well healed.   Lymph/Hematologic: No cervical lymphadenopathy and no supraclavicular lymphadenopathy.  Genitourinary:  defer  Skin: Warm and dry.  No rashes at anticipated surgical site.   Musculoskeletal: Full ROM of neck. There is no redness, warmth, or swelling of the joints. Gross motor strength is normal.    Neurologic: Awake, alert, oriented to name, place and time. Cranial nerves II-XII are grossly intact. Gait is normal.   Neuropsychiatric: Calm, cooperative. Normal affect.     Labs: (personally reviewed)  Results for RONALDO SAVAGE (MRN [id number]) as of 11/11/2019 13:11   Ref. Range 11/11/2019 12:28 11/11/2019 12:40   Sodium Latest Ref Range: 133 - 144 mmol/L 139    Potassium Latest Ref Range: 3.4 - 5.3 mmol/L 4.4    Chloride Latest Ref Range: 94 - 109 mmol/L 104    Carbon Dioxide Latest Ref Range: 20 - 32 mmol/L 30    Urea Nitrogen Latest Ref Range: 7 - 30 mg/dL 16    Creatinine Latest Ref Range: 0.66 - 1.25 mg/dL 0.80    GFR Estimate Latest Ref Range: >60 mL/min/1.73_m2 >90    GFR Estimate If Black Latest Ref Range: >60 mL/min/1.73_m2 >90    Calcium Latest Ref Range: 8.5 - 10.1 mg/dL 8.9  "   Anion Gap Latest Ref Range: 3 - 14 mmol/L 4    Glucose Latest Ref Range: 70 - 99 mg/dL 100 (H)    WBC Latest Ref Range: 4.0 - 11.0 10e9/L 5.8    Hemoglobin Latest Ref Range: 13.3 - 17.7 g/dL 14.2    Hematocrit Latest Ref Range: 40.0 - 53.0 % 43.2    Platelet Count Latest Ref Range: 150 - 450 10e9/L 164    RBC Count Latest Ref Range: 4.4 - 5.9 10e12/L 4.79    MCV Latest Ref Range: 78 - 100 fl 90    MCH Latest Ref Range: 26.5 - 33.0 pg 29.6    MCHC Latest Ref Range: 31.5 - 36.5 g/dL 32.9    RDW Latest Ref Range: 10.0 - 15.0 % 12.5    Color Urine Unknown  Yellow   Appearance Urine Unknown  Clear   Glucose Urine Latest Ref Range: NEG^Negative mg/dL  Negative   Bilirubin Urine Latest Ref Range: NEG^Negative   Negative   Ketones Urine Latest Ref Range: NEG^Negative mg/dL  Negative   Specific Gravity Urine Latest Ref Range: 1.003 - 1.035   1.012   pH Urine Latest Ref Range: 5.0 - 7.0 pH  6.0   Protein Albumin Urine Latest Ref Range: NEG^Negative mg/dL  Negative   Urobilinogen mg/dL Latest Ref Range: 0.0 - 2.0 mg/dL  0.0   Nitrite Urine Latest Ref Range: NEG^Negative   Negative   Blood Urine Latest Ref Range: NEG^Negative   Negative   Leukocyte Esterase Urine Latest Ref Range: NEG^Negative   Negative   Source Unknown  Midstream Urine       EKG: Personally reviewed but formal cardiology read pendin19  Unusual P axis, possible ectopic atrial bradycardia, low voltage QRS, nonspecific T wave abnormality.       PET scan 2019:     INDICATION: biochemical recurrence with rising PSA after prostatectomy   COMPARISON: CT and whole-body bone scan from 2017 are reviewed.  TECHNIQUE: 3 to 5 minutes post intravenous administration of 12.1 mCi F-18 fluciclovine, PET imaging was performed from the skull base to mid thighs utilizing attenuation correction with concurrent axial CT and PET/CT image fusion. Dose reduction techniques were used.    FINDINGS: Fluciclovine-avid lymph nodes measuring 0.8 x 0.6 cm in the  right internal iliac chain along the right pelvic sidewall and 0.5 x 0.4 cm in the right common iliac chain just anterior to the L5 vertebral body. Injection artifact right arm. Fluciclovine activity in the remainder of the body is normal.    Prostatectomy. Mild calcified atherosclerosis, including mid left anterior descending coronary disease. Pelvic phleboliths. Mild degenerative change in the spine.    CONCLUSION:  Findings suspicious for metastatic prostate cancer involving two pelvic lymph nodes  Per radiology.     The patient's records and results personally reviewed by this provider.     Outside records reviewed from: care everywhere     ASSESSMENT and PLAN  Zander is a 63 year old man who is scheduled for  davinci assisted bilateral pelvic lymph node dissection on 11/19/19 by Dr. Ronquillo in treatment of prostate cancer.  PAC referral for risk assessment and optimization for anesthesia with comorbid conditions of HLD, allergic rhinitis, type 2 diabetes, hemorrhoids, history of prostate cancer:    Pre-operative considerations:  1.  Cardiac:  Functional status- METS >4, patient walks 11,000 steps or 5 miles a day.  Intermediate risk surgery with 0.9% (RCRI #) risk of major adverse cardiac event. Patient denies any cardiac symptoms. EKG was done in the office today which showed unusual P axis, possible ectopic atrial bradycardia, low voltage QRS and non specific T wave abnormality. No further cardiac testing indicated.   ~ HLD - continue simvastatin.     2.  Pulm:  Airway feasible.  DEMOND risk: Low risk  ~ Allergic rhinitis - continue allegra and flonase.     3. ENT: The patient is s/p parotidectomy for benign mass. He has hearing loss but does not wear any hearing aids.     4. Endo: type 2 diabetes - hold metformin DOS. A1c was 5.5 on 8/29/19.     5. GI:  Risk of PONV score = 2.  If > 2, anti-emetic intervention recommended.  ~ Hemorrhoids - the patient is usual topical hydrocortisone cream. Continues with some  discomfort with bowel movements but reports bleeding has stopped. Is due for colonoscopy in 1/2020. Recommend continued follow up with PCP.     6. : History of prostate cancer s/p radical prostatectomy and pelvic lymph node dissection 8/2017. Now with increased PSA. Procedure as above.   ~ ED - hold Monday 11/18 dose of Cialis.        VTE risk: 1.8%-3%    Patient was discussed with Dr Hernandes.    The patient is optimized for their procedure. AVS with information on surgery time/arrival time, meds and NPO status given by nursing staff.        Chelsea Coughlin PA-C  Preoperative Assessment Center  [facility]  Clinic and Surgery Center  Phone: [phone]  Fax: [phone]

## 2019-11-12 LAB — INTERPRETATION ECG - MUSE: NORMAL

## 2019-11-19 ENCOUNTER — HOSPITAL ENCOUNTER (OUTPATIENT)
Facility: CLINIC | Age: 64
Discharge: HOME | End: 2019-11-19
Attending: UROLOGY | Admitting: UROLOGY
Payer: MEDICARE

## 2019-11-19 ENCOUNTER — ANESTHESIA (OUTPATIENT)
Dept: SURGERY | Facility: CLINIC | Age: 64
End: 2019-11-19
Payer: MEDICARE

## 2019-11-19 VITALS
HEIGHT: 70 IN | SYSTOLIC BLOOD PRESSURE: 146 MMHG | TEMPERATURE: 98 F | WEIGHT: 177.25 LBS | OXYGEN SATURATION: 100 % | BODY MASS INDEX: 25.38 KG/M2 | HEART RATE: 71 BPM | DIASTOLIC BLOOD PRESSURE: 80 MMHG | RESPIRATION RATE: 16 BRPM

## 2019-11-19 DIAGNOSIS — C61 PROSTATE CANCER (H): ICD-10-CM

## 2019-11-19 LAB
ABO + RH BLD: NORMAL
ABO + RH BLD: NORMAL
BLD GP AB SCN SERPL QL: NORMAL
BLOOD BANK CMNT PATIENT-IMP: NORMAL
BLOOD BANK CMNT PATIENT-IMP: NORMAL
GLUCOSE BLDC GLUCOMTR-MCNC: 112 MG/DL (ref 70–99)
GLUCOSE BLDC GLUCOMTR-MCNC: 137 MG/DL (ref 70–99)
SPECIMEN EXP DATE BLD: NORMAL

## 2019-11-19 PROCEDURE — 25000128 H RX IP 250 OP 636: Performed by: UROLOGY

## 2019-11-19 PROCEDURE — 25000128 H RX IP 250 OP 636: Performed by: NURSE ANESTHETIST, CERTIFIED REGISTERED

## 2019-11-19 PROCEDURE — 37000008 ZZH ANESTHESIA TECHNICAL FEE, 1ST 30 MIN: Performed by: UROLOGY

## 2019-11-19 PROCEDURE — 36000086 ZZH SURGERY LEVEL 8 1ST 30 MIN UMMC: Performed by: UROLOGY

## 2019-11-19 PROCEDURE — 36000088 ZZH SURGERY LEVEL 8 EA 15 ADDTL MIN - UMMC: Performed by: UROLOGY

## 2019-11-19 PROCEDURE — 25800030 ZZH RX IP 258 OP 636: Performed by: NURSE ANESTHETIST, CERTIFIED REGISTERED

## 2019-11-19 PROCEDURE — 27211024 ZZHC OR SUPPLY OTHER OPNP: Performed by: UROLOGY

## 2019-11-19 PROCEDURE — 40000170 ZZH STATISTIC PRE-PROCEDURE ASSESSMENT II: Performed by: UROLOGY

## 2019-11-19 PROCEDURE — 25000128 H RX IP 250 OP 636: Performed by: ANESTHESIOLOGY

## 2019-11-19 PROCEDURE — 37000009 ZZH ANESTHESIA TECHNICAL FEE, EACH ADDTL 15 MIN: Performed by: UROLOGY

## 2019-11-19 PROCEDURE — 25000125 ZZHC RX 250: Performed by: NURSE ANESTHETIST, CERTIFIED REGISTERED

## 2019-11-19 PROCEDURE — 88341 IMHCHEM/IMCYTCHM EA ADD ANTB: CPT | Performed by: UROLOGY

## 2019-11-19 PROCEDURE — 71000027 ZZH RECOVERY PHASE 2 EACH 15 MINS: Performed by: UROLOGY

## 2019-11-19 PROCEDURE — 71000014 ZZH RECOVERY PHASE 1 LEVEL 2 FIRST HR: Performed by: UROLOGY

## 2019-11-19 PROCEDURE — 88342 IMHCHEM/IMCYTCHM 1ST ANTB: CPT | Performed by: UROLOGY

## 2019-11-19 PROCEDURE — 25000566 ZZH SEVOFLURANE, EA 15 MIN: Performed by: UROLOGY

## 2019-11-19 PROCEDURE — 27210794 ZZH OR GENERAL SUPPLY STERILE: Performed by: UROLOGY

## 2019-11-19 PROCEDURE — 88305 TISSUE EXAM BY PATHOLOGIST: CPT | Performed by: UROLOGY

## 2019-11-19 PROCEDURE — 71000015 ZZH RECOVERY PHASE 1 LEVEL 2 EA ADDTL HR: Performed by: UROLOGY

## 2019-11-19 PROCEDURE — 82962 GLUCOSE BLOOD TEST: CPT | Mod: 91

## 2019-11-19 RX ORDER — NALOXONE HYDROCHLORIDE 0.4 MG/ML
.1-.4 INJECTION, SOLUTION INTRAMUSCULAR; INTRAVENOUS; SUBCUTANEOUS
Status: DISCONTINUED | OUTPATIENT
Start: 2019-11-19 | End: 2019-11-19 | Stop reason: HOSPADM

## 2019-11-19 RX ORDER — ONDANSETRON 2 MG/ML
INJECTION INTRAMUSCULAR; INTRAVENOUS PRN
Status: DISCONTINUED | OUTPATIENT
Start: 2019-11-19 | End: 2019-11-19

## 2019-11-19 RX ORDER — DEXAMETHASONE SODIUM PHOSPHATE 4 MG/ML
INJECTION, SOLUTION INTRA-ARTICULAR; INTRALESIONAL; INTRAMUSCULAR; INTRAVENOUS; SOFT TISSUE PRN
Status: DISCONTINUED | OUTPATIENT
Start: 2019-11-19 | End: 2019-11-19

## 2019-11-19 RX ORDER — ONDANSETRON 4 MG/1
4 TABLET, ORALLY DISINTEGRATING ORAL EVERY 30 MIN PRN
Status: DISCONTINUED | OUTPATIENT
Start: 2019-11-19 | End: 2019-11-19 | Stop reason: HOSPADM

## 2019-11-19 RX ORDER — SODIUM CHLORIDE, SODIUM LACTATE, POTASSIUM CHLORIDE, CALCIUM CHLORIDE 600; 310; 30; 20 MG/100ML; MG/100ML; MG/100ML; MG/100ML
INJECTION, SOLUTION INTRAVENOUS CONTINUOUS PRN
Status: DISCONTINUED | OUTPATIENT
Start: 2019-11-19 | End: 2019-11-19

## 2019-11-19 RX ORDER — HYDROCORTISONE ACETATE 25 MG/1
25 SUPPOSITORY RECTAL 2 TIMES DAILY
COMMUNITY
Start: 2019-10-18

## 2019-11-19 RX ORDER — FENTANYL CITRATE 50 UG/ML
25-50 INJECTION, SOLUTION INTRAMUSCULAR; INTRAVENOUS
Status: DISCONTINUED | OUTPATIENT
Start: 2019-11-19 | End: 2019-11-19 | Stop reason: HOSPADM

## 2019-11-19 RX ORDER — OXYCODONE HYDROCHLORIDE 5 MG/1
5-10 TABLET ORAL EVERY 6 HOURS PRN
Qty: 20 TABLET | Refills: 0 | Status: SHIPPED | OUTPATIENT
Start: 2019-11-19 | End: 2019-11-24

## 2019-11-19 RX ORDER — LIDOCAINE HYDROCHLORIDE 20 MG/ML
INJECTION, SOLUTION INFILTRATION; PERINEURAL PRN
Status: DISCONTINUED | OUTPATIENT
Start: 2019-11-19 | End: 2019-11-19

## 2019-11-19 RX ORDER — OXYCODONE HYDROCHLORIDE 5 MG/1
5-10 TABLET ORAL EVERY 6 HOURS PRN
Qty: 20 TABLET | Refills: 0 | Status: SHIPPED | OUTPATIENT
Start: 2019-11-19 | End: 2019-11-19

## 2019-11-19 RX ORDER — SODIUM CHLORIDE, SODIUM LACTATE, POTASSIUM CHLORIDE, CALCIUM CHLORIDE 600; 310; 30; 20 MG/100ML; MG/100ML; MG/100ML; MG/100ML
INJECTION, SOLUTION INTRAVENOUS CONTINUOUS
Status: DISCONTINUED | OUTPATIENT
Start: 2019-11-19 | End: 2019-11-19 | Stop reason: HOSPADM

## 2019-11-19 RX ORDER — CEFAZOLIN SODIUM 2 G/100ML
2 INJECTION, SOLUTION INTRAVENOUS
Status: COMPLETED | OUTPATIENT
Start: 2019-11-19 | End: 2019-11-19

## 2019-11-19 RX ORDER — SENNA AND DOCUSATE SODIUM 50; 8.6 MG/1; MG/1
1 TABLET, FILM COATED ORAL AT BEDTIME
Qty: 20 TABLET | Refills: 0 | Status: SHIPPED | OUTPATIENT
Start: 2019-11-19 | End: 2020-12-16

## 2019-11-19 RX ORDER — HYDROMORPHONE HYDROCHLORIDE 1 MG/ML
.3-.5 INJECTION, SOLUTION INTRAMUSCULAR; INTRAVENOUS; SUBCUTANEOUS EVERY 5 MIN PRN
Status: DISCONTINUED | OUTPATIENT
Start: 2019-11-19 | End: 2019-11-19 | Stop reason: HOSPADM

## 2019-11-19 RX ORDER — ONDANSETRON 2 MG/ML
4 INJECTION INTRAMUSCULAR; INTRAVENOUS EVERY 30 MIN PRN
Status: DISCONTINUED | OUTPATIENT
Start: 2019-11-19 | End: 2019-11-19 | Stop reason: HOSPADM

## 2019-11-19 RX ORDER — LIDOCAINE 40 MG/G
CREAM TOPICAL
Status: DISCONTINUED | OUTPATIENT
Start: 2019-11-19 | End: 2019-11-19 | Stop reason: HOSPADM

## 2019-11-19 RX ORDER — CEFAZOLIN SODIUM 1 G/3ML
1 INJECTION, POWDER, FOR SOLUTION INTRAMUSCULAR; INTRAVENOUS SEE ADMIN INSTRUCTIONS
Status: DISCONTINUED | OUTPATIENT
Start: 2019-11-19 | End: 2019-11-19 | Stop reason: HOSPADM

## 2019-11-19 RX ORDER — FLUMAZENIL 0.1 MG/ML
0.2 INJECTION, SOLUTION INTRAVENOUS
Status: DISCONTINUED | OUTPATIENT
Start: 2019-11-19 | End: 2019-11-19 | Stop reason: HOSPADM

## 2019-11-19 RX ORDER — FENTANYL CITRATE 50 UG/ML
INJECTION, SOLUTION INTRAMUSCULAR; INTRAVENOUS PRN
Status: DISCONTINUED | OUTPATIENT
Start: 2019-11-19 | End: 2019-11-19

## 2019-11-19 RX ORDER — PROPOFOL 10 MG/ML
INJECTION, EMULSION INTRAVENOUS PRN
Status: DISCONTINUED | OUTPATIENT
Start: 2019-11-19 | End: 2019-11-19

## 2019-11-19 RX ADMIN — MIDAZOLAM 2 MG: 1 INJECTION INTRAMUSCULAR; INTRAVENOUS at 13:14

## 2019-11-19 RX ADMIN — SODIUM CHLORIDE, POTASSIUM CHLORIDE, SODIUM LACTATE AND CALCIUM CHLORIDE: 600; 310; 30; 20 INJECTION, SOLUTION INTRAVENOUS at 15:30

## 2019-11-19 RX ADMIN — ROCURONIUM BROMIDE 50 MG: 10 INJECTION INTRAVENOUS at 13:33

## 2019-11-19 RX ADMIN — HYDROMORPHONE HYDROCHLORIDE 0.5 MG: 1 INJECTION, SOLUTION INTRAMUSCULAR; INTRAVENOUS; SUBCUTANEOUS at 16:53

## 2019-11-19 RX ADMIN — PROPOFOL 100 MG: 10 INJECTION, EMULSION INTRAVENOUS at 13:33

## 2019-11-19 RX ADMIN — LIDOCAINE HYDROCHLORIDE 40 MG: 20 INJECTION, SOLUTION INFILTRATION; PERINEURAL at 13:33

## 2019-11-19 RX ADMIN — ROCURONIUM BROMIDE 20 MG: 10 INJECTION INTRAVENOUS at 14:05

## 2019-11-19 RX ADMIN — ONDANSETRON 4 MG: 2 INJECTION INTRAMUSCULAR; INTRAVENOUS at 16:48

## 2019-11-19 RX ADMIN — FENTANYL CITRATE 50 MCG: 50 INJECTION, SOLUTION INTRAMUSCULAR; INTRAVENOUS at 14:09

## 2019-11-19 RX ADMIN — FENTANYL CITRATE 50 MCG: 50 INJECTION, SOLUTION INTRAMUSCULAR; INTRAVENOUS at 14:01

## 2019-11-19 RX ADMIN — FENTANYL CITRATE 100 MCG: 50 INJECTION, SOLUTION INTRAMUSCULAR; INTRAVENOUS at 13:25

## 2019-11-19 RX ADMIN — DEXAMETHASONE SODIUM PHOSPHATE 6 MG: 4 INJECTION, SOLUTION INTRA-ARTICULAR; INTRALESIONAL; INTRAMUSCULAR; INTRAVENOUS; SOFT TISSUE at 13:33

## 2019-11-19 RX ADMIN — ROCURONIUM BROMIDE 10 MG: 10 INJECTION INTRAVENOUS at 16:30

## 2019-11-19 RX ADMIN — SODIUM CHLORIDE, POTASSIUM CHLORIDE, SODIUM LACTATE AND CALCIUM CHLORIDE: 600; 310; 30; 20 INJECTION, SOLUTION INTRAVENOUS at 13:40

## 2019-11-19 RX ADMIN — CEFAZOLIN SODIUM 2 G: 2 INJECTION, SOLUTION INTRAVENOUS at 13:26

## 2019-11-19 RX ADMIN — FENTANYL CITRATE 25 MCG: 50 INJECTION, SOLUTION INTRAMUSCULAR; INTRAVENOUS at 17:42

## 2019-11-19 RX ADMIN — SUGAMMADEX 200 MG: 100 INJECTION, SOLUTION INTRAVENOUS at 16:54

## 2019-11-19 RX ADMIN — CEFAZOLIN 1 G: 1 INJECTION, POWDER, FOR SOLUTION INTRAMUSCULAR; INTRAVENOUS at 15:31

## 2019-11-19 RX ADMIN — FENTANYL CITRATE 25 MCG: 50 INJECTION, SOLUTION INTRAMUSCULAR; INTRAVENOUS at 17:38

## 2019-11-19 RX ADMIN — SODIUM CHLORIDE, POTASSIUM CHLORIDE, SODIUM LACTATE AND CALCIUM CHLORIDE: 600; 310; 30; 20 INJECTION, SOLUTION INTRAVENOUS at 13:18

## 2019-11-19 RX ADMIN — FENTANYL CITRATE 50 MCG: 50 INJECTION, SOLUTION INTRAMUSCULAR; INTRAVENOUS at 14:21

## 2019-11-19 RX ADMIN — ROCURONIUM BROMIDE 10 MG: 10 INJECTION INTRAVENOUS at 16:00

## 2019-11-19 RX ADMIN — HYDROMORPHONE HYDROCHLORIDE 0.3 MG: 1 INJECTION, SOLUTION INTRAMUSCULAR; INTRAVENOUS; SUBCUTANEOUS at 17:58

## 2019-11-19 RX ADMIN — ROCURONIUM BROMIDE 30 MG: 10 INJECTION INTRAVENOUS at 15:10

## 2019-11-19 ASSESSMENT — PAIN DESCRIPTION - DESCRIPTORS
DESCRIPTORS: ACHING

## 2019-11-19 ASSESSMENT — MIFFLIN-ST. JEOR: SCORE: 1595.25

## 2019-11-19 NOTE — OP NOTE
Preoperative diagnosis: Prostate cancer post radical prostatectomy with pelvic lymph node recurrences    Postoperative diagnosis: Same    Operative procedure performed: Bilateral robot-assisted laparoscopic pelvic lymph node dissection    Operative findings:  1.  Enlarged nodes on the medial side of the left common iliac artery were noticed which were positive on PET/CT preoperatively.  2.  Bilateral standard pelvic node dissection was performed identifying and preserving bilateral obturator nerves.    Estimated blood loss: Less than 50 mL    Operating team:  1.  Ousmane devries MD-primary  2.  Shane Mazariegos MD-fellow assisting  3.  Edinson Kothari MD resident assisting  4.  Holli valdez MD-resident assisting    Procedure details:  Mr. Zander Stallworth is a 63-year-old gentleman who had undergone robot-assisted radical prostatectomy and was on follow-up serial PSA checkups and was found to have increased uptake on maximum PET CT scan and the right-sided pelvic lymph nodes.  After relevant discussions with medical oncology and radiation oncology he agreed to undergo the above-mentioned procedure.    The patient was brought to the OR and general anesthesia started after endotracheal intubation.  Patient was kept supine with arms tucked by the side and adequately strapped and padded.  A tilt test was performed prior to the procedure to confirm that he was adequately secured to the bed.  The operative part was prepared with ChloraPrep and then draped.  A timeout was performed confirming the procedure to be performed.    Pneumoperitoneum was created with Veress needle through the umbilicus.  After adequate distention 4 robotic ports and a 12 mm port for the assistant was inserted as per the standard technique.  The robot was docked and the procedure started.  We started with dissection  on the right side preserving the obturator nerve.  After this we proceeded with dissection of the right common iliac nodes that  were seen on the PET/CT  scan.  The right ureter was identified and preserved.  Adequate hemostasis was observed and we proceeded to lymph node dissection on the left side.  A standard left-sided pelvic lymph node dissection was performed in the left  obturator nerve.  After completion both the sites were checked for hemostasis and irrigated and then suctioned.  After satisfactory hemostasis we concluded the procedure.  All instruments were removed, robot undocked and ports removed.  Port sites were closed with subcuticular 4-0 Monocryl. Skin was closed with skin glue. Patient recovered well from anesthesia and then transferred to PACU in a stable condition.

## 2019-11-19 NOTE — DISCHARGE INSTRUCTIONS
[facility]  Same-Day Surgery   Adult Discharge Orders & Instructions     For 24 hours after surgery    1. Get plenty of rest.  A responsible adult must stay with you for at least 24 hours after you leave the hospital.   2. Do not drive or use heavy equipment.  If you have weakness or tingling, don't drive or use heavy equipment until this feeling goes away.  3. Do not drink alcohol.  4. Avoid strenuous or risky activities.  Ask for help when climbing stairs.   5. You may feel lightheaded.  IF so, sit for a few minutes before standing.  Have someone help you get up.   6. If you have nausea (feel sick to your stomach): Drink only clear liquids such as apple juice, ginger ale, broth or 7-Up.  Rest may also help.  Be sure to drink enough fluids.  Move to a regular diet as you feel able.  7. You may have a slight fever. Call the doctor if your fever is over 100 F (37.7 C) (taken under the tongue) or lasts longer than 24 hours.  8. You may have a dry mouth, a sore throat, muscle aches or trouble sleeping.  These should go away after 24 hours.  9. Do not make important or legal decisions.   Call your doctor for any of the followin.  Signs of infection (fever, growing tenderness at the surgery site, a large amount of drainage or bleeding, severe pain, foul-smelling drainage, redness, swelling).    2. It has been over 8 to 10 hours since surgery and you are still not able to urinate (pass water).    3.  Headache for over 24 hours.    To contact a doctor, call Dr. Ronquillo at the Prostate/Urology Clinic @ [phone] or:        [phone] and ask for the resident on call for   Urology (answered 24 hours a day)      Emergency Department:    [facility]: [phone]       (TTY for hearing impaired: [phone])    [facility]: [phone]       (TTY for hearing impaired: [phone])

## 2019-11-19 NOTE — ANESTHESIA CARE TRANSFER NOTE
Patient: Zander Stallworth    Procedure(s):  davinci assisted bilateral pelvic lymph node dissection    Diagnosis: Prostate cancer (H) [C61]  Diagnosis Additional Information: No value filed.    Anesthesia Type:   No value filed.     Note:  Airway :Face Mask  Patient transferred to:PACU  Handoff Report: Identifed the Patient, Identified the Reponsible Provider, Reviewed the pertinent medical history, Discussed the surgical course, Reviewed Intra-OP anesthesia mangement and issues during anesthesia, Set expectations for post-procedure period and Allowed opportunity for questions and acknowledgement of understanding      Vitals: (Last set prior to Anesthesia Care Transfer)    CRNA VITALS  11/19/2019 1641 - 11/19/2019 1717      11/19/2019             NIBP:  151/71    Pulse:  58    SpO2:  100 %    Resp Rate (observed):  16                Electronically Signed By: MAIKEL Vang CRNA  November 19, 2019  5:17 PM

## 2019-11-19 NOTE — LETTER
December 5, 2019       TO: Ronaldo Stallworth  [address]  [place] [address]       DearMr.Basim,    We are writing to inform you of your test results.    Will discuss results in next clinic visit    Resulted Orders   Surgical pathology exam   Result Value Ref Range    Copath Report       Patient Name: RONALDO STALLWORTH  MR#: [id number]  Specimen #: [id number]  Collected: 11/19/2019  Received: 11/19/2019  Reported: 11/25/2019 19:31  Ordering Phy(s): SHAY HEREDIA    For improved result formatting, select 'View Enhanced Report Format' under   Linked Documents section.    SPECIMEN(S):  A: Lymph nodes, right pelvic  B: Lymph nodes, right iliac  C: Lymph nodes, right common iliac  D: Lymph nodes, right deep obturator  E: Lymph nodes, left pelvic    FINAL DIAGNOSIS:  A. Lymph nodes, right pelvic, excision:  - Seven benign lymph nodes (0/7)    B. Lymph nodes, right iliac, excision:  - Six benign lymph nodes (0/6)    C. Lymph nodes, right common iliac, excision:       - Two of five lymph nodes positive for metastatic prostatic   adenocarcinoma (2/5); please see comment  - Largest metastatic deposit: 1.1 cm  - Extranodal extension is present  -    D. Lymph nodes, right deep obturator, excision:  - Benign soft tissue  - No lymph nodes identified    E. Lymph nodes, left pelvi c, excision:  - Three benign lymph nodes (0/3)    COMMENT:  Immunohistochemical stains show the tumor is positive for synaptophysin.   Chromogranin and CD56 show scattered  reactivity. While not confirmatory, these results suggest high grade   neuroendocrine differentiation.    I have personally reviewed all specimens and/or slides, including the   listed special stains, and used them  with my medical judgement to determine or confirm the final diagnosis.    Electronically signed out by:    Bertha Ha M.D., [facility]    CLINICAL HISTORY:  Prostate cancer (2017).    GROSS:  A: The specimen is received in formalin with proper patient  "  identification, labeled \"right pelvic lymph  nodes\".  The specimen consists of a 4.6 x 4.2 x 1.1 cm aggregate of   tan-yellow soft tissue. A lymph node  search reveals 7 possible lymph nodes ranging from 0.2-1.5 cm in greatest   dimension. All possible lymph nodes  are submitted.    Summary of Sections:  A1 - one possible lymph node  A2 - 3 possible  lymph nodes  A3 - 3 possible lymph nodes    B: The specimen is received in formalin with proper patient   identification, labeled \"right iliac lymph nodes\".   The specimen consists of a 6.1 x 3.9 x 1.5 cm aggregate of tan yellow   adipose tissue. A lymph node search  reveals 6 possible lymph nodes which range from 0.5-2.2 cm in greatest   dimension. All possible lymph nodes are  submitted.    Summary of Sections:  B1 - one possible lymph node, bisected  B2 - one possible lymph node, bisected  B3 - 3 possible lymph nodes  B4 - one possible lymph node    C: The specimen is received in formalin with proper patient   identification, labeled \"right common iliac nodes,  PET positive\".  The specimen consists of a 3.5 x 2.2 x 1.1 cm aggregate of   tan yellow, lobulated adipose  tissue. A lymph node search reveals 5 possible lymph nodes, ranging from   0.5-1.2 cm in greatest dimension. 2  of the lymph nodes are tan-white and firm. Sectioning these nodes reveals   diffusely tan white, gritty cu t  surfaces. All possible lymph nodes are entirely submitted    Summary of Sections:  C1 - one firm possible lymph node, bisected  C2 - one firm possible lymph node, bisected  C3 - 3 possible lymph nodes    D: The specimen is received in formalin with proper patient   identification, labeled \"right deep obturator  lymph nodes\".  The specimen consists of a 2.1 x 1.9 x 1.2 cm aggregate of   tan yellow soft tissue. A lymph node  search does not reveal any lymph node candidates. The specimen is wrapped   and entirely submitted in D 1.    E: The specimen is received in formalin with proper " "patient   identification, labeled \"left pelvic lymph nodes\".   The specimen consists of a 3.3 x 2.5 x 1.0 cm aggregate of tan yellow   soft tissue. A lymph node search  reveals 3 possible lymph nodes which range from 0.7-1.1 cm in greatest   dimension. All possible lymph nodes are  submitted    Summary of Sections:  E1 - one possible lymph node  E2 - 2 possible lymph nodes (Dictated by: CINDY Lacy  11/20/2019   09:28 AM)    MICROSCOPIC:  Microscopic examination was performed.    The technical component of this testing was completed at the [facility], with the professional component performed   at the [facility], [address] [place] [id number] ([phone])    CPT Codes:  A: 04380-DS6  B: 24941-ZQ4  C: 51680-XP3, 35732-QZS, 40784-ZVX, 80662-HTG  D: 90887-FR0  E: 39240-WM7    COLLECTION SITE:  Client: [facility]  Location: [facility] (B)    Resident  KER         Dr Romeo Weight     "

## 2019-11-19 NOTE — BRIEF OP NOTE
[facility], [place]    Brief Operative Note    Pre-operative diagnosis: Prostate cancer (H) [C61]  Post-operative diagnosis Same as pre-operative diagnosis    Procedure: Procedure(s):  davinci assisted bilateral pelvic lymph node dissection  Surgeon: Surgeon(s) and Role:     * Ousmane Ronquillo MD - Primary     * Edinson Kothari MD - Resident - Assisting     * Holli Bolanos MD - Resident - Assisting     * Ousmane Tompkins MD - Resident - Observing     * Shane Mazariegos MD - Fellow - Assisting  Anesthesia: Combined General with Block   Estimated blood loss: Minimal  Drains: None  Specimens:   ID Type Source Tests Collected by Time Destination   A : right pelvic lymph nodes Tissue Lymph Node, Right Pelvic SURGICAL PATHOLOGY EXAM Ousmane Ronquillo MD 11/19/2019  4:26 PM    B : right iliac lymph nodes Tissue Other SURGICAL PATHOLOGY EXAM Ousmane Ronquillo MD 11/19/2019  4:27 PM    C : right common iliac nodes, PET positive Tissue Other SURGICAL PATHOLOGY EXAM Ousmane Ronquillo MD 11/19/2019  4:28 PM    D : right deep obturator lymph nodes Tissue Lymph Node SURGICAL PATHOLOGY EXAM Ousmane Ronquillo MD 11/19/2019  4:29 PM    E : left pelvic lymph nodes Tissue Lymph Node, Left Pelvic SURGICAL PATHOLOGY EXAM Ousmane Ronquillo MD 11/19/2019  4:50 PM      Findings:   None.  Complications: None.  Implants: * No implants in log *

## 2019-11-20 NOTE — ANESTHESIA POSTPROCEDURE EVALUATION
Anesthesia POST Procedure Evaluation    Patient: Zander Stallworth   MRN:     [id number] Gender:   male   Age:    63 year old :      1955        Preoperative Diagnosis: Prostate cancer (H) [C61]   Procedure(s):  davinci assisted bilateral pelvic lymph node dissection   Postop Comments: No value filed.       Anesthesia Type:  Not documented  No value filed.    Reportable Event: NO     PAIN: Uncomplicated   Sign Out status: Comfortable, Well controlled pain     PONV: No PONV   Sign Out status:  No Nausea or Vomiting     Neuro/Psych: Uneventful perioperative course   Sign Out Status: Preoperative baseline; Age appropriate mentation     Airway/Resp.: Uneventful perioperative course   Sign Out Status: Non labored breathing, age appropriate RR; Resp. Status within EXPECTED Parameters     CV: Uneventful perioperative course   Sign Out status: Appropriate BP and perfusion indices; Appropriate HR/Rhythm     Disposition:   Sign Out in:  PACU  Disposition:  Phase II; Home  Recovery Course: Uneventful  Follow-Up: Not required           Last Anesthesia Record Vitals:  CRNA VITALS  2019 1641 - 2019 1741      2019             NIBP:  151/71    Pulse:  58    SpO2:  100 %    Resp Rate (observed):  16          Last PACU Vitals:  Vitals Value Taken Time   /71 2019  6:30 PM   Temp 36.7  C (98  F) 2019  6:15 PM   Pulse 62 2019  6:30 PM   Resp 14 2019  6:30 PM   SpO2 97 % 2019  6:30 PM   Temp src     NIBP 151/71 2019  5:17 PM   Pulse 58 2019  5:17 PM   SpO2 100 % 2019  5:17 PM   Resp     Temp     Ht Rate     Temp 2           Electronically Signed By: Fernandez Gray MD, 2019, 7:49 PM

## 2019-11-20 NOTE — OR NURSING
Dr. Gray notified patient meets phase 1 discharge criteria. Per Dr. Gray patient okay to transfer to , he will see patient there prior to discharge.

## 2019-11-21 ENCOUNTER — NURSE TRIAGE (OUTPATIENT)
Dept: NURSING | Facility: CLINIC | Age: 64
End: 2019-11-21

## 2019-11-21 NOTE — TELEPHONE ENCOUNTER
"Service contacted: Urology  Surgery:  11/19/19 Dr Ronquillo  :davinci assisted bilateral pelvic lymph node dissection  Today's concern: Collarbone discomfort, right    Collarbone discomfort right , tenderness, no bruising or swelling, certain positions are quite uncomfortable.  No c/o UE dysfunction or N/T of fingers/hands/arms.   \"This is actually worse than any pain at operative site\".   Bothersome rather than acutely painful.   ? Is this due to positioning during procedure.  Per op note\" kept supine with arms tucked by the side and adequately strapped and padded.  A tilt test was performed prior to the procedure \"     He is using tylenol/ Ibuprofen for pain control currently, not using prescribed Oxycodone.   He is also using hotpack to areawith timer. He may try cold packs to area for relief..     Incision: davinci incisions are not reason for call, he feels like he is doing as expected postoperatively.    Query is directed at collarbone pain. Per op note. Supine position, tilt test performed.      AVS review:*yes    Plan: High priority message to Urology    MYChart:Active     Follow up/ next appt: 12/5/19 Dr Ronquillo       "

## 2019-11-22 NOTE — TELEPHONE ENCOUNTER
"Patient was notified that this could be from positioning or from \"air\" that is placed during the procedure. Patient states that his pain has subsided. Patient was informed continues to have pain, should be seen in ER. Patient agreed with the plan.      Usha Epperson MA  "

## 2019-11-22 NOTE — TELEPHONE ENCOUNTER
"Can you please tell him this could be from positioning or from \"air\" that is placed during the procedure. If he continues to have pain, should be seen in ER. Thank you.      Digna Santos RNCC  "

## 2019-11-25 LAB — COPATH REPORT: NORMAL

## 2019-11-27 ENCOUNTER — PATIENT OUTREACH (OUTPATIENT)
Dept: UROLOGY | Facility: CLINIC | Age: 64
End: 2019-11-27

## 2019-12-04 ENCOUNTER — OFFICE VISIT (OUTPATIENT)
Dept: UROLOGY | Facility: CLINIC | Age: 64
End: 2019-12-04
Payer: MEDICARE

## 2019-12-04 VITALS
WEIGHT: 175 LBS | HEART RATE: 71 BPM | BODY MASS INDEX: 25.05 KG/M2 | HEIGHT: 70 IN | SYSTOLIC BLOOD PRESSURE: 104 MMHG | DIASTOLIC BLOOD PRESSURE: 58 MMHG

## 2019-12-04 DIAGNOSIS — C61 MALIGNANT NEOPLASM OF PROSTATE (H): Primary | ICD-10-CM

## 2019-12-04 ASSESSMENT — PAIN SCALES - GENERAL: PAINLEVEL: NO PAIN (1)

## 2019-12-04 ASSESSMENT — MIFFLIN-ST. JEOR: SCORE: 1585.04

## 2019-12-04 NOTE — PATIENT INSTRUCTIONS
Follow up with Dr. Schreiber. [phone]  PSA.      It was a pleasure meeting with you today.  Thank you for allowing me and my team the privilege of caring for you today.  YOU are the reason we are here, and I truly hope we provided you with the excellent service you deserve.  Please let us know if there is anything else we can do for you so that we can be sure you are leaving completely satisfied with your care experience.

## 2019-12-04 NOTE — LETTER
2019       RE: Zander Stallworth  [address]  [place] [address]     Dear Colleague,    Thank you for referring your patient, Zander Stallworth, to the [facility] UROLOGY AND INST FOR PROSTATE AND UROLOGIC CANCERS at [facility]. Please see a copy of my visit note below.      Urology Clinic    Ousmane Ronquillo MD  Date of Service: 2019     Name: Zander Stallworth  MRN: [id number]  Age: 63 year old  : 1955  Referring provider: Provider Not In System     Assessment and Plan:  Assessment:  Zander Stallworth  is a 63 year old male with history of prostate cancer s/p radical prostatectomy with bilateral pelvic lymph node dissection on 2017 with subsequent rising of PSA and PET scan in 2019 suspicious for metastatic prostate cancer involving two pelvic lymph nodes now s/p bilateral pelvic lymph node dissection on 2019 with 2/21 lymph nodes positive for metastatic prostatic adenocarcinoma (right common iliac lymph nodes). Pathology did show some neuroendocrine features.     Plan:  We had a discussion about the significance of neuroendocrine features. I suspect that no systemic treatment will be indicated at this point. We will get him set up for follow up with Dr. Schreiber to discuss consideration of radiation. I will also discuss his case with Dr. Hoang at our conference next week.     - Repeat PSA at 6 weeks post-op.   - Follow up towards the end of radiation if this is pursued.   ______________________________________________________________________    HPI  Zander Stallworth is a 63 year old male with history of prostate cancer s/p radical prostatectomy with bilateral pelvic lymph node dissection on 2017 with history of rising PSA and PET scan (2019) suspicious for metastatic prostate cancer involving two pelvic lymph nodes.  He underwent bilateral pelvic lymph node dissection on 2019. Pathology revealed 2/5 right common iliac lymph nodes positive for  "metastatic prostatic adenocarcinoma.     Today he is doing well overall. His incisions have been healing well. Over the past several days, he has developed dull pain in the groin. He has some soreness walking up the stairs. He did have some bruising after surgery but pain began just several days ago. He only required narcotic pain medications for one day. He has had a good appetite. He did have hemorrhoids or fissures prior to surgery and went several days without a bowel movement after surgery. His bowel movements since have been painful and he has had to strain. Pain is improving with hydrocortisone suppositories and baths seem to be helping. He has been using 1 stool softener per day.     He has a colonoscopy scheduled in the near future.     After surgery:  Initial PSA: 2.7.  Pathologic Albany Score was 3 + 4.  Grade Group:2.  Biopsy Albany Score was 5 + 3.  Pathologic Stage [id number].   Positive Margins: No   Number of Lymph Nodes Removed: 3.  Number of Positive Lymph Nodes: 0.  Patient is status post robotic radical prostatectomy on 08/07/2017.       PSA:   09/30/2019 0.2   08/29/2019 0.2   05/20/2019 0.1   02/14/2019 0.1   08/14/2018 <0.1   02/12/2018 <0.1   11/14/2017 <0.1   05/12/2017 2.7   06/10/2015 1.99   09/26/2014 1.65   04/23/2012 1.46   02/11/2011 1.52   11/17/2009 1.26   01/18/2008 1.25   07/02/2003 1.00     Review of Systems:   Pertinent items are noted in HPI or as below, remainder of complete ROS is negative.      Physical Exam:   Patient is a 63 year old  male   Vitals: Blood pressure 104/58, pulse 71, height 1.778 m (5' 10\"), weight 79.4 kg (175 lb).  General Appearance Adult: Alert, no acute distress, oriented  HENT: throat/mouth:normal, good dentition  Lungs: no respiratory distress, or pursed lip breathing  Heart: No obvious jugular venous distension present  Abdomen: Body mass index is 25.11 kg/m . Incisions healing well.   Musculoskeltal: extremities normal  Skin: no visible suspicious " lesions or rashes  Neuro: Alert, oriented, speech and mentation normal  Psych: affect and mood normal  Gait: Normal  : deferred    Laboratory:   I reviewed all applicable laboratory and pathology data and went over findings with patient  Significant for     Surgical pathology 11/19/2019:     SPECIMEN(S):   A: Lymph nodes, right pelvic   B: Lymph nodes, right iliac   C: Lymph nodes, right common iliac   D: Lymph nodes, right deep obturator   E: Lymph nodes, left pelvic     FINAL DIAGNOSIS:   A. Lymph nodes, right pelvic, excision:   - Seven benign lymph nodes (0/7)     B. Lymph nodes, right iliac, excision:   - Six benign lymph nodes (0/6)     C. Lymph nodes, right common iliac, excision:        - Two of five lymph nodes positive for metastatic prostatic   adenocarcinoma (2/5); please see comment   - Largest metastatic deposit: 1.1 cm   - Extranodal extension is present     D. Lymph nodes, right deep obturator, excision:   - Benign soft tissue   - No lymph nodes identified     E. Lymph nodes, left pelvic, excision:   - Three benign lymph nodes (0/3)     Lab Results   Component Value Date    CR 0.80 11/11/2019       Imaging:   I personally reviewed all applicable imaging and went over the below findings with patient.    PET scan 09/20/2019:     INDICATION: biochemical recurrence with rising PSA after prostatectomy 2017  COMPARISON: CT and whole-body bone scan from 06/21/2017 are reviewed.  TECHNIQUE: 3 to 5 minutes post intravenous administration of 12.1 mCi F-18 fluciclovine, PET imaging was performed from the skull base to mid thighs utilizing attenuation correction with concurrent axial CT and PET/CT image fusion. Dose reduction techniques were used.    FINDINGS: Fluciclovine-avid lymph nodes measuring 0.8 x 0.6 cm in the right internal iliac chain along the right pelvic sidewall and 0.5 x 0.4 cm in the right common iliac chain just anterior to the L5 vertebral body. Injection artifact right arm. Fluciclovine  activity in the remainder of the body is normal.    Prostatectomy. Mild calcified atherosclerosis, including mid left anterior descending coronary disease. Pelvic phleboliths. Mild degenerative change in the spine.    CONCLUSION:  Findings suspicious for metastatic prostate cancer involving two pelvic lymph nodes  Per radiology.     Scribe Disclosure:  I, Kamilah Wolff, am serving as a scribe to document services personally performed by Ousmane Ronquillo MD at this visit, based upon the provider's statements to me. All documentation has been reviewed by the aforementioned provider prior to being entered into the official medical record.           Urology Clinic    Ousmane Ronquillo MD  Date of Service: 2019     Name: Zander Stallworth  MRN: [id number]  Age: 63 year old  : 1955  Referring provider: Provider Not In System     Assessment and Plan:  Assessment:  Zander Stallworth  is a 63 year old male with history of prostate cancer s/p radical prostatectomy with bilateral pelvic lymph node dissection on 2017 with subsequent rising of PSA and PET scan in 2019 suspicious for metastatic prostate cancer involving two pelvic lymph nodes now s/p salvage bilateral pelvic lymph node dissection on 2019 with 2/21 lymph nodes positive for metastatic prostatic adenocarcinoma (right common iliac lymph nodes). Pathology did show some neuroendocrine features.     Plan:  We had a discussion about the significance of neuroendocrine features. I suspect that no systemic treatment will be indicated at this point, but would be worthwhile meeting with Dr. Hoang. We will get him set up for follow up with Dr. Schreiber to discuss consideration of adjuvant radiation. I will also discuss his case with Dr. Hoang at our conference next week.     - Repeat PSA at 6 weeks post-op.   - Follow up towards the end of radiation if this is pursued.      Attestation:  This patient was seen and evaluated by me, with a scribe taking  notes.  I have reviewed the note above and agree.  The physical exam and or any procedures were performed by me and the pertinant details are outlined below.       Ousmane Ronquillo MD  Department of Urology  [facility]    ______________________________________________________________________    HPI  Zander Stlalworth is a 63 year old male with history of prostate cancer s/p radical prostatectomy with bilateral pelvic lymph node dissection on 08/07/2017 with history of rising PSA and PET scan (09/2019) suspicious for metastatic prostate cancer involving two pelvic lymph nodes.  He underwent bilateral pelvic lymph node dissection on 11/19/2019. Pathology revealed 2/5 right common iliac lymph nodes positive for metastatic prostatic adenocarcinoma.     Today he is doing well overall. His incisions have been healing well. Over the past several days, he has developed dull pain in the groin. He has some soreness walking up the stairs. He did have some bruising after surgery but pain began just several days ago. He only required narcotic pain medications for one day. He has had a good appetite. He did have hemorrhoids or fissures prior to surgery and went several days without a bowel movement after surgery. His bowel movements since have been painful and he has had to strain. Pain is improving with hydrocortisone suppositories and baths seem to be helping. He has been using 1 stool softener per day.     He has a colonoscopy scheduled in the near future.     After surgery:  Initial PSA: 2.7.  Pathologic Manning Score was 3 + 4.  Grade Group:2.  Biopsy Hallie Score was 5 + 3.  Pathologic Stage [id number].   Positive Margins: No   Number of Lymph Nodes Removed: 3.  Number of Positive Lymph Nodes: 0.  Patient is status post robotic radical prostatectomy on 08/07/2017.       PSA:   09/30/2019 0.2   08/29/2019 0.2   05/20/2019 0.1   02/14/2019 0.1   08/14/2018 <0.1   02/12/2018 <0.1   11/14/2017 <0.1   05/12/2017 2.7  "  06/10/2015 1.99   09/26/2014 1.65   04/23/2012 1.46   02/11/2011 1.52   11/17/2009 1.26   01/18/2008 1.25   07/02/2003 1.00     Review of Systems:   Pertinent items are noted in HPI or as below, remainder of complete ROS is negative.      Physical Exam:   Patient is a 63 year old  male   Vitals: Blood pressure 104/58, pulse 71, height 1.778 m (5' 10\"), weight 79.4 kg (175 lb).  General Appearance Adult: Alert, no acute distress, oriented  HENT: throat/mouth:normal, good dentition  Lungs: no respiratory distress, or pursed lip breathing  Heart: No obvious jugular venous distension present  Abdomen: Body mass index is 25.11 kg/m . Incisions healing well.   Musculoskeltal: extremities normal  Skin: no visible suspicious lesions or rashes  Neuro: Alert, oriented, speech and mentation normal  Psych: affect and mood normal  Gait: Normal  : deferred    Laboratory:   I reviewed all applicable laboratory and pathology data and went over findings with patient  Significant for     Surgical pathology 11/19/2019:     SPECIMEN(S):   A: Lymph nodes, right pelvic   B: Lymph nodes, right iliac   C: Lymph nodes, right common iliac   D: Lymph nodes, right deep obturator   E: Lymph nodes, left pelvic     FINAL DIAGNOSIS:   A. Lymph nodes, right pelvic, excision:   - Seven benign lymph nodes (0/7)     B. Lymph nodes, right iliac, excision:   - Six benign lymph nodes (0/6)     C. Lymph nodes, right common iliac, excision:        - Two of five lymph nodes positive for metastatic prostatic   adenocarcinoma (2/5); please see comment   - Largest metastatic deposit: 1.1 cm   - Extranodal extension is present     D. Lymph nodes, right deep obturator, excision:   - Benign soft tissue   - No lymph nodes identified     E. Lymph nodes, left pelvic, excision:   - Three benign lymph nodes (0/3)     Lab Results   Component Value Date    CR 0.80 11/11/2019       Imaging:   I personally reviewed all applicable imaging and went over the below " findings with patient.    PET scan 09/20/2019:     INDICATION: biochemical recurrence with rising PSA after prostatectomy 2017  COMPARISON: CT and whole-body bone scan from 06/21/2017 are reviewed.  TECHNIQUE: 3 to 5 minutes post intravenous administration of 12.1 mCi F-18 fluciclovine, PET imaging was performed from the skull base to mid thighs utilizing attenuation correction with concurrent axial CT and PET/CT image fusion. Dose reduction techniques were used.    FINDINGS: Fluciclovine-avid lymph nodes measuring 0.8 x 0.6 cm in the right internal iliac chain along the right pelvic sidewall and 0.5 x 0.4 cm in the right common iliac chain just anterior to the L5 vertebral body. Injection artifact right arm. Fluciclovine activity in the remainder of the body is normal.    Prostatectomy. Mild calcified atherosclerosis, including mid left anterior descending coronary disease. Pelvic phleboliths. Mild degenerative change in the spine.    CONCLUSION:  Findings suspicious for metastatic prostate cancer involving two pelvic lymph nodes  Per radiology.     Scribe Disclosure:  IKamilah, am serving as a scribe to document services personally performed by Ousmane Ronquillo MD at this visit, based upon the provider's statements to me. All documentation has been reviewed by the aforementioned provider prior to being entered into the official medical record.         Again, thank you for allowing me to participate in the care of your patient.      Sincerely,    Ousmane Ronquillo MD

## 2019-12-04 NOTE — NURSING NOTE
"Post Op-11/19/19 of davinci assisted bilateral pelvic lymph node dissection    He states things are fine but has some aches in his R groin, but otherwise is doing well.          Chief Complaint   Patient presents with     Surgical Followup     2 week post op       Blood pressure 104/58, pulse 71, height 1.778 m (5' 10\"), weight 79.4 kg (175 lb). Body mass index is 25.11 kg/m .    Patient Active Problem List   Diagnosis     Acquired deformity of ankle and foot     Blepharitis     Dry eyes     Herpes simplex disciform keratitis     Hypercholesterolemia     Lesion of plantar nerve     Malabsorption of glucose     Malignant neoplasm of prostate (H)     Metatarsalgia     Presbyopia     Sensorineural hearing loss, bilateral     Prostate cancer (H)       Allergies   Allergen Reactions     Amoxicillin-Pot Clavulanate      Itching      Erythromycin      Sildenafil      Visual changes with sildenafil.        Current Outpatient Medications   Medication Sig Dispense Refill     fexofenadine (ALLEGRA) 60 MG tablet Take 60 mg by mouth every morning        fluticasone (FLONASE) 50 MCG/ACT nasal spray Spray 2 sprays into both nostrils daily        hydrocortisone (ANUCORT-HC) 25 MG suppository Place 25 mg rectally 2 times daily PRN       metFORMIN (GLUCOPHAGE) 500 MG tablet Take 500 mg by mouth daily (with breakfast)        Multiple Vitamins-Minerals (CENTRUM SILVER PO) Take by mouth every morning        SENNA-docusate sodium (SENNA S) 8.6-50 MG tablet Take 1 tablet by mouth At Bedtime 20 tablet 0     simvastatin (ZOCOR) 10 MG tablet At Bedtime        tadalafil (CIALIS) 5 MG tablet TAKE 4 TABLETS BY MOUTH TWO TIMES PER WEEK AS NEEDED  9     hydrocortisone 2.5 % cream Apply to inflamed skin 2-3 times daily until clear but not more than 2-3 weeks at a time         Social History     Tobacco Use     Smoking status: Never Smoker     Smokeless tobacco: Never Used   Substance Use Topics     Alcohol use: Yes     Comment: 10 drinks/week     " Drug use: Never       Leoncio Short, EMT, EMT  12/4/2019  7:01 AM

## 2019-12-04 NOTE — PROGRESS NOTES
Urology Clinic    Ousmane Ronquillo MD  Date of Service: 2019     Name: Zander Stallworth  MRN: [id number]  Age: 63 year old  : 1955  Referring provider: Provider Not In System     Assessment and Plan:  Assessment:  Zander Stallworth  is a 63 year old male with history of prostate cancer s/p radical prostatectomy with bilateral pelvic lymph node dissection on 2017 with subsequent rising of PSA and PET scan in 2019 suspicious for metastatic prostate cancer involving two pelvic lymph nodes now s/p bilateral pelvic lymph node dissection on 2019 with 2/21 lymph nodes positive for metastatic prostatic adenocarcinoma (right common iliac lymph nodes). Pathology did show some neuroendocrine features.     Plan:  We had a discussion about the significance of neuroendocrine features. I suspect that no systemic treatment will be indicated at this point. We will get him set up for follow up with Dr. Schreiber to discuss consideration of radiation. I will also discuss his case with Dr. Hoang at our conference next week.     - Repeat PSA at 6 weeks post-op.   - Follow up towards the end of radiation if this is pursued.   ______________________________________________________________________    HPI  Zander Stallworth is a 63 year old male with history of prostate cancer s/p radical prostatectomy with bilateral pelvic lymph node dissection on 2017 with history of rising PSA and PET scan (2019) suspicious for metastatic prostate cancer involving two pelvic lymph nodes. He underwent bilateral pelvic lymph node dissection on 2019. Pathology revealed 2/5 right common iliac lymph nodes positive for metastatic prostatic adenocarcinoma.     Today he is doing well overall. His incisions have been healing well. Over the past several days, he has developed dull pain in the groin. He has some soreness walking up the stairs. He did have some bruising after surgery but pain began just several days ago. He only required  "narcotic pain medications for one day. He has had a good appetite. He did have hemorrhoids or fissures prior to surgery and went several days without a bowel movement after surgery. His bowel movements since have been painful and he has had to strain. Pain is improving with hydrocortisone suppositories and baths seem to be helping. He has been using 1 stool softener per day.     He has a colonoscopy scheduled in the near future.     After surgery:  Initial PSA: 2.7.  Pathologic Hallie Score was 3 + 4.  Grade Group:2.  Biopsy Stowell Score was 5 + 3.  Pathologic Stage [id number].   Positive Margins: No   Number of Lymph Nodes Removed: 3.  Number of Positive Lymph Nodes: 0.  Patient is status post robotic radical prostatectomy on 08/07/2017.       PSA:   09/30/2019 0.2   08/29/2019 0.2   05/20/2019 0.1   02/14/2019 0.1   08/14/2018 <0.1   02/12/2018 <0.1   11/14/2017 <0.1   05/12/2017 2.7   06/10/2015 1.99   09/26/2014 1.65   04/23/2012 1.46   02/11/2011 1.52   11/17/2009 1.26   01/18/2008 1.25   07/02/2003 1.00     Review of Systems:   Pertinent items are noted in HPI or as below, remainder of complete ROS is negative.      Physical Exam:   Patient is a 63 year old  male   Vitals: Blood pressure 104/58, pulse 71, height 1.778 m (5' 10\"), weight 79.4 kg (175 lb).  General Appearance Adult: Alert, no acute distress, oriented  HENT: throat/mouth:normal, good dentition  Lungs: no respiratory distress, or pursed lip breathing  Heart: No obvious jugular venous distension present  Abdomen: Body mass index is 25.11 kg/m . Incisions healing well.   Musculoskeltal: extremities normal  Skin: no visible suspicious lesions or rashes  Neuro: Alert, oriented, speech and mentation normal  Psych: affect and mood normal  Gait: Normal  : deferred    Laboratory:   I reviewed all applicable laboratory and pathology data and went over findings with patient  Significant for     Surgical pathology 11/19/2019:     SPECIMEN(S):   A: Lymph " nodes, right pelvic   B: Lymph nodes, right iliac   C: Lymph nodes, right common iliac   D: Lymph nodes, right deep obturator   E: Lymph nodes, left pelvic     FINAL DIAGNOSIS:   A. Lymph nodes, right pelvic, excision:   - Seven benign lymph nodes (0/7)     B. Lymph nodes, right iliac, excision:   - Six benign lymph nodes (0/6)     C. Lymph nodes, right common iliac, excision:        - Two of five lymph nodes positive for metastatic prostatic   adenocarcinoma (2/5); please see comment   - Largest metastatic deposit: 1.1 cm   - Extranodal extension is present     D. Lymph nodes, right deep obturator, excision:   - Benign soft tissue   - No lymph nodes identified     E. Lymph nodes, left pelvic, excision:   - Three benign lymph nodes (0/3)     Lab Results   Component Value Date    CR 0.80 11/11/2019       Imaging:   I personally reviewed all applicable imaging and went over the below findings with patient.    PET scan 09/20/2019:     INDICATION: biochemical recurrence with rising PSA after prostatectomy 2017  COMPARISON: CT and whole-body bone scan from 06/21/2017 are reviewed.  TECHNIQUE: 3 to 5 minutes post intravenous administration of 12.1 mCi F-18 fluciclovine, PET imaging was performed from the skull base to mid thighs utilizing attenuation correction with concurrent axial CT and PET/CT image fusion. Dose reduction techniques were used.    FINDINGS: Fluciclovine-avid lymph nodes measuring 0.8 x 0.6 cm in the right internal iliac chain along the right pelvic sidewall and 0.5 x 0.4 cm in the right common iliac chain just anterior to the L5 vertebral body. Injection artifact right arm. Fluciclovine activity in the remainder of the body is normal.    Prostatectomy. Mild calcified atherosclerosis, including mid left anterior descending coronary disease. Pelvic phleboliths. Mild degenerative change in the spine.    CONCLUSION:  Findings suspicious for metastatic prostate cancer involving two pelvic lymph nodes  Per  radiology.     Scribe Disclosure:  I, Kamilah Wolff, am serving as a scribe to document services personally performed by Ousmane Ronquillo MD at this visit, based upon the provider's statements to me. All documentation has been reviewed by the aforementioned provider prior to being entered into the official medical record.

## 2019-12-05 ENCOUNTER — TELEPHONE (OUTPATIENT)
Dept: RADIATION ONCOLOGY | Facility: HOSPITAL | Age: 64
End: 2019-12-05

## 2019-12-05 DIAGNOSIS — C61 MALIGNANT NEOPLASM OF PROSTATE (H): Primary | ICD-10-CM

## 2019-12-05 NOTE — TELEPHONE ENCOUNTER
The patient called regarding treatment plan. He is recovering from the LN surgery where 2/5 LN+.    I recommend recovering from surgery and repeating PSA prior to starting Lupron. It appears radiotherapy will begin near 3/2020 due to the patient's travel plans. However, I recommend Lupron to start earlier    ABRAHAM Schreiber M.D.  Department of Radiation Oncology  [facility]

## 2019-12-08 NOTE — PROGRESS NOTES
Urology Clinic    Ousmane Ronquillo MD  Date of Service: 2019     Name: Zander Stallworth  MRN: [id number]  Age: 63 year old  : 1955  Referring provider: Provider Not In System     Assessment and Plan:  Assessment:  Zander Stallworth  is a 63 year old male with history of prostate cancer s/p radical prostatectomy with bilateral pelvic lymph node dissection on 2017 with subsequent rising of PSA and PET scan in 2019 suspicious for metastatic prostate cancer involving two pelvic lymph nodes now s/p salvage bilateral pelvic lymph node dissection on 2019 with 2/21 lymph nodes positive for metastatic prostatic adenocarcinoma (right common iliac lymph nodes). Pathology did show some neuroendocrine features.     Plan:  We had a discussion about the significance of neuroendocrine features. I suspect that no systemic treatment will be indicated at this point, but would be worthwhile meeting with Dr. Hoang. We will get him set up for follow up with Dr. Schreiber to discuss consideration of adjuvant radiation. I will also discuss his case with Dr. Hoang at our conference next week.     - Repeat PSA at 6 weeks post-op.   - Follow up towards the end of radiation if this is pursued.      Attestation:  This patient was seen and evaluated by me, with a scribe taking notes.  I have reviewed the note above and agree.  The physical exam and or any procedures were performed by me and the pertinant details are outlined below.       Ousmane Ronquillo MD  Department of Urology  [facility]    ______________________________________________________________________    HPI  Zander Stallworth is a 63 year old male with history of prostate cancer s/p radical prostatectomy with bilateral pelvic lymph node dissection on 2017 with history of rising PSA and PET scan (2019) suspicious for metastatic prostate cancer involving two pelvic lymph nodes. He underwent bilateral pelvic lymph node dissection on 2019.  "Pathology revealed 2/5 right common iliac lymph nodes positive for metastatic prostatic adenocarcinoma.     Today he is doing well overall. His incisions have been healing well. Over the past several days, he has developed dull pain in the groin. He has some soreness walking up the stairs. He did have some bruising after surgery but pain began just several days ago. He only required narcotic pain medications for one day. He has had a good appetite. He did have hemorrhoids or fissures prior to surgery and went several days without a bowel movement after surgery. His bowel movements since have been painful and he has had to strain. Pain is improving with hydrocortisone suppositories and baths seem to be helping. He has been using 1 stool softener per day.     He has a colonoscopy scheduled in the near future.     After surgery:  Initial PSA: 2.7.  Pathologic Hallie Score was 3 + 4.  Grade Group:2.  Biopsy Hallie Score was 5 + 3.  Pathologic Stage [id number].   Positive Margins: No   Number of Lymph Nodes Removed: 3.  Number of Positive Lymph Nodes: 0.  Patient is status post robotic radical prostatectomy on 08/07/2017.       PSA:   09/30/2019 0.2   08/29/2019 0.2   05/20/2019 0.1   02/14/2019 0.1   08/14/2018 <0.1   02/12/2018 <0.1   11/14/2017 <0.1   05/12/2017 2.7   06/10/2015 1.99   09/26/2014 1.65   04/23/2012 1.46   02/11/2011 1.52   11/17/2009 1.26   01/18/2008 1.25   07/02/2003 1.00     Review of Systems:   Pertinent items are noted in HPI or as below, remainder of complete ROS is negative.      Physical Exam:   Patient is a 63 year old  male   Vitals: Blood pressure 104/58, pulse 71, height 1.778 m (5' 10\"), weight 79.4 kg (175 lb).  General Appearance Adult: Alert, no acute distress, oriented  HENT: throat/mouth:normal, good dentition  Lungs: no respiratory distress, or pursed lip breathing  Heart: No obvious jugular venous distension present  Abdomen: Body mass index is 25.11 kg/m . Incisions healing well. "   Musculoskeltal: extremities normal  Skin: no visible suspicious lesions or rashes  Neuro: Alert, oriented, speech and mentation normal  Psych: affect and mood normal  Gait: Normal  : deferred    Laboratory:   I reviewed all applicable laboratory and pathology data and went over findings with patient  Significant for     Surgical pathology 11/19/2019:     SPECIMEN(S):   A: Lymph nodes, right pelvic   B: Lymph nodes, right iliac   C: Lymph nodes, right common iliac   D: Lymph nodes, right deep obturator   E: Lymph nodes, left pelvic     FINAL DIAGNOSIS:   A. Lymph nodes, right pelvic, excision:   - Seven benign lymph nodes (0/7)     B. Lymph nodes, right iliac, excision:   - Six benign lymph nodes (0/6)     C. Lymph nodes, right common iliac, excision:        - Two of five lymph nodes positive for metastatic prostatic   adenocarcinoma (2/5); please see comment   - Largest metastatic deposit: 1.1 cm   - Extranodal extension is present     D. Lymph nodes, right deep obturator, excision:   - Benign soft tissue   - No lymph nodes identified     E. Lymph nodes, left pelvic, excision:   - Three benign lymph nodes (0/3)     Lab Results   Component Value Date    CR 0.80 11/11/2019       Imaging:   I personally reviewed all applicable imaging and went over the below findings with patient.    PET scan 09/20/2019:     INDICATION: biochemical recurrence with rising PSA after prostatectomy 2017  COMPARISON: CT and whole-body bone scan from 06/21/2017 are reviewed.  TECHNIQUE: 3 to 5 minutes post intravenous administration of 12.1 mCi F-18 fluciclovine, PET imaging was performed from the skull base to mid thighs utilizing attenuation correction with concurrent axial CT and PET/CT image fusion. Dose reduction techniques were used.    FINDINGS: Fluciclovine-avid lymph nodes measuring 0.8 x 0.6 cm in the right internal iliac chain along the right pelvic sidewall and 0.5 x 0.4 cm in the right common iliac chain just anterior to the  L5 vertebral body. Injection artifact right arm. Fluciclovine activity in the remainder of the body is normal.    Prostatectomy. Mild calcified atherosclerosis, including mid left anterior descending coronary disease. Pelvic phleboliths. Mild degenerative change in the spine.    CONCLUSION:  Findings suspicious for metastatic prostate cancer involving two pelvic lymph nodes  Per radiology.     Scribe Disclosure:  Kamilah ARCINIEGA, am serving as a scribe to document services personally performed by Ousmane Ronquillo MD at this visit, based upon the provider's statements to me. All documentation has been reviewed by the aforementioned provider prior to being entered into the official medical record.

## 2019-12-10 DIAGNOSIS — R97.20 ELEVATED PROSTATE SPECIFIC ANTIGEN (PSA): ICD-10-CM

## 2019-12-10 DIAGNOSIS — C61 MALIGNANT NEOPLASM OF PROSTATE (H): Primary | ICD-10-CM

## 2019-12-17 ENCOUNTER — APPOINTMENT (OUTPATIENT)
Dept: LAB | Facility: CLINIC | Age: 64
End: 2019-12-17
Attending: INTERNAL MEDICINE
Payer: MEDICARE

## 2019-12-17 ENCOUNTER — ONCOLOGY VISIT (OUTPATIENT)
Dept: ONCOLOGY | Facility: CLINIC | Age: 64
End: 2019-12-17
Attending: INTERNAL MEDICINE
Payer: MEDICARE

## 2019-12-17 VITALS
HEART RATE: 60 BPM | BODY MASS INDEX: 26.6 KG/M2 | TEMPERATURE: 97.5 F | DIASTOLIC BLOOD PRESSURE: 68 MMHG | WEIGHT: 185.8 LBS | RESPIRATION RATE: 16 BRPM | OXYGEN SATURATION: 100 % | SYSTOLIC BLOOD PRESSURE: 115 MMHG | HEIGHT: 70 IN

## 2019-12-17 DIAGNOSIS — R97.20 ELEVATED PROSTATE SPECIFIC ANTIGEN (PSA): Primary | ICD-10-CM

## 2019-12-17 DIAGNOSIS — C61 MALIGNANT NEOPLASM OF PROSTATE (H): ICD-10-CM

## 2019-12-17 LAB
ALBUMIN SERPL-MCNC: 4.2 G/DL (ref 3.4–5)
ALP SERPL-CCNC: 76 U/L (ref 40–150)
ALT SERPL W P-5'-P-CCNC: 33 U/L (ref 0–70)
ANION GAP SERPL CALCULATED.3IONS-SCNC: 5 MMOL/L (ref 3–14)
AST SERPL W P-5'-P-CCNC: 17 U/L (ref 0–45)
BASOPHILS # BLD AUTO: 0 10E9/L (ref 0–0.2)
BASOPHILS NFR BLD AUTO: 0.4 %
BILIRUB SERPL-MCNC: 0.6 MG/DL (ref 0.2–1.3)
BUN SERPL-MCNC: 13 MG/DL (ref 7–30)
CALCIUM SERPL-MCNC: 8.9 MG/DL (ref 8.5–10.1)
CHLORIDE SERPL-SCNC: 107 MMOL/L (ref 94–109)
CO2 SERPL-SCNC: 29 MMOL/L (ref 20–32)
CREAT SERPL-MCNC: 0.78 MG/DL (ref 0.66–1.25)
DIFFERENTIAL METHOD BLD: NORMAL
EOSINOPHIL # BLD AUTO: 0.2 10E9/L (ref 0–0.7)
EOSINOPHIL NFR BLD AUTO: 1.8 %
ERYTHROCYTE [DISTWIDTH] IN BLOOD BY AUTOMATED COUNT: 12.7 % (ref 10–15)
GFR SERPL CREATININE-BSD FRML MDRD: >90 ML/MIN/{1.73_M2}
GLUCOSE SERPL-MCNC: 98 MG/DL (ref 70–99)
HCT VFR BLD AUTO: 41.2 % (ref 40–53)
HGB BLD-MCNC: 13.6 G/DL (ref 13.3–17.7)
IMM GRANULOCYTES # BLD: 0 10E9/L (ref 0–0.4)
IMM GRANULOCYTES NFR BLD: 0.4 %
LYMPHOCYTES # BLD AUTO: 1.6 10E9/L (ref 0.8–5.3)
LYMPHOCYTES NFR BLD AUTO: 19.2 %
MCH RBC QN AUTO: 29.7 PG (ref 26.5–33)
MCHC RBC AUTO-ENTMCNC: 33 G/DL (ref 31.5–36.5)
MCV RBC AUTO: 90 FL (ref 78–100)
MONOCYTES # BLD AUTO: 0.6 10E9/L (ref 0–1.3)
MONOCYTES NFR BLD AUTO: 6.8 %
NEUTROPHILS # BLD AUTO: 5.9 10E9/L (ref 1.6–8.3)
NEUTROPHILS NFR BLD AUTO: 71.4 %
NRBC # BLD AUTO: 0 10*3/UL
NRBC BLD AUTO-RTO: 0 /100
PLATELET # BLD AUTO: 164 10E9/L (ref 150–450)
POTASSIUM SERPL-SCNC: 3.9 MMOL/L (ref 3.4–5.3)
PROT SERPL-MCNC: 7.4 G/DL (ref 6.8–8.8)
PSA SERPL-MCNC: 0.34 UG/L (ref 0–4)
RBC # BLD AUTO: 4.58 10E12/L (ref 4.4–5.9)
SODIUM SERPL-SCNC: 141 MMOL/L (ref 133–144)
WBC # BLD AUTO: 8.3 10E9/L (ref 4–11)

## 2019-12-17 PROCEDURE — 84403 ASSAY OF TOTAL TESTOSTERONE: CPT | Performed by: INTERNAL MEDICINE

## 2019-12-17 PROCEDURE — 84153 ASSAY OF PSA TOTAL: CPT | Performed by: INTERNAL MEDICINE

## 2019-12-17 PROCEDURE — 96402 CHEMO HORMON ANTINEOPL SQ/IM: CPT

## 2019-12-17 PROCEDURE — 25000128 H RX IP 250 OP 636: Mod: ZF | Performed by: INTERNAL MEDICINE

## 2019-12-17 PROCEDURE — 36415 COLL VENOUS BLD VENIPUNCTURE: CPT

## 2019-12-17 PROCEDURE — 99215 OFFICE O/P EST HI 40 MIN: CPT | Mod: ZP | Performed by: INTERNAL MEDICINE

## 2019-12-17 PROCEDURE — 86316 IMMUNOASSAY TUMOR OTHER: CPT | Performed by: INTERNAL MEDICINE

## 2019-12-17 PROCEDURE — 80053 COMPREHEN METABOLIC PANEL: CPT | Performed by: INTERNAL MEDICINE

## 2019-12-17 PROCEDURE — 85025 COMPLETE CBC W/AUTO DIFF WBC: CPT | Performed by: INTERNAL MEDICINE

## 2019-12-17 PROCEDURE — G0463 HOSPITAL OUTPT CLINIC VISIT: HCPCS | Mod: ZF

## 2019-12-17 RX ADMIN — LEUPROLIDE ACETATE 22.5 MG: KIT at 17:40

## 2019-12-17 ASSESSMENT — MIFFLIN-ST. JEOR: SCORE: 1629.03

## 2019-12-17 ASSESSMENT — PAIN SCALES - GENERAL: PAINLEVEL: NO PAIN (0)

## 2019-12-17 NOTE — NURSING NOTE
Chief Complaint   Patient presents with     Blood Draw     labs drawn via vpt by rn. vs taken      Blood drawn via vpt by RN in lab. VS taken. Pt checked into next appointment.   Evelyn Orozco RN

## 2019-12-17 NOTE — LETTER
"12/17/2019       RE: Zander Stallworth  [address]  [place] [address]     Dear Colleague,    Thank you for referring your patient, Zander Stallworth, to the [facility]. Please see a copy of my visit note below.    MEDICAL ONCOLOGY CLINIC NOTE    REFERRING PROVIDER: Dr. Ronquillo ([facility])  RADIATION ONCOLOGIST: Doug Schreiber MD    REASON FOR CURRENT VISIT: Recurrent prostate cancer    HISTORY OF PRESENT ILLNESS: Mr. Zander Stallworth is a 63 year old gentleman who was originally referred by Dr. Ronquillo of Urology for evaluation of rising PSA and concern for metastatic prostate cancer. He was diagnosed with prostate cancer initially in 2017 after an abnormal FIDE. TRUS with biopsy revealed Hallie 5+3=8 prostate adenocarcinoma in 9 of 12 cores (invovling 60% of the tissue), with perineural invasion. PSA was 2.7 at that time. CT and bone scan showed no evidence of metastatic disease. He underwent radical prostatectomy on 8/7/17, pathology consistent with [place] 3+4, [id number]. PSA was undetectable after surgery, until February 2019 when PSA was found to be 0.1, up to 0.2 on 8/29/19 and stable at 0.2 on 9/30/19. PET/CT on 9/20/19 revealed findings suspicious for metastatic prostate cancer involving hypermetabolic lymph nodes measuring 0.8 x 0.6 cm in the right internal iliac chain along the right pelvic sidewall and 0.5 x 0.4 cm in the right common iliac chain just anterior to the L5 vertebral body. He was seen by Dr. Ronquillo in urology on 10/3/19 and discussed treatment options including radiation, hormone therapy and pelvic lymph node dissection.    He then underwent a bilateral robot-assisted laparoscopic pelvic lymph node dissection with Dr. Ronquillo on 11/19/19 and pathology showed - \"A. Lymph nodes, right pelvic, excision: - Seven benign lymph nodes (0/7)  B. Lymph nodes, right iliac, excision: - Six benign lymph nodes (0/6)  C. Lymph nodes, right common iliac, excision: - Two of five lymph nodes " "positive for metastatic prostatic adenocarcinoma (2/5); please see comment - Largest metastatic deposit: 1.1 cm - Extranodal extension is present - D. Lymph nodes, right deep obturator, excision: - Benign soft tissue - No lymph nodes identified E. Lymph nodes, left pelvic, excision: - Three benign lymph nodes (0/3).  COMMENT: Immunohistochemical stains show the tumor is positive for synaptophysin. Chromogranin and CD56 show scattered reactivity. While not confirmatory, these results suggest high grade neuroendocrine differentiation.\" Case was reviewed in  tumor board and recommendation made to give ADT+pelvic RT.    He's doing well since surgery except for occasional sharp pain in right groin. Some urgency with intermittent pain in deep pelvis which is relieved after urination. He has no signs/symptoms of disease progression. No abdominal pain, bowel problems, or urinary complaints.     He is retired and maintains a very active lifestyle, walking up to 5 miles per day. Due to see Dr. Abdoul hernández.     REVIEW OF SYSTEMS:  A full 14 point ROS was negative other than the symptoms noted above in the HPI.    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     Hypercholesterolemia 11/27/2009     Lesion of plantar nerve 04/02/2015     Malabsorption of glucose 11/18/2009     Malignant neoplasm of prostate (H) 06/13/2017     Metatarsalgia 04/02/2015     Presbyopia 11/09/2010     Sensorineural hearing loss, bilateral 06/14/2012     Prostate cancer (H) 11/07/2019     Resolved Ambulatory Problems     Diagnosis Date Noted     No Resolved Ambulatory Problems     Past Medical History:   Diagnosis Date     Allergic rhinitis      DM (diabetes mellitus) (H)      Hearing loss      HLD (hyperlipidemia)      PAST SURGICAL HISTORY:  Past Surgical History: "   Procedure Laterality Date     DAVINCI LYMPHADENECTOMY N/A 11/19/2019    Procedure: davinci assisted bilateral pelvic lymph node dissection;  Surgeon: Ousmane Ronquillo MD;  Location: [facility] OR     parotid gland removed  2015    parotidectomy      PROSTATE SURGERY  08/07/2017    radical prostatectomy with bilateral pelvic lymph node dissection      SOCIAL HISTORY:   Social History     Tobacco Use     Smoking status: Never Smoker     Smokeless tobacco: Never Used   Substance Use Topics     Alcohol use: Yes     Comment: 10 drinks/week     Drug use: Never     FAMILY HISTORY:  - Father had dementia in his late 60s, as did his paternal grandfather.  - Father had colon cancer and later lung cancer.    ALLERGIES:  Allergies   Allergen Reactions     Amoxicillin-Pot Clavulanate      Itching      Erythromycin      Sildenafil      Visual changes with sildenafil.      CURRENT MEDICATIONS:  Current Outpatient Medications:      fexofenadine (ALLEGRA) 60 MG tablet, Take 60 mg by mouth every morning , Disp: , Rfl:      fluticasone (FLONASE) 50 MCG/ACT nasal spray, Spray 2 sprays into both nostrils daily , Disp: , Rfl:      hydrocortisone (ANUCORT-HC) 25 MG suppository, Place 25 mg rectally 2 times daily PRN, Disp: , Rfl:      metFORMIN (GLUCOPHAGE) 500 MG tablet, Take 500 mg by mouth daily (with breakfast) , Disp: , Rfl:      Multiple Vitamins-Minerals (CENTRUM SILVER PO), Take by mouth every morning , Disp: , Rfl:      SENNA-docusate sodium (SENNA S) 8.6-50 MG tablet, Take 1 tablet by mouth At Bedtime, Disp: 20 tablet, Rfl: 0     simvastatin (ZOCOR) 10 MG tablet, At Bedtime , Disp: , Rfl:      tadalafil (CIALIS) 5 MG tablet, TAKE 4 TABLETS BY MOUTH TWO TIMES PER WEEK AS NEEDED, Disp: , Rfl: 9  No current facility-administered medications for this visit.     PHYSICAL EXAMINATION:  Vital signs: /68 (BP Location: Right arm, Patient Position: Sitting, Cuff Size: Adult Regular)   Pulse 60   Temp 97.5  F (36.4  C) (Oral)    "Resp 16   Ht 1.778 m (5' 10\")   Wt 84.3 kg (185 lb 12.8 oz)   SpO2 100%   BMI 26.66 kg/m     ECOG performance status of 0.   GENERAL: Well-nourished healthy-appearing man, seated in chair, no acute distress.   HEENT: No icterus, no pallor. Moist mucous membranes. Oropharynx is clear.   NECK: Supple, no JVD/LAD.  LUNGS: Clear to ausculation bilaterally, normal work of breathing.   CARDIOVASCULAR: Regular rate and rhythm, no murmurs, gallops or rubs.   ABDOMEN: Soft, nontender and nondistended, no palpable masses, bowel sounds present.  EXTREMITIES: No cyanosis, no clubbing, no edema.   NEUROLOGIC: Alert and oriented. No focal deficits.     LABORATORY DATA:   Lab Test 12/17/19  1558 11/11/19  1228   WBC 8.3 5.8   RBC 4.58 4.79   HGB 13.6 14.2   HCT 41.2 43.2   MCV 90 90   MCH 29.7 29.6   MCHC 33.0 32.9   RDW 12.7 12.5    164   NEUTROPHIL 71.4  --     139   POTASSIUM 3.9 4.4   CHLORIDE 107 104   CO2 29 30   ANIONGAP 5 4   GLC 98 100*   BUN 13 16   CR 0.78 0.80   JERROD 8.9 8.9   PROTTOTAL 7.4  --    ALBUMIN 4.2  --    BILITOTAL 0.6  --    ALKPHOS 76  --    AST 17  --    ALT 33  --      Lab Test 12/17/19  1558   PSA 0.34   CGAB - Chromogranin A; TESTOSTTOTAL: Total testosterone.    IMAGING STUDIES:  Reviewed Axumin PET Scan from 9/20/19:  - Notable only for two fluciclovine-avid lymph nodes measuring 0.8 x 0.6 cm in the right internal iliac chain along the right pelvic side wall and 0.5 x 0.4 cm in the right common iliac chain just anterior to the L5 vertebral body.  - Findings suspicious for recurrent prostate cancer involving the pelvic lymph nodes.    ASSESSMENT AND PLAN: Mr. Stallworth is a 64 year old man with locally recurrent prostate cancer, here for a follow-up. He is very well informed and has done much research on his own regarding this topic. He is particularly worried about risk of dementia with hormonal therapy.    We had a long discussion with the patient and his wife regarding his initial " diagnosis of intermediate risk prostate cancer (Hallie 3+4), disease course following radical prostatectomy, and the implications of pathology findings from recent surgery.     We suspect this is a regional recurrence of localized prostate cancer rather than truly metastatic disease. However, the finding of neuroendocrine features is highly concerning for either transformation or more likely unchecked growth of a pre-existing poorly-differentiated or neuroendocrine clone. At any rate, it is important that the patient receive additional therapy ASAP due to extracapsular extension of the federica met.     Had a full discussion around what this finding means. There's no clear standard of care for patients in his situation as this carcinoma is unlikely to respond to a more intensive antiandrogen regimen such as abiraterone+ADT. On the other hand, chemoRT similar to that for SCLC is not a proven option either and it does come with a significant risk of adverse events.     I recommended that we start him on ADT at the minimum and add pelvic RT in the coming weeks as recommended by Dr. Schreiber. Discussed side effects of ADT including injection site reactions (bleeding/pain/swelling/injury to surrounding structures), allergic reactions, hot flashes, mood changes, increased risk of CV issues, bone diseases, and possible cognitive dysfunction. The latter are more likely with longer duration ADT.    He's willing to start ADT today. Will give Lupron 22.5mg every 3 monthly dose today. All future doses to be given by Dr. Schreiber, who the patient is slated to see on 12/27/19. I also strongly advised him to not wait until March 2020 to start RT as he clearly does have residual pelvic disease. He's willing to start RT as soon as end of Jan 2020.    I expect that Dr. Schreiber will complete RT and at least 6 months of ADT. Patient will need restaging scans at some point, and it would be reasonable to get an MRI pelvis with contrast before starting  RT. Alternatively, he could get a CT and bone scan after RT. He should also continue getting serial PSA and chromogranin checks given the path findings.     RTC 1 year to follow-up.     BILLIN.      Abelardo Hoang M.D.  . Professor of Medicine  Genitourinary Oncology  Division of Hematology, Oncology & Transplantation  [facility]

## 2019-12-17 NOTE — NURSING NOTE
"Oncology Rooming Note    December 17, 2019 4:41 PM   Zander Stallworth is a 64 year old male who presents for:    Chief Complaint   Patient presents with     Blood Draw     labs drawn via vpt by rn. vs taken      Oncology Clinic Visit     [facility] RETURN- PROSTATE CA     Initial Vitals: /68 (BP Location: Right arm, Patient Position: Sitting, Cuff Size: Adult Regular)   Pulse 60   Temp 97.5  F (36.4  C) (Oral)   Resp 16   Ht 1.778 m (5' 10\")   Wt 84.3 kg (185 lb 12.8 oz)   SpO2 100%   BMI 26.66 kg/m   Estimated body mass index is 26.66 kg/m  as calculated from the following:    Height as of this encounter: 1.778 m (5' 10\").    Weight as of this encounter: 84.3 kg (185 lb 12.8 oz). Body surface area is 2.04 meters squared.  No Pain (0) Comment: Data Unavailable   No LMP for male patient.  Allergies reviewed: Yes  Medications reviewed: Yes    Medications: Medication refills not needed today.  Pharmacy name entered into [facility]: [id number] [place] [address]    Clinical concerns: No new concerns. MALLORY Hoang was notified.      Segundo Power LPN            "

## 2019-12-18 NOTE — NURSING NOTE
Lupron injection given in Right Ventral Gluteal without complication.  Patient tolerated well and was discharged.    Kimberly Martinez CMA ([facility])

## 2019-12-18 NOTE — PROGRESS NOTES
"MEDICAL ONCOLOGY CLINIC NOTE    REFERRING PROVIDER: Dr. Ronquillo ([facility])  RADIATION ONCOLOGIST: Doug Schreiber MD    REASON FOR CURRENT VISIT: Recurrent prostate cancer    HISTORY OF PRESENT ILLNESS: Mr. Zander Stallworth is a 63 year old gentleman who was originally referred by Dr. Ronquillo of Urology for evaluation of rising PSA and concern for metastatic prostate cancer. He was diagnosed with prostate cancer initially in 2017 after an abnormal FIDE. TRUS with biopsy revealed Hallie 5+3=8 prostate adenocarcinoma in 9 of 12 cores (invovling 60% of the tissue), with perineural invasion. PSA was 2.7 at that time. CT and bone scan showed no evidence of metastatic disease. He underwent radical prostatectomy on 8/7/17, pathology consistent with Man 3+4, [id number]. PSA was undetectable after surgery, until February 2019 when PSA was found to be 0.1, up to 0.2 on 8/29/19 and stable at 0.2 on 9/30/19. PET/CT on 9/20/19 revealed findings suspicious for metastatic prostate cancer involving hypermetabolic lymph nodes measuring 0.8 x 0.6 cm in the right internal iliac chain along the right pelvic sidewall and 0.5 x 0.4 cm in the right common iliac chain just anterior to the L5 vertebral body. He was seen by Dr. Ronquillo in urology on 10/3/19 and discussed treatment options including radiation, hormone therapy and pelvic lymph node dissection.    He then underwent a bilateral robot-assisted laparoscopic pelvic lymph node dissection with Dr. Ronquillo on 11/19/19 and pathology showed - \"A. Lymph nodes, right pelvic, excision: - Seven benign lymph nodes (0/7)  B. Lymph nodes, right iliac, excision: - Six benign lymph nodes (0/6)  C. Lymph nodes, right common iliac, excision: - Two of five lymph nodes positive for metastatic prostatic adenocarcinoma (2/5); please see comment - Largest metastatic deposit: 1.1 cm - Extranodal extension is present - D. Lymph nodes, right deep obturator, excision: - Benign soft tissue - No lymph nodes " "identified E. Lymph nodes, left pelvic, excision: - Three benign lymph nodes (0/3).  COMMENT: Immunohistochemical stains show the tumor is positive for synaptophysin. Chromogranin and CD56 show scattered reactivity. While not confirmatory, these results suggest high grade neuroendocrine differentiation.\" Case was reviewed in  tumor board and recommendation made to give ADT+pelvic RT.    He's doing well since surgery except for occasional sharp pain in right groin. Some urgency with intermittent pain in deep pelvis which is relieved after urination. He has no signs/symptoms of disease progression. No abdominal pain, bowel problems, or urinary complaints.     He is retired and maintains a very active lifestyle, walking up to 5 miles per day. Due to see Dr. Abdoul hernández.     REVIEW OF SYSTEMS:  A full 14 point ROS was negative other than the symptoms noted above in the HPI.    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     Hypercholesterolemia 11/27/2009     Lesion of plantar nerve 04/02/2015     Malabsorption of glucose 11/18/2009     Malignant neoplasm of prostate (H) 06/13/2017     Metatarsalgia 04/02/2015     Presbyopia 11/09/2010     Sensorineural hearing loss, bilateral 06/14/2012     Prostate cancer (H) 11/07/2019     Resolved Ambulatory Problems     Diagnosis Date Noted     No Resolved Ambulatory Problems     Past Medical History:   Diagnosis Date     Allergic rhinitis      DM (diabetes mellitus) (H)      Hearing loss      HLD (hyperlipidemia)      PAST SURGICAL HISTORY:  Past Surgical History:   Procedure Laterality Date     DAVINCI LYMPHADENECTOMY N/A 11/19/2019    Procedure: davinci assisted bilateral pelvic lymph node dissection;  Surgeon: Ousmane Ronquillo MD;  Location: [facility] OR     parotid gland removed  2015    " "parotidectomy      PROSTATE SURGERY  08/07/2017    radical prostatectomy with bilateral pelvic lymph node dissection      SOCIAL HISTORY:   Social History     Tobacco Use     Smoking status: Never Smoker     Smokeless tobacco: Never Used   Substance Use Topics     Alcohol use: Yes     Comment: 10 drinks/week     Drug use: Never     FAMILY HISTORY:  - Father had dementia in his late 60s, as did his paternal grandfather.  - Father had colon cancer and later lung cancer.    ALLERGIES:  Allergies   Allergen Reactions     Amoxicillin-Pot Clavulanate      Itching      Erythromycin      Sildenafil      Visual changes with sildenafil.      CURRENT MEDICATIONS:  Current Outpatient Medications:      fexofenadine (ALLEGRA) 60 MG tablet, Take 60 mg by mouth every morning , Disp: , Rfl:      fluticasone (FLONASE) 50 MCG/ACT nasal spray, Spray 2 sprays into both nostrils daily , Disp: , Rfl:      hydrocortisone (ANUCORT-HC) 25 MG suppository, Place 25 mg rectally 2 times daily PRN, Disp: , Rfl:      metFORMIN (GLUCOPHAGE) 500 MG tablet, Take 500 mg by mouth daily (with breakfast) , Disp: , Rfl:      Multiple Vitamins-Minerals (CENTRUM SILVER PO), Take by mouth every morning , Disp: , Rfl:      SENNA-docusate sodium (SENNA S) 8.6-50 MG tablet, Take 1 tablet by mouth At Bedtime, Disp: 20 tablet, Rfl: 0     simvastatin (ZOCOR) 10 MG tablet, At Bedtime , Disp: , Rfl:      tadalafil (CIALIS) 5 MG tablet, TAKE 4 TABLETS BY MOUTH TWO TIMES PER WEEK AS NEEDED, Disp: , Rfl: 9  No current facility-administered medications for this visit.     PHYSICAL EXAMINATION:  Vital signs: /68 (BP Location: Right arm, Patient Position: Sitting, Cuff Size: Adult Regular)   Pulse 60   Temp 97.5  F (36.4  C) (Oral)   Resp 16   Ht 1.778 m (5' 10\")   Wt 84.3 kg (185 lb 12.8 oz)   SpO2 100%   BMI 26.66 kg/m    ECOG performance status of 0.   GENERAL: Well-nourished healthy-appearing man, seated in chair, no acute distress.   HEENT: No icterus, " no pallor. Moist mucous membranes. Oropharynx is clear.   NECK: Supple, no JVD/LAD.  LUNGS: Clear to ausculation bilaterally, normal work of breathing.   CARDIOVASCULAR: Regular rate and rhythm, no murmurs, gallops or rubs.   ABDOMEN: Soft, nontender and nondistended, no palpable masses, bowel sounds present.  EXTREMITIES: No cyanosis, no clubbing, no edema.   NEUROLOGIC: Alert and oriented. No focal deficits.     LABORATORY DATA:   Lab Test 12/17/19  1558 11/11/19  1228   WBC 8.3 5.8   RBC 4.58 4.79   HGB 13.6 14.2   HCT 41.2 43.2   MCV 90 90   MCH 29.7 29.6   MCHC 33.0 32.9   RDW 12.7 12.5    164   NEUTROPHIL 71.4  --     139   POTASSIUM 3.9 4.4   CHLORIDE 107 104   CO2 29 30   ANIONGAP 5 4   GLC 98 100*   BUN 13 16   CR 0.78 0.80   JERROD 8.9 8.9   PROTTOTAL 7.4  --    ALBUMIN 4.2  --    BILITOTAL 0.6  --    ALKPHOS 76  --    AST 17  --    ALT 33  --      Lab Test 12/17/19  1558   PSA 0.34   CGAB - Chromogranin A; TESTOSTTOTAL: Total testosterone.    IMAGING STUDIES:  Reviewed Axumin PET Scan from 9/20/19:  - Notable only for two fluciclovine-avid lymph nodes measuring 0.8 x 0.6 cm in the right internal iliac chain along the right pelvic side wall and 0.5 x 0.4 cm in the right common iliac chain just anterior to the L5 vertebral body.  - Findings suspicious for recurrent prostate cancer involving the pelvic lymph nodes.    ASSESSMENT AND PLAN: Mr. Stallworth is a 64 year old man with locally recurrent prostate cancer, here for a follow-up. He is very well informed and has done much research on his own regarding this topic. He is particularly worried about risk of dementia with hormonal therapy.    We had a long discussion with the patient and his wife regarding his initial diagnosis of intermediate risk prostate cancer ([place] 3+4), disease course following radical prostatectomy, and the implications of pathology findings from recent surgery.     We suspect this is a regional recurrence of localized  prostate cancer rather than truly metastatic disease. However, the finding of neuroendocrine features is highly concerning for either transformation or more likely unchecked growth of a pre-existing poorly-differentiated or neuroendocrine clone. At any rate, it is important that the patient receive additional therapy ASAP due to extracapsular extension of the federica met.     Had a full discussion around what this finding means. There's no clear standard of care for patients in his situation as this carcinoma is unlikely to respond to a more intensive antiandrogen regimen such as abiraterone+ADT. On the other hand, chemoRT similar to that for SCLC is not a proven option either and it does come with a significant risk of adverse events.     I recommended that we start him on ADT at the minimum and add pelvic RT in the coming weeks as recommended by Dr. Schreiber. Discussed side effects of ADT including injection site reactions (bleeding/pain/swelling/injury to surrounding structures), allergic reactions, hot flashes, mood changes, increased risk of CV issues, bone diseases, and possible cognitive dysfunction. The latter are more likely with longer duration ADT.    He's willing to start ADT today. Will give Lupron 22.5mg every 3 monthly dose today. All future doses to be given by Dr. Schreiber, who the patient is slated to see on 19. I also strongly advised him to not wait until 2020 to start RT as he clearly does have residual pelvic disease. He's willing to start RT as soon as end of 2020.    I expect that Dr. Schreiber will complete RT and at least 6 months of ADT. Patient will need restaging scans at some point, and it would be reasonable to get an MRI pelvis with contrast before starting RT. Alternatively, he could get a CT and bone scan after RT. He should also continue getting serial PSA and chromogranin checks given the path findings.     RTC 1 year to follow-up.     BILLIN.      Ableardo Hoang M.D.  Asst.  Professor of Medicine  Genitourinary Oncology  Division of Hematology, Oncology & Transplantation  [facility]

## 2019-12-19 LAB — TESTOST SERPL-MCNC: 275 NG/DL (ref 240–950)

## 2019-12-20 ENCOUNTER — TELEPHONE (OUTPATIENT)
Dept: RADIATION ONCOLOGY | Facility: CLINIC | Age: 64
End: 2019-12-20

## 2019-12-20 LAB — CGA SERPL-MCNC: 52 NG/ML (ref 0–160)

## 2019-12-20 NOTE — TELEPHONE ENCOUNTER
RN called pt and spoke to him about having a full bladder and empty rectum for his simulation scheduled 12/27/19. Pt verbalizes understanding.

## 2019-12-24 NOTE — PROGRESS NOTES
RADIATION ONCOLOGY FOLLOW UP  DATE:  December 27, 2019    PATIENT NAME: Zander Stallworth  MEDICAL RECORD NUMBER: [id number]  REFERRING PHYSICIAN:  Dr. Ronquillo      HISTORY OF PRESENT ILLNESS: Mr. Zander Stallworth is a 63-year-old gentleman with a diagnosis of recurrent prostate cancer. He initially was found to have a prostate nodule on routine physical exam in May 2017 .His PSA at that time was 2.7.  He underwent a TRUS guided biopsy on 5/31/2017. The pathology ([id number]) showed adenocarcinoma involving 9/12 cores. Mobile's score 8 (5+3) was involving the right middle core biopsy, Hallie's score 7(4+3) was involving the right apex core biopsy, Hallie's score 7 (3+4) was involving right lateral base, left base, and left middle core biopsies, while Mobile's score 6 (3+3) was involving the left apex, left middle lateral, left base lateral, and right base core biopsies. He had a staging CT abdomen pelvis and bone scan which showed no evidence of metastatic disease.     The patient then underwent robotic assisted radical prostatectomy with bilateral pelvic lymph node dissection on 8/7/2017. The surgical pathology showed adenocarcinoma, Hallie 3+4=7,  acinar type, the tumor was involving bilateral lobes. There was no evidence of extraprostatic extension, seminal vesicle invasion or positive surgical margins. 0/3 dissected nodes were positive for carcinoma. The final stage was [id number].     His postoperative PSA remained undetectable until it was 0.1 on 2/15/2019 and mariano up to 0.2 on 8/29/2019. The patient then underwent an Axumin PET/CT on 9/20/2019 which showed hypermetabolic right internal iliac node measuring 0.8 x 0.6 cm as well as a hypermetabolic right common iliac node measuring 0.5 x 0.4 cm suspicious for metastatic disease.     The patient underwent surgery to remove right pelvic nodes found on the PET/CT scan on 11/19/2019. The pathology showed 7 right pelvic nodes, 3 left pelvic LNs, right deep obturator LNs and 6  right iliac nodes that were benign. There were 2/5 lymph nodes from right common iliac lymph nodes with metastases(1.1 cm deposit).  Immunohistochemical stains show the tumor is positive for synaptophysin with Chromogranin and CD56 show scattered  reactivity.  These results suggested high grade  neuroendocrine differentiation. The original prostate pathology of the  biopsy and prostatectomy were reviewed and it showed acinar type adenocarcinomas.     A post op PSA on 12/17/2019 was 0.34. The Chromogranin A level was 52.     The patient received 22.5 mg of Lupron on 12/17/2019. The patient presented to [facility] for a simulation      IMPRESSION: Recurrent prostate cancer with 2/5 right common iliac LNs involvement.    RECOMMENDATION: The patient signed a consent for radiotherapy after reviewing the intent and toxicities related to radiotherapy.      ABRAHAM Schreiber M.D.  Department of Radiation Oncology  [facility]

## 2019-12-27 ENCOUNTER — ALLIED HEALTH/NURSE VISIT (OUTPATIENT)
Dept: RADIATION ONCOLOGY | Facility: CLINIC | Age: 64
End: 2019-12-27
Attending: RADIOLOGY
Payer: MEDICARE

## 2019-12-27 DIAGNOSIS — C61 MALIGNANT NEOPLASM OF PROSTATE (H): Primary | ICD-10-CM

## 2019-12-27 PROCEDURE — 77334 RADIATION TREATMENT AID(S): CPT | Performed by: RADIOLOGY

## 2019-12-27 NOTE — PROGRESS NOTES
Radiation Therapy Patient Education    Person involved with teaching: Patient and Wife    Patient educational needs for self management of treatment-related side effects assessment completed.  [facility] Patient Ed tab contains Patient Learning Assessment    Education Materials Given  Schedule and department information.    Educational Topics Discussed  Schedule, and verified that Physician spoke to pt about possible side effects.  Pt reports understanding to arrive with full bladder to treatment.    Response To Teaching  Verbalizes understanding    Referrals sent: None    Chemotherapy?  No

## 2019-12-27 NOTE — PROGRESS NOTES
Simulation Note    Date: December 27, 2019    Patient: Zander Stallworth    Diagnosis: Malignant neoplasm of prostate (H)    Type of Simulation:  Salvage     Patient Position: Supine    Patient Immobilization: Custom:  Vac-Loc    Simulation Aid(s): Urethrogram    Image Acquisition: Conventional CT simulation without 4D    Total Dose Planned: 7000 cGy      Dose/fraction:200 cGy    Energy of machine:  10 MV           Type of Radiotherapy Technique: IMRT(Intensity Modulated Radiotherapy)      Continuing Physcis/Dosimetry  and Dose calculations are ordered.  Simple simulations will be done prior to new start and changes in fields.  Weekly on treat visit.    The patient will be on vacation until end of 1/2020. He will start radiotherapy on 2/3/2020.       ABRAHAM Schreiber M.D.  Department of Radiation Oncology  [facility]

## 2019-12-27 NOTE — LETTER
12/27/2019       RE: Zander Stallworth  [address]  [place] [address]     Dear Colleague,    Thank you for referring your patient, Zander Stallworth, to the RADIATION ONCOLOGY CLINIC. Please see a copy of my visit note below.    RADIATION ONCOLOGY FOLLOW UP  DATE:  December 27, 2019    PATIENT NAME: Zander Stallworth  MEDICAL RECORD NUMBER: [id number]  REFERRING PHYSICIAN:  Dr. Ronquillo      HISTORY OF PRESENT ILLNESS: Mr. Zander Stallworth is a 63-year-old gentleman with a diagnosis of recurrent prostate cancer. He initially was found to have a prostate nodule on routine physical exam in May 2017 .His PSA at that time was 2.7.  He underwent a TRUS guided biopsy on 5/31/2017. The pathology ([id number]) showed adenocarcinoma involving 9/12 cores. Linden's score 8 (5+3) was involving the right middle core biopsy, Hallie's score 7(4+3) was involving the right apex core biopsy, Linden's score 7 (3+4) was involving right lateral base, left base, and left middle core biopsies, while Linden's score 6 (3+3) was involving the left apex, left middle lateral, left base lateral, and right base core biopsies. He had a staging CT abdomen pelvis and bone scan which showed no evidence of metastatic disease.     The patient then underwent robotic assisted radical prostatectomy with bilateral pelvic lymph node dissection on 8/7/2017. The surgical pathology showed adenocarcinoma, Linden 3+4=7,  acinar type, the tumor was involving bilateral lobes. There was no evidence of extraprostatic extension, seminal vesicle invasion or positive surgical margins. 0/3 dissected nodes were positive for carcinoma. The final stage was [id number].     His postoperative PSA remained undetectable until it was 0.1 on 2/15/2019 and mariano up to 0.2 on 8/29/2019. The patient then underwent an Axumin PET/CT on 9/20/2019 which showed hypermetabolic right internal iliac node measuring 0.8 x 0.6 cm as well as a hypermetabolic right common iliac node measuring 0.5 x 0.4 cm  suspicious for metastatic disease.     The patient underwent surgery to remove right pelvic nodes found on the PET/CT scan on 11/19/2019. The pathology showed 7 right pelvic nodes, 3 left pelvic LNs, right deep obturator LNs and 6 right iliac nodes that were benign. There were 2/5 lymph nodes from right common iliac lymph nodes with metastases(1.1 cm deposit).  Immunohistochemical stains show the tumor is positive for synaptophysin with Chromogranin and CD56 show scattered  reactivity.  These results suggested high grade  neuroendocrine differentiation. The original prostate pathology of the  biopsy and prostatectomy were reviewed and it showed acinar type adenocarcinomas.     A post op PSA on 12/17/2019 was 0.34. The Chromogranin A level was 52.     The patient received 22.5 mg of Lupron on 12/17/2019. The patient presented to [facility] for a simulation      IMPRESSION: Recurrent prostate cancer with 2/5 right common iliac LNs involvement.    RECOMMENDATION: The patient signed a consent for radiotherapy after reviewing the intent and toxicities related to radiotherapy.      ABRAHAM Schreiber M.D.  Department of Radiation Oncology  [facility]         Again, thank you for allowing me to participate in the care of your patient.      Sincerely,    Doug Schreiber MD

## 2020-01-31 ENCOUNTER — APPOINTMENT (OUTPATIENT)
Dept: RADIATION ONCOLOGY | Facility: CLINIC | Age: 65
End: 2020-01-31
Attending: RADIOLOGY
Payer: MEDICARE

## 2020-01-31 PROCEDURE — 77387 GUIDANCE FOR RADJ TX DLVR: CPT | Performed by: RADIOLOGY

## 2020-02-03 ENCOUNTER — APPOINTMENT (OUTPATIENT)
Dept: RADIATION ONCOLOGY | Facility: CLINIC | Age: 65
End: 2020-02-03
Attending: RADIOLOGY
Payer: MEDICARE

## 2020-02-03 PROCEDURE — 77385 ZZH IMRT TREATMENT DELIVERY, SIMPLE: CPT | Performed by: RADIOLOGY

## 2020-02-04 ENCOUNTER — APPOINTMENT (OUTPATIENT)
Dept: RADIATION ONCOLOGY | Facility: CLINIC | Age: 65
End: 2020-02-04
Attending: RADIOLOGY
Payer: MEDICARE

## 2020-02-04 PROCEDURE — 77385 ZZH IMRT TREATMENT DELIVERY, SIMPLE: CPT | Performed by: RADIOLOGY

## 2020-02-05 ENCOUNTER — APPOINTMENT (OUTPATIENT)
Dept: RADIATION ONCOLOGY | Facility: CLINIC | Age: 65
End: 2020-02-05
Attending: RADIOLOGY
Payer: MEDICARE

## 2020-02-05 PROCEDURE — 77385 ZZH IMRT TREATMENT DELIVERY, SIMPLE: CPT | Performed by: RADIOLOGY

## 2020-02-06 ENCOUNTER — OFFICE VISIT (OUTPATIENT)
Dept: RADIATION ONCOLOGY | Facility: CLINIC | Age: 65
End: 2020-02-06
Attending: RADIOLOGY
Payer: MEDICARE

## 2020-02-06 VITALS
DIASTOLIC BLOOD PRESSURE: 68 MMHG | HEART RATE: 66 BPM | BODY MASS INDEX: 27.81 KG/M2 | SYSTOLIC BLOOD PRESSURE: 110 MMHG | WEIGHT: 193.8 LBS

## 2020-02-06 DIAGNOSIS — C61 MALIGNANT NEOPLASM OF PROSTATE (H): Primary | ICD-10-CM

## 2020-02-06 PROCEDURE — 77385 ZZH IMRT TREATMENT DELIVERY, SIMPLE: CPT | Performed by: RADIOLOGY

## 2020-02-06 NOTE — LETTER
2/6/2020     RE: Zander Stallworth  [address]  [place] [address]     Dear Colleague,    Thank you for referring your patient, Zander Stallworth, to the RADIATION ONCOLOGY CLINIC. Please see a copy of my visit note below.    WEEKLY MANAGEMENT NOTE  Radiation Oncology          Patient Name: Zander Stallworth  MRN: [id number]       Pelvis Current Dose: 800/7000 cGy Fractions: 4/35          DAILY DOSE:     200  cGy/ day,  5 times/week      DISEASE UNDER TREATMENT: Recurrent Adenocarcinoma of Prostate  after surgery(8/7/2017). 2/5 right common iliac LN+ on LN surgery after Axumin PET findings.    SUBJECTIVE:    CTC V5.0 Toxicity Criteria  Fatigue: Grade 0: No toxicity  Pain Score: 0 /10     :   Urinary Frequency/Urgency: Grade 0: No change from baseline  Urinary Incontinence: Grade 0: No change from baseline  Dysuria:Grade 0: No change from baseline  Nocturia: 1  Comment:    GI:  Diarrhea:Grade 0  No change over baseline  Proctitis: Grade 0 No symptom  Comment:      OBJECTIVE:  Wt Readings from Last 2 Encounters:   02/06/20 87.9 kg (193 lb 12.8 oz)   12/17/19 84.3 kg (185 lb 12.8 oz)         IMPRESSION: The patient is tolerating the treatment.  The patient set up, dose, and cone beam CT images were reviewed.    PLAN: Continue radiotherapy    PAIN MANAGEMENT PLAN: The patient does not require pain management    ABRAHAM Schreiber M.D.  Department of Radiation Oncology  [facility]

## 2020-02-06 NOTE — PROGRESS NOTES
WEEKLY MANAGEMENT NOTE  Radiation Oncology          Patient Name: Zander Stallworth  MRN: [id number]       Pelvis Current Dose: 800/7000 cGy Fractions: 4/35          DAILY DOSE:     200  cGy/ day,  5 times/week      DISEASE UNDER TREATMENT: Recurrent Adenocarcinoma of Prostate  after surgery(8/7/2017). 2/5 right common iliac LN+ on LN surgery after Axumin PET findings.    SUBJECTIVE:    CTC V5.0 Toxicity Criteria  Fatigue: Grade 0: No toxicity  Pain Score: 0 /10     :   Urinary Frequency/Urgency: Grade 0: No change from baseline  Urinary Incontinence: Grade 0: No change from baseline  Dysuria:Grade 0: No change from baseline  Nocturia: 1  Comment:    GI:  Diarrhea:Grade 0  No change over baseline  Proctitis: Grade 0 No symptom  Comment:      OBJECTIVE:  Wt Readings from Last 2 Encounters:   02/06/20 87.9 kg (193 lb 12.8 oz)   12/17/19 84.3 kg (185 lb 12.8 oz)         IMPRESSION: The patient is tolerating the treatment.  The patient set up, dose, and cone beam CT images were reviewed.      PLAN: Continue radiotherapy    PAIN MANAGEMENT PLAN: The patient does not require pain management    ABRAHAM Schreiber M.D.  Department of Radiation Oncology  [facility]

## 2020-02-07 ENCOUNTER — APPOINTMENT (OUTPATIENT)
Dept: RADIATION ONCOLOGY | Facility: CLINIC | Age: 65
End: 2020-02-07
Attending: RADIOLOGY
Payer: MEDICARE

## 2020-02-07 PROCEDURE — 77385 ZZH IMRT TREATMENT DELIVERY, SIMPLE: CPT | Performed by: RADIOLOGY

## 2020-02-07 PROCEDURE — 77336 RADIATION PHYSICS CONSULT: CPT | Performed by: RADIOLOGY

## 2020-02-10 ENCOUNTER — APPOINTMENT (OUTPATIENT)
Dept: RADIATION ONCOLOGY | Facility: CLINIC | Age: 65
End: 2020-02-10
Attending: RADIOLOGY
Payer: MEDICARE

## 2020-02-10 PROCEDURE — 77385 ZZH IMRT TREATMENT DELIVERY, SIMPLE: CPT | Performed by: RADIOLOGY

## 2020-02-11 ENCOUNTER — APPOINTMENT (OUTPATIENT)
Dept: RADIATION ONCOLOGY | Facility: CLINIC | Age: 65
End: 2020-02-11
Attending: RADIOLOGY
Payer: MEDICARE

## 2020-02-11 PROCEDURE — 77385 ZZH IMRT TREATMENT DELIVERY, SIMPLE: CPT | Performed by: RADIOLOGY

## 2020-02-12 ENCOUNTER — APPOINTMENT (OUTPATIENT)
Dept: RADIATION ONCOLOGY | Facility: CLINIC | Age: 65
End: 2020-02-12
Attending: RADIOLOGY
Payer: MEDICARE

## 2020-02-12 PROCEDURE — 77385 ZZH IMRT TREATMENT DELIVERY, SIMPLE: CPT | Performed by: RADIOLOGY

## 2020-02-13 ENCOUNTER — OFFICE VISIT (OUTPATIENT)
Dept: RADIATION ONCOLOGY | Facility: CLINIC | Age: 65
End: 2020-02-13
Attending: RADIOLOGY
Payer: MEDICARE

## 2020-02-13 VITALS
HEART RATE: 94 BPM | BODY MASS INDEX: 27.71 KG/M2 | WEIGHT: 193.1 LBS | SYSTOLIC BLOOD PRESSURE: 108 MMHG | DIASTOLIC BLOOD PRESSURE: 66 MMHG

## 2020-02-13 DIAGNOSIS — C61 MALIGNANT NEOPLASM OF PROSTATE (H): ICD-10-CM

## 2020-02-13 PROCEDURE — 77385 ZZH IMRT TREATMENT DELIVERY, SIMPLE: CPT | Performed by: RADIOLOGY

## 2020-02-13 NOTE — LETTER
2/13/2020       RE: Zander Stallworth  [address]  [place] [address]     Dear Colleague,    Thank you for referring your patient, Zander Stallworth, to the RADIATION ONCOLOGY CLINIC. Please see a copy of my visit note below.    WEEKLY MANAGEMENT NOTE  Radiation Oncology          Patient Name: Zander Stallworth  MRN: [id number]       Pelvis Current Dose: 800/7000 cGy Fractions: 4/35          DAILY DOSE:     200  cGy/ day,  5 times/week      DISEASE UNDER TREATMENT: Recurrent Adenocarcinoma of Prostate  after surgery(8/7/2017). 2/5 right common iliac LN+ on LN surgery after Axumin PET findings.    ADT: Lupron 22.5 mg  (12/17/2019)    SUBJECTIVE:    CTC V5.0 Toxicity Criteria  Fatigue: Grade 0: No toxicity  Pain Score: 0 /10     :   Urinary Frequency/Urgency: Grade 0: No change from baseline  Urinary Incontinence: Grade 0: No change from baseline  Dysuria:Grade 0: No change from baseline  Nocturia: 1  Comment:    GI:  Diarrhea:Grade 0  No change over baseline  Proctitis: Grade 0 No symptom  Comment:      OBJECTIVE:  Wt Readings from Last 2 Encounters:   02/13/20 87.6 kg (193 lb 1.6 oz)   02/06/20 87.9 kg (193 lb 12.8 oz)         IMPRESSION: The patient is tolerating the treatment.  The patient set up, dose, and cone beam CT images were reviewed.      PLAN: Continue radiotherapy.  Lupron for minimum of 24 M but likely indefinitely.    PAIN MANAGEMENT PLAN: The patient does not require pain management    ABRAHAM Schreiber M.D.  Department of Radiation Oncology  [facility]

## 2020-02-13 NOTE — PROGRESS NOTES
WEEKLY MANAGEMENT NOTE  Radiation Oncology          Patient Name: Zander Stallworth  MRN: [id number]       Pelvis Current Dose: 800/7000 cGy Fractions: 4/35          DAILY DOSE:     200  cGy/ day,  5 times/week      DISEASE UNDER TREATMENT: Recurrent Adenocarcinoma of Prostate  after surgery(8/7/2017). 2/5 right common iliac LN+ on LN surgery after Axumin PET findings.    ADT: Lupron 22.5 mg  (12/17/2019)    SUBJECTIVE:    CTC V5.0 Toxicity Criteria  Fatigue: Grade 0: No toxicity  Pain Score: 0 /10     :   Urinary Frequency/Urgency: Grade 0: No change from baseline  Urinary Incontinence: Grade 0: No change from baseline  Dysuria:Grade 0: No change from baseline  Nocturia: 1  Comment:    GI:  Diarrhea:Grade 0  No change over baseline  Proctitis: Grade 0 No symptom  Comment:      OBJECTIVE:  Wt Readings from Last 2 Encounters:   02/13/20 87.6 kg (193 lb 1.6 oz)   02/06/20 87.9 kg (193 lb 12.8 oz)         IMPRESSION: The patient is tolerating the treatment.  The patient set up, dose, and cone beam CT images were reviewed.      PLAN: Continue radiotherapy.  Lupron for minimum of 24 M but likely indefinitely.    PAIN MANAGEMENT PLAN: The patient does not require pain management    ABRAHAM Schreiber M.D.  Department of Radiation Oncology  [facility]

## 2020-02-14 ENCOUNTER — APPOINTMENT (OUTPATIENT)
Dept: RADIATION ONCOLOGY | Facility: CLINIC | Age: 65
End: 2020-02-14
Attending: RADIOLOGY
Payer: MEDICARE

## 2020-02-14 PROCEDURE — 77336 RADIATION PHYSICS CONSULT: CPT | Performed by: RADIOLOGY

## 2020-02-14 PROCEDURE — 77385 ZZH IMRT TREATMENT DELIVERY, SIMPLE: CPT | Performed by: RADIOLOGY

## 2020-02-17 ENCOUNTER — APPOINTMENT (OUTPATIENT)
Dept: RADIATION ONCOLOGY | Facility: CLINIC | Age: 65
End: 2020-02-17
Attending: RADIOLOGY
Payer: MEDICARE

## 2020-02-17 PROCEDURE — 77385 ZZH IMRT TREATMENT DELIVERY, SIMPLE: CPT | Performed by: RADIOLOGY

## 2020-02-18 ENCOUNTER — APPOINTMENT (OUTPATIENT)
Dept: RADIATION ONCOLOGY | Facility: CLINIC | Age: 65
End: 2020-02-18
Attending: RADIOLOGY
Payer: MEDICARE

## 2020-02-18 PROCEDURE — 77385 ZZH IMRT TREATMENT DELIVERY, SIMPLE: CPT | Performed by: RADIOLOGY

## 2020-02-19 ENCOUNTER — APPOINTMENT (OUTPATIENT)
Dept: RADIATION ONCOLOGY | Facility: CLINIC | Age: 65
End: 2020-02-19
Attending: RADIOLOGY
Payer: MEDICARE

## 2020-02-19 PROCEDURE — 77385 ZZH IMRT TREATMENT DELIVERY, SIMPLE: CPT | Performed by: RADIOLOGY

## 2020-02-20 ENCOUNTER — APPOINTMENT (OUTPATIENT)
Dept: RADIATION ONCOLOGY | Facility: CLINIC | Age: 65
End: 2020-02-20
Attending: RADIOLOGY
Payer: MEDICARE

## 2020-02-20 VITALS
BODY MASS INDEX: 27.55 KG/M2 | WEIGHT: 192 LBS | SYSTOLIC BLOOD PRESSURE: 133 MMHG | HEART RATE: 87 BPM | DIASTOLIC BLOOD PRESSURE: 71 MMHG

## 2020-02-20 DIAGNOSIS — C61 MALIGNANT NEOPLASM OF PROSTATE (H): Primary | ICD-10-CM

## 2020-02-20 PROCEDURE — 77385 ZZH IMRT TREATMENT DELIVERY, SIMPLE: CPT | Performed by: RADIOLOGY

## 2020-02-20 NOTE — PROGRESS NOTES
WEEKLY MANAGEMENT NOTE  Radiation Oncology          Patient Name: Zander Stallworth  MRN: [id number]       Pelvis Current Dose: 2800/7000 cGy Fractions: 14/35          DAILY DOSE:     200  cGy/ day,  5 times/week      DISEASE UNDER TREATMENT: Recurrent Adenocarcinoma of Prostate  after surgery(8/7/2017). 2/5 right common iliac LN+ on LN surgery after Axumin PET findings.    ADT: Lupron 22.5 mg  (12/17/2019)    SUBJECTIVE:    CTC V5.0 Toxicity Criteria  Fatigue: Grade 0: No toxicity  Pain Score: 0 /10     :   Urinary Frequency/Urgency: Grade 1: Increase in frequency or nocturia up to 2X normal  Urinary Incontinence: Grade 0: No change from baseline  Dysuria:Grade 0: No change from baseline  Nocturia: 1  Comment:The patient has more urgency    GI:  Diarrhea:Grade 0  No change over baseline  Proctitis: Grade 0 No symptom  Comment:      OBJECTIVE:  Wt Readings from Last 2 Encounters:   02/20/20 87.1 kg (192 lb)   02/13/20 87.6 kg (193 lb 1.6 oz)         IMPRESSION: The patient is tolerating the treatment.  The patient set up, dose, and cone beam CT images were reviewed.      PLAN: Continue radiotherapy.  Lupron for minimum of 24 M but likely indefinitely. We discussed possible use of Flomax if the  symptoms get worse.    PAIN MANAGEMENT PLAN: The patient does not require pain management    ABRAHAM Schreiber M.D.  Department of Radiation Oncology  [facility]

## 2020-02-20 NOTE — LETTER
2/20/2020       RE: Zander Stallworth  [address]  [place] [address]     Dear Colleague,    Thank you for referring your patient, Zander Stallworth, to the RADIATION ONCOLOGY CLINIC. Please see a copy of my visit note below.    WEEKLY MANAGEMENT NOTE  Radiation Oncology          Patient Name: Zander Stallworth  MRN: [id number]       Pelvis Current Dose: 2800/7000 cGy Fractions: 14/35          DAILY DOSE:     200  cGy/ day,  5 times/week      DISEASE UNDER TREATMENT: Recurrent Adenocarcinoma of Prostate  after surgery(8/7/2017). 2/5 right common iliac LN+ on LN surgery after Axumin PET findings.    ADT: Lupron 22.5 mg  (12/17/2019)    SUBJECTIVE:    CTC V5.0 Toxicity Criteria  Fatigue: Grade 0: No toxicity  Pain Score: 0 /10     :   Urinary Frequency/Urgency: Grade 1: Increase in frequency or nocturia up to 2X normal  Urinary Incontinence: Grade 0: No change from baseline  Dysuria:Grade 0: No change from baseline  Nocturia: 1  Comment:The patient has more urgency    GI:  Diarrhea:Grade 0  No change over baseline  Proctitis: Grade 0 No symptom  Comment:      OBJECTIVE:  Wt Readings from Last 2 Encounters:   02/20/20 87.1 kg (192 lb)   02/13/20 87.6 kg (193 lb 1.6 oz)         IMPRESSION: The patient is tolerating the treatment.  The patient set up, dose, and cone beam CT images were reviewed.      PLAN: Continue radiotherapy.  Lupron for minimum of 24 M but likely indefinitely. We discussed possible use of Flomax if the  symptoms get worse.    PAIN MANAGEMENT PLAN: The patient does not require pain management    ABRAHAM Schreiber M.D.  Department of Radiation Oncology  [facility]

## 2020-02-21 ENCOUNTER — APPOINTMENT (OUTPATIENT)
Dept: RADIATION ONCOLOGY | Facility: CLINIC | Age: 65
End: 2020-02-21
Attending: RADIOLOGY
Payer: MEDICARE

## 2020-02-21 PROCEDURE — 77336 RADIATION PHYSICS CONSULT: CPT | Performed by: RADIOLOGY

## 2020-02-21 PROCEDURE — 77385 ZZH IMRT TREATMENT DELIVERY, SIMPLE: CPT | Performed by: RADIOLOGY

## 2020-02-24 ENCOUNTER — APPOINTMENT (OUTPATIENT)
Dept: RADIATION ONCOLOGY | Facility: CLINIC | Age: 65
End: 2020-02-24
Attending: RADIOLOGY
Payer: MEDICARE

## 2020-02-24 PROCEDURE — 77385 ZZH IMRT TREATMENT DELIVERY, SIMPLE: CPT | Performed by: RADIOLOGY

## 2020-02-25 ENCOUNTER — APPOINTMENT (OUTPATIENT)
Dept: RADIATION ONCOLOGY | Facility: CLINIC | Age: 65
End: 2020-02-25
Attending: RADIOLOGY
Payer: MEDICARE

## 2020-02-25 PROCEDURE — 77385 ZZH IMRT TREATMENT DELIVERY, SIMPLE: CPT | Performed by: RADIOLOGY

## 2020-02-26 ENCOUNTER — APPOINTMENT (OUTPATIENT)
Dept: RADIATION ONCOLOGY | Facility: CLINIC | Age: 65
End: 2020-02-26
Attending: RADIOLOGY
Payer: MEDICARE

## 2020-02-26 PROCEDURE — 77385 ZZH IMRT TREATMENT DELIVERY, SIMPLE: CPT | Performed by: RADIOLOGY

## 2020-02-27 ENCOUNTER — OFFICE VISIT (OUTPATIENT)
Dept: RADIATION ONCOLOGY | Facility: CLINIC | Age: 65
End: 2020-02-27
Attending: RADIOLOGY
Payer: MEDICARE

## 2020-02-27 VITALS
WEIGHT: 195 LBS | HEART RATE: 80 BPM | SYSTOLIC BLOOD PRESSURE: 102 MMHG | DIASTOLIC BLOOD PRESSURE: 62 MMHG | BODY MASS INDEX: 27.98 KG/M2

## 2020-02-27 DIAGNOSIS — C61 MALIGNANT NEOPLASM OF PROSTATE (H): Primary | ICD-10-CM

## 2020-02-27 PROCEDURE — 77385 ZZH IMRT TREATMENT DELIVERY, SIMPLE: CPT | Performed by: RADIOLOGY

## 2020-02-27 NOTE — PROGRESS NOTES
.  WEEKLY MANAGEMENT NOTE  Radiation Oncology          Patient Name: Zander Stallworth  MRN: [id number]       Pelvis Current Dose: 3800/7000 cGy Fractions: 17/35          DAILY DOSE:     200  cGy/ day,  5 times/week      DISEASE UNDER TREATMENT: Recurrent Adenocarcinoma of Prostate  after surgery(8/7/2017). 2/5 right common iliac LN+ on LN surgery after Axumin PET findings.    ADT: Lupron 22.5 mg  (12/17/2019)    SUBJECTIVE:    CTC V5.0 Toxicity Criteria  Fatigue: Grade 0: No toxicity  Pain Score: 0 /10     :   Urinary Frequency/Urgency: Grade 1: Increase in frequency or nocturia up to 2X normal  Urinary Incontinence: Grade 0: No change from baseline  Dysuria:Grade 0: No change from baseline  Nocturia: 2(baseline 1)  Comment:The patient has more urgency    GI:  Diarrhea:Grade 1  Increase of <4 stools/day over baseline  Proctitis: Grade 0 No symptom  Comment:      OBJECTIVE:  Wt Readings from Last 2 Encounters:   02/27/20 88.5 kg (195 lb)   02/20/20 87.1 kg (192 lb)         IMPRESSION: The patient is tolerating the treatment.  The patient set up, dose, and cone beam CT images were reviewed.      PLAN: Continue radiotherapy.  Lupron for minimum of 24 M but likely indefinitely. We discussed possible use of Flomax if the  symptoms get worse.    PAIN MANAGEMENT PLAN: The patient does not require pain management    ABRAHAM Schreiber M.D.  Department of Radiation Oncology  [facility]

## 2020-02-27 NOTE — LETTER
2/27/2020       RE: Zander Stallworth  [address]  [place] [address]     Dear Colleague,    Thank you for referring your patient, Zander Stallworth, to the RADIATION ONCOLOGY CLINIC. Please see a copy of my visit note below.    .  WEEKLY MANAGEMENT NOTE  Radiation Oncology          Patient Name: Zander Stallworth  MRN: [id number]       Pelvis Current Dose: 3800/7000 cGy Fractions: 17/35          DAILY DOSE:     200  cGy/ day,  5 times/week      DISEASE UNDER TREATMENT: Recurrent Adenocarcinoma of Prostate  after surgery(8/7/2017). 2/5 right common iliac LN+ on LN surgery after Axumin PET findings.    ADT: Lupron 22.5 mg  (12/17/2019)    SUBJECTIVE:    CTC V5.0 Toxicity Criteria  Fatigue: Grade 0: No toxicity  Pain Score: 0 /10     :   Urinary Frequency/Urgency: Grade 1: Increase in frequency or nocturia up to 2X normal  Urinary Incontinence: Grade 0: No change from baseline  Dysuria:Grade 0: No change from baseline  Nocturia: 2(baseline 1)  Comment:The patient has more urgency    GI:  Diarrhea:Grade 1  Increase of <4 stools/day over baseline  Proctitis: Grade 0 No symptom  Comment:      OBJECTIVE:  Wt Readings from Last 2 Encounters:   02/27/20 88.5 kg (195 lb)   02/20/20 87.1 kg (192 lb)         IMPRESSION: The patient is tolerating the treatment.  The patient set up, dose, and cone beam CT images were reviewed.      PLAN: Continue radiotherapy.  Lupron for minimum of 24 M but likely indefinitely. We discussed possible use of Flomax if the  symptoms get worse.    PAIN MANAGEMENT PLAN: The patient does not require pain management    ABRAHAM Schreiber M.D.  Department of Radiation Oncology  [facility]

## 2020-02-28 ENCOUNTER — APPOINTMENT (OUTPATIENT)
Dept: RADIATION ONCOLOGY | Facility: CLINIC | Age: 65
End: 2020-02-28
Attending: RADIOLOGY
Payer: MEDICARE

## 2020-02-28 PROCEDURE — 77385 ZZH IMRT TREATMENT DELIVERY, SIMPLE: CPT | Performed by: RADIOLOGY

## 2020-02-28 PROCEDURE — 77336 RADIATION PHYSICS CONSULT: CPT | Performed by: RADIOLOGY

## 2020-03-02 ENCOUNTER — APPOINTMENT (OUTPATIENT)
Dept: RADIATION ONCOLOGY | Facility: CLINIC | Age: 65
End: 2020-03-02
Attending: RADIOLOGY
Payer: MEDICARE

## 2020-03-02 PROCEDURE — 77385 ZZH IMRT TREATMENT DELIVERY, SIMPLE: CPT | Performed by: RADIOLOGY

## 2020-03-03 ENCOUNTER — APPOINTMENT (OUTPATIENT)
Dept: RADIATION ONCOLOGY | Facility: CLINIC | Age: 65
End: 2020-03-03
Attending: RADIOLOGY
Payer: MEDICARE

## 2020-03-03 PROCEDURE — 77385 ZZH IMRT TREATMENT DELIVERY, SIMPLE: CPT | Performed by: RADIOLOGY

## 2020-03-04 ENCOUNTER — APPOINTMENT (OUTPATIENT)
Dept: RADIATION ONCOLOGY | Facility: CLINIC | Age: 65
End: 2020-03-04
Attending: RADIOLOGY
Payer: MEDICARE

## 2020-03-04 PROCEDURE — 77385 ZZH IMRT TREATMENT DELIVERY, SIMPLE: CPT | Performed by: RADIOLOGY

## 2020-03-05 ENCOUNTER — OFFICE VISIT (OUTPATIENT)
Dept: RADIATION ONCOLOGY | Facility: CLINIC | Age: 65
End: 2020-03-05
Attending: RADIOLOGY
Payer: MEDICARE

## 2020-03-05 VITALS
HEART RATE: 62 BPM | DIASTOLIC BLOOD PRESSURE: 50 MMHG | BODY MASS INDEX: 27.36 KG/M2 | WEIGHT: 190.7 LBS | SYSTOLIC BLOOD PRESSURE: 109 MMHG

## 2020-03-05 DIAGNOSIS — C61 MALIGNANT NEOPLASM OF PROSTATE (H): Primary | ICD-10-CM

## 2020-03-05 PROCEDURE — 77385 ZZH IMRT TREATMENT DELIVERY, SIMPLE: CPT | Performed by: RADIOLOGY

## 2020-03-05 NOTE — PROGRESS NOTES
.  WEEKLY MANAGEMENT NOTE  Radiation Oncology          Patient Name: Zander Stallworth  MRN: [id number]       Pelvis Current Dose: 4800/7000 cGy Fractions: 24/35          DAILY DOSE:     200  cGy/ day,  5 times/week      DISEASE UNDER TREATMENT: Recurrent Adenocarcinoma of Prostate  after surgery(8/7/2017). 2/5 right common iliac pLN+ on LN surgery(11/19/2020). Preop Axumin PET findings+ for LN met(9/20/2019).    ADT: Lupron 22.5 mg  (12/17/2019)    SUBJECTIVE:    CTC V5.0 Toxicity Criteria  Fatigue: Grade 0: No toxicity  Pain Score: 0 /10     :   Urinary Frequency/Urgency: Grade 1: Increase in frequency or nocturia up to 2X normal  Urinary Incontinence: Grade 0: No change from baseline  Dysuria:Grade 0: No change from baseline  Nocturia: 2(baseline 1)  Comment:    GI:  Diarrhea:Grade 1  Increase of <4 stools/day over baseline  Proctitis: Grade 0 No symptom  Comment:      OBJECTIVE:  Wt Readings from Last 2 Encounters:   03/05/20 86.5 kg (190 lb 11.2 oz)   02/27/20 88.5 kg (195 lb)         IMPRESSION: The patient is tolerating the treatment.  The patient set up, dose, and cone beam CT images were reviewed.      PLAN: Continue radiotherapy.  Lupron for minimum of 24 M but likely indefinitely. We discussed possible use of Flomax if the  symptoms get worse but it was not started today    PAIN MANAGEMENT PLAN: The patient does not require pain management    ABRAHAM Schreiber M.D.  Department of Radiation Oncology  [facility]

## 2020-03-05 NOTE — LETTER
3/5/2020       RE: Zander Stallworth  [address]  [place] [address]     Dear Colleague,    Thank you for referring your patient, Zander Stallworth, to the RADIATION ONCOLOGY CLINIC. Please see a copy of my visit note below.    .  WEEKLY MANAGEMENT NOTE  Radiation Oncology          Patient Name: Zander Stallworth  MRN: [id number]       Pelvis Current Dose: 4800/7000 cGy Fractions: 24/35          DAILY DOSE:     200  cGy/ day,  5 times/week      DISEASE UNDER TREATMENT: Recurrent Adenocarcinoma of Prostate  after surgery(8/7/2017). 2/5 right common iliac pLN+ on LN surgery(11/19/2020). Preop Axumin PET findings+ for LN met(9/20/2019).    ADT: Lupron 22.5 mg  (12/17/2019)    SUBJECTIVE:    CTC V5.0 Toxicity Criteria  Fatigue: Grade 0: No toxicity  Pain Score: 0 /10     :   Urinary Frequency/Urgency: Grade 1: Increase in frequency or nocturia up to 2X normal  Urinary Incontinence: Grade 0: No change from baseline  Dysuria:Grade 0: No change from baseline  Nocturia: 2(baseline 1)  Comment:    GI:  Diarrhea:Grade 1  Increase of <4 stools/day over baseline  Proctitis: Grade 0 No symptom  Comment:      OBJECTIVE:  Wt Readings from Last 2 Encounters:   03/05/20 86.5 kg (190 lb 11.2 oz)   02/27/20 88.5 kg (195 lb)         IMPRESSION: The patient is tolerating the treatment.  The patient set up, dose, and cone beam CT images were reviewed.      PLAN: Continue radiotherapy.  Lupron for minimum of 24 M but likely indefinitely. We discussed possible use of Flomax if the  symptoms get worse but it was not started today    PAIN MANAGEMENT PLAN: The patient does not require pain management    ABRAHAM Schreiber M.D.  Department of Radiation Oncology  [facility]    Again, thank you for allowing me to participate in the care of your patient.      Sincerely,    Doug Schreiber MD

## 2020-03-06 ENCOUNTER — APPOINTMENT (OUTPATIENT)
Dept: RADIATION ONCOLOGY | Facility: CLINIC | Age: 65
End: 2020-03-06
Attending: RADIOLOGY
Payer: MEDICARE

## 2020-03-06 PROCEDURE — 77336 RADIATION PHYSICS CONSULT: CPT | Performed by: RADIOLOGY

## 2020-03-06 PROCEDURE — 77385 ZZH IMRT TREATMENT DELIVERY, SIMPLE: CPT | Performed by: RADIOLOGY

## 2020-03-09 ENCOUNTER — APPOINTMENT (OUTPATIENT)
Dept: RADIATION ONCOLOGY | Facility: CLINIC | Age: 65
End: 2020-03-09
Attending: RADIOLOGY
Payer: MEDICARE

## 2020-03-09 PROCEDURE — 77385 ZZH IMRT TREATMENT DELIVERY, SIMPLE: CPT | Performed by: RADIOLOGY

## 2020-03-10 ENCOUNTER — APPOINTMENT (OUTPATIENT)
Dept: RADIATION ONCOLOGY | Facility: CLINIC | Age: 65
End: 2020-03-10
Attending: RADIOLOGY
Payer: MEDICARE

## 2020-03-10 PROCEDURE — 77385 ZZH IMRT TREATMENT DELIVERY, SIMPLE: CPT | Performed by: RADIOLOGY

## 2020-03-11 ENCOUNTER — APPOINTMENT (OUTPATIENT)
Dept: RADIATION ONCOLOGY | Facility: CLINIC | Age: 65
End: 2020-03-11
Attending: RADIOLOGY
Payer: MEDICARE

## 2020-03-11 PROCEDURE — 77385 ZZH IMRT TREATMENT DELIVERY, SIMPLE: CPT | Performed by: RADIOLOGY

## 2020-03-12 ENCOUNTER — OFFICE VISIT (OUTPATIENT)
Dept: RADIATION ONCOLOGY | Facility: CLINIC | Age: 65
End: 2020-03-12
Attending: RADIOLOGY
Payer: MEDICARE

## 2020-03-12 VITALS
OXYGEN SATURATION: 99 % | DIASTOLIC BLOOD PRESSURE: 58 MMHG | SYSTOLIC BLOOD PRESSURE: 111 MMHG | HEART RATE: 81 BPM | BODY MASS INDEX: 27.51 KG/M2 | WEIGHT: 191.7 LBS

## 2020-03-12 DIAGNOSIS — C61 MALIGNANT NEOPLASM OF PROSTATE (H): Primary | ICD-10-CM

## 2020-03-12 PROCEDURE — 77385 ZZH IMRT TREATMENT DELIVERY, SIMPLE: CPT | Performed by: RADIOLOGY

## 2020-03-12 NOTE — LETTER
3/12/2020       RE: Zander Stallworth  [address]  [place] [address]     Dear Colleague,    Thank you for referring your patient, Zander Stallworth, to the RADIATION ONCOLOGY CLINIC. Please see a copy of my visit note below.    .  WEEKLY MANAGEMENT NOTE  Radiation Oncology          Patient Name: Zander Stallworth  MRN: [id number]       Pelvis Current Dose: 5800/7000 cGy Fractions: 29/35          DAILY DOSE:     200  cGy/ day,  5 times/week      DISEASE UNDER TREATMENT: Recurrent Adenocarcinoma of Prostate  after surgery(8/7/2017). 2/5 right common iliac pLN+ on LN surgery(11/19/2020). Preop Axumin PET findings+ for LN met(9/20/2019).    ADT: Lupron 22.5 mg  (12/17/2019). Plan indefinite, minimum 28M    SUBJECTIVE:    CTC V5.0 Toxicity Criteria  Fatigue: Grade 0: No toxicity  Pain Score: 0 /10     :   Urinary Frequency/Urgency: Grade 2: Increase > 2X normal but less than hourly  Urinary Incontinence: Grade 1: Occasional(e.g. with coughing, sneezing, etc), pads NOT indicated  Dysuria:Grade 0: No change from baseline  Nocturia: 2(baseline 1)  Comment:    GI:  Diarrhea:Grade 1  Increase of <4 stools/day over baseline  Proctitis: Grade 0 No symptom  Comment:      OBJECTIVE:  Wt Readings from Last 2 Encounters:   03/12/20 87 kg (191 lb 11.2 oz)   03/05/20 86.5 kg (190 lb 11.2 oz)         IMPRESSION: The patient is tolerating the treatment.  The patient set up, dose, and cone beam CT images were reviewed.      PLAN: Continue radiotherapy.  Lupron for minimum of 24 M but likely indefinitely. We discussed possible use of Flomax if the  symptoms get worse but it was not started today. Flomax use is delayed.    PAIN MANAGEMENT PLAN: The patient does not require pain management    ABRAHAM Schreiber M.D.  Department of Radiation Oncology  [facility]

## 2020-03-12 NOTE — PROGRESS NOTES
.  WEEKLY MANAGEMENT NOTE  Radiation Oncology          Patient Name: Zander Stallworth  MRN: [id number]       Pelvis Current Dose: 5800/7000 cGy Fractions: 29/35          DAILY DOSE:     200  cGy/ day,  5 times/week      DISEASE UNDER TREATMENT: Recurrent Adenocarcinoma of Prostate  after surgery(8/7/2017). 2/5 right common iliac pLN+ on LN surgery(11/19/2020). Preop Axumin PET findings+ for LN met(9/20/2019).    ADT: Lupron 22.5 mg  (12/17/2019). Plan indefinite, minimum 28M    SUBJECTIVE:    CTC V5.0 Toxicity Criteria  Fatigue: Grade 0: No toxicity  Pain Score: 0 /10     :   Urinary Frequency/Urgency: Grade 2: Increase > 2X normal but less than hourly  Urinary Incontinence: Grade 1: Occasional(e.g. with coughing, sneezing, etc), pads NOT indicated  Dysuria:Grade 0: No change from baseline  Nocturia: 2(baseline 1)  Comment:    GI:  Diarrhea:Grade 1  Increase of <4 stools/day over baseline  Proctitis: Grade 0 No symptom  Comment:      OBJECTIVE:  Wt Readings from Last 2 Encounters:   03/12/20 87 kg (191 lb 11.2 oz)   03/05/20 86.5 kg (190 lb 11.2 oz)         IMPRESSION: The patient is tolerating the treatment.  The patient set up, dose, and cone beam CT images were reviewed.      PLAN: Continue radiotherapy.  Lupron for minimum of 24 M but likely indefinitely. We discussed possible use of Flomax if the  symptoms get worse but it was not started today. Flomax use is delayed.    PAIN MANAGEMENT PLAN: The patient does not require pain management    ABRAHAM Schreiber M.D.  Department of Radiation Oncology  [facility]

## 2020-03-13 ENCOUNTER — APPOINTMENT (OUTPATIENT)
Dept: RADIATION ONCOLOGY | Facility: CLINIC | Age: 65
End: 2020-03-13
Attending: RADIOLOGY
Payer: MEDICARE

## 2020-03-13 PROCEDURE — 77336 RADIATION PHYSICS CONSULT: CPT | Performed by: RADIOLOGY

## 2020-03-13 PROCEDURE — 77385 ZZH IMRT TREATMENT DELIVERY, SIMPLE: CPT | Performed by: RADIOLOGY

## 2020-03-15 ENCOUNTER — HEALTH MAINTENANCE LETTER (OUTPATIENT)
Age: 65
End: 2020-03-15

## 2020-03-16 ENCOUNTER — TELEPHONE (OUTPATIENT)
Dept: ONCOLOGY | Facility: CLINIC | Age: 65
End: 2020-03-16

## 2020-03-16 ENCOUNTER — APPOINTMENT (OUTPATIENT)
Dept: RADIATION ONCOLOGY | Facility: CLINIC | Age: 65
End: 2020-03-16
Attending: RADIOLOGY
Payer: MEDICARE

## 2020-03-16 PROCEDURE — 77385 ZZH IMRT TREATMENT DELIVERY, SIMPLE: CPT | Performed by: RADIOLOGY

## 2020-03-16 NOTE — TELEPHONE ENCOUNTER
L/M for Zander to call re: his RTC appt with Dr. Abelardo Hoang on 3/17. Informed Zander we need to either reschedule his appt or turn it into a telephone consult. Asked Zander to return call to confirm receiving message and inform us of his preference for either telephone consult vs rescheduling visit.     Susan Lema, VALARIEN, RN  RN Care Coordinator  [facility]  Dr. Abelardo Hoang

## 2020-03-17 ENCOUNTER — TELEPHONE (OUTPATIENT)
Facility: CLINIC | Age: 65
End: 2020-03-17

## 2020-03-17 ENCOUNTER — APPOINTMENT (OUTPATIENT)
Dept: RADIATION ONCOLOGY | Facility: CLINIC | Age: 65
End: 2020-03-17
Attending: RADIOLOGY
Payer: MEDICARE

## 2020-03-17 ENCOUNTER — VIRTUAL VISIT (OUTPATIENT)
Dept: ONCOLOGY | Facility: CLINIC | Age: 65
End: 2020-03-17
Attending: INTERNAL MEDICINE
Payer: MEDICARE

## 2020-03-17 DIAGNOSIS — C61 MALIGNANT NEOPLASM OF PROSTATE (H): Primary | ICD-10-CM

## 2020-03-17 PROCEDURE — 40000114 ZZH STATISTIC NO CHARGE CLINIC VISIT

## 2020-03-17 PROCEDURE — 99442 ZZC PHYSICIAN TELEPHONE EVALUATION 11-20 MIN: CPT | Mod: ZP | Performed by: INTERNAL MEDICINE

## 2020-03-17 PROCEDURE — 77385 ZZH IMRT TREATMENT DELIVERY, SIMPLE: CPT | Performed by: RADIOLOGY

## 2020-03-17 NOTE — PROGRESS NOTES
"MEDICAL ONCOLOGY VIRTUAL VISIT NOTE    Zander Stallworth is a 64 year old male who is being evaluated via a billable telephone visit.      The patient has been notified of following:     \"This telephone visit will be conducted via a call between you and your physician/provider. We have found that certain health care needs can be provided without the need for a physical exam.  This service lets us provide the care you need with a short phone conversation.  If a prescription is necessary we can send it directly to your pharmacy.  If lab work is needed we can place an order for that and you can then stop by our lab to have the test done at a later time.    If during the course of the call the physician/provider feels a telephone visit is not appropriate, you will not be charged for this service.\"     Zander Stallworth complains of    Chief Complaint   Patient presents with     Telephone     Prostate Ca     I have reviewed and updated the patient's Past Medical History, Social History, Family History and Medication List.    ALLERGIES  Amoxicillin-pot clavulanate; Erythromycin; and Sildenafil    Aminata Tapia(MA signature)    PHYSICIAN NOTES:    Called patient today. He's undergoing RT to pelvis - last week started yesterday. Has bladder urgency; no hematuria; mild dysuria (ache) x 1 week; no fever. No fatigue.     Lupron planned for tomorrow. First dose was in mid Dec - tolerated well except for mild hot flashes and mild loss of muscle mass, no fatigue. Gained some weight but has joined [employer] recently.    Assessment/Plan:  1. Locally-recurrent prostate cancer: Pt getting salvage RT+ADT for locally-recurrent prostate ca. Tolerating well and will get next dose of lupron tomorrow. Upon his request, will send a message to Dr. Schreiber to give him a 6-monthly dose instead of 3-monthly dose. Pt asked to remind his RN tomorrow as well.  Concur with 2 years (or more) of planned ADT per Dr. Schreiber.    Recommended PSA every 3 months for " next ~2 years. PSMA PET scan if PSA rises. Primary onc mgmt per Dr. Schreiber and keep f/up with me in Dec 2020.     2. Muscle loss: Had joined a gym through health plan but unable to access. Discussed resistance exercises. Continue doing 2788-1625 steps a day.     I have reviewed the note as documented above.  This accurately captures the substance of my conversation with the patient.    Phone call contact time  Call Started at 2:38 PM  Call Ended at 2:51 PM    Abelardo Hoang M.D.  . Professor of Medicine  Genitourinary Oncology  Division of Hematology, Oncology & Transplantation  [facility]

## 2020-03-17 NOTE — TELEPHONE ENCOUNTER
COVID-19 PRE-INFUSION APPOINTMENT PHONE SCREEN    1. In the last month, have you been in contact with someone who was confirmed or suspected to have Coronavirus/COVID-19? NO    2. Do you have any of the following symptoms:     a. Fever (or reported chills): NO    b. Cough: NO    c. Shortness of Breath: NO    d. Rash: NO    3. Have you traveled internationally or in the [place] in the last month? Internationally: [place], [place], [place], [place] and all of [place].  In the [place]: [place], [place], [place], [place], [place] and [place]. NO    a. If so, where?     Recommendation:    [facility]

## 2020-03-17 NOTE — LETTER
"  3/17/2020      RE: Zander Stallworth  [address]  [place] [address]       MEDICAL ONCOLOGY VIRTUAL VISIT NOTE    Zander Stallworth is a 64 year old male who is being evaluated via a billable telephone visit.      The patient has been notified of following:     \"This telephone visit will be conducted via a call between you and your physician/provider. We have found that certain health care needs can be provided without the need for a physical exam.  This service lets us provide the care you need with a short phone conversation.  If a prescription is necessary we can send it directly to your pharmacy.  If lab work is needed we can place an order for that and you can then stop by our lab to have the test done at a later time.    If during the course of the call the physician/provider feels a telephone visit is not appropriate, you will not be charged for this service.\"     Zander Stallworth complains of    Chief Complaint   Patient presents with     Telephone     Prostate Ca     I have reviewed and updated the patient's Past Medical History, Social History, Family History and Medication List.    ALLERGIES  Amoxicillin-pot clavulanate; Erythromycin; and Sildenafil    Aminata Tapia(MA signature)    PHYSICIAN NOTES:    Called patient today. He's undergoing RT to pelvis - last week started yesterday. Has bladder urgency; no hematuria; mild dysuria (ache) x 1 week; no fever. No fatigue.     Lupron planned for tomorrow. First dose was in mid Dec - tolerated well except for mild hot flashes and mild loss of muscle mass, no fatigue. Gained some weight but has joined [employer] recently.    Assessment/Plan:  1. Locally-recurrent prostate cancer: Pt getting salvage RT+ADT for locally-recurrent prostate ca. Tolerating well and will get next dose of lupron tomorrow. Upon his request, will send a message to Dr. Schreiber to give him a 6-monthly dose instead of 3-monthly dose. Pt asked to remind his RN tomorrow as well.  Concur with 2 " years (or more) of planned ADT per Dr. Schreiber.    Recommended PSA every 3 months for next ~2 years. PSMA PET scan if PSA rises. Primary onc mgmt per Dr. Schreiber and keep f/up with me in Dec 2020.     2. Muscle loss: Had joined a gym through health plan but unable to access. Discussed resistance exercises. Continue doing 4543-0594 steps a day.     I have reviewed the note as documented above.  This accurately captures the substance of my conversation with the patient.    Phone call contact time  Call Started at 2:38 PM  Call Ended at 2:51 PM    Abelardo Hoang M.D.  Jo Annt. Professor of Medicine  Genitourinary Oncology  Division of Hematology, Oncology & Transplantation  [facility]      Abelardo Hoang MD

## 2020-03-18 ENCOUNTER — OFFICE VISIT (OUTPATIENT)
Dept: RADIATION ONCOLOGY | Facility: CLINIC | Age: 65
End: 2020-03-18
Attending: RADIOLOGY
Payer: MEDICARE

## 2020-03-18 ENCOUNTER — ALLIED HEALTH/NURSE VISIT (OUTPATIENT)
Dept: ONCOLOGY | Facility: CLINIC | Age: 65
End: 2020-03-18
Attending: RADIOLOGY
Payer: MEDICARE

## 2020-03-18 VITALS
BODY MASS INDEX: 27.41 KG/M2 | WEIGHT: 191 LBS | HEART RATE: 84 BPM | SYSTOLIC BLOOD PRESSURE: 122 MMHG | DIASTOLIC BLOOD PRESSURE: 64 MMHG

## 2020-03-18 VITALS
TEMPERATURE: 97.8 F | HEART RATE: 70 BPM | RESPIRATION RATE: 18 BRPM | BODY MASS INDEX: 28.5 KG/M2 | OXYGEN SATURATION: 98 % | WEIGHT: 198.6 LBS | DIASTOLIC BLOOD PRESSURE: 64 MMHG | SYSTOLIC BLOOD PRESSURE: 132 MMHG

## 2020-03-18 DIAGNOSIS — C61 MALIGNANT NEOPLASM OF PROSTATE (H): Primary | ICD-10-CM

## 2020-03-18 PROCEDURE — 96402 CHEMO HORMON ANTINEOPL SQ/IM: CPT

## 2020-03-18 PROCEDURE — 25000128 H RX IP 250 OP 636: Mod: ZF | Performed by: RADIOLOGY

## 2020-03-18 PROCEDURE — 77385 ZZH IMRT TREATMENT DELIVERY, SIMPLE: CPT | Performed by: RADIOLOGY

## 2020-03-18 RX ADMIN — LEUPROLIDE ACETATE 45 MG: KIT at 14:53

## 2020-03-18 ASSESSMENT — PAIN SCALES - GENERAL: PAINLEVEL: NO PAIN (0)

## 2020-03-18 NOTE — PROGRESS NOTES
Infusion Nursing Note:  Zander Stallworth presents today for Lupron.    Patient seen by provider today: Yes: Dr Schreiber   present during visit today: Not Applicable.    Note: Pt saw provider prior to injection, ok for injection.    Intravenous Access:  No Intravenous access/labs at this visit.    Treatment Conditions:  Not Applicable.      Post Infusion Assessment:  Patient tolerated injection into R Gluteus without incident.       Discharge Plan:   Discharge instructions reviewed with: Patient.  Patient and/or family verbalized understanding of discharge instructions and all questions answered.  AVS to patient via [employer].  IB sent to scheduling for next Lurpon mid-September.  Patient discharged in stable condition accompanied by: self.  Departure Mode: Ambulatory.    Alda Fowler RN

## 2020-03-18 NOTE — PROGRESS NOTES
.  WEEKLY MANAGEMENT NOTE  Radiation Oncology          Patient Name: Zander Stallworth  MRN: [id number]       Pelvis Current Dose: 6600/7000 cGy Fractions: 33/35          DAILY DOSE:     200  cGy/ day,  5 times/week      DISEASE UNDER TREATMENT: Recurrent Adenocarcinoma of Prostate  after surgery(8/7/2017). 2/5 right common iliac pLN+ on LN surgery(11/19/2020). Preop Axumin PET findings+ for LN met(9/20/2019).    ADT: Lupron 22.5 mg  (12/17/2019). Plan indefinite, minimum 24M    SUBJECTIVE:    CTC V5.0 Toxicity Criteria  Fatigue: Grade 0: No toxicity  Pain Score: 0 /10     :   Urinary Frequency/Urgency: Grade 2: Increase > 2X normal but less than hourly  Urinary Incontinence: Grade 1: Occasional(e.g. with coughing, sneezing, etc), pads NOT indicated  Dysuria:Grade 0: No change from baseline  Nocturia: 2(baseline 1)  Comment: Had bladder spasm when it was full.    GI:  Diarrhea:Grade 1  Increase of <4 stools/day over baseline  Proctitis: Grade 0 No symptom  Comment:      OBJECTIVE:  Wt Readings from Last 2 Encounters:   03/18/20 86.6 kg (191 lb)   03/12/20 87 kg (191 lb 11.2 oz)         IMPRESSION: The patient is tolerating the treatment.  The patient set up, dose, and cone beam CT images were reviewed.      PLAN: Continue radiotherapy.  Lupron for minimum of 24 M but likely indefinitely. Lupron was changed to 6M preparation. Flomax will not be started    PAIN MANAGEMENT PLAN: The patient does not require pain management    ABRAHAM Schreiber M.D.  Department of Radiation Oncology  [facility]

## 2020-03-18 NOTE — LETTER
3/18/2020       RE: Zander Stallworth  [address]  [place] [address]     Dear Colleague,    Thank you for referring your patient, Zander Stallworth, to the RADIATION ONCOLOGY CLINIC. Please see a copy of my visit note below.    .  WEEKLY MANAGEMENT NOTE  Radiation Oncology          Patient Name: Zander Stallworth  MRN: [id number]       Pelvis Current Dose: 6600/7000 cGy Fractions: 33/35          DAILY DOSE:     200  cGy/ day,  5 times/week      DISEASE UNDER TREATMENT: Recurrent Adenocarcinoma of Prostate  after surgery(8/7/2017). 2/5 right common iliac pLN+ on LN surgery(11/19/2020). Preop Axumin PET findings+ for LN met(9/20/2019).    ADT: Lupron 22.5 mg  (12/17/2019). Plan indefinite, minimum 24M    SUBJECTIVE:    CTC V5.0 Toxicity Criteria  Fatigue: Grade 0: No toxicity  Pain Score: 0 /10     :   Urinary Frequency/Urgency: Grade 2: Increase > 2X normal but less than hourly  Urinary Incontinence: Grade 1: Occasional(e.g. with coughing, sneezing, etc), pads NOT indicated  Dysuria:Grade 0: No change from baseline  Nocturia: 2(baseline 1)  Comment: Had bladder spasm when it was full.    GI:  Diarrhea:Grade 1  Increase of <4 stools/day over baseline  Proctitis: Grade 0 No symptom  Comment:      OBJECTIVE:  Wt Readings from Last 2 Encounters:   03/18/20 86.6 kg (191 lb)   03/12/20 87 kg (191 lb 11.2 oz)         IMPRESSION: The patient is tolerating the treatment.  The patient set up, dose, and cone beam CT images were reviewed.      PLAN: Continue radiotherapy.  Lupron for minimum of 24 M but likely indefinitely. Lupron was changed to 6M preparation. Flomax will not be started    PAIN MANAGEMENT PLAN: The patient does not require pain management    ABRAHAM Schreiber M.D.  Department of Radiation Oncology  [facility]

## 2020-03-18 NOTE — PATIENT INSTRUCTIONS
Contact Numbers  [facility]: [phone]    After Hours:  [phone]  Triage: [phone]    Please call the [facility] Triage line if you experience a temperature greater than or equal to 100.5, shaking chills, have uncontrolled nausea, vomiting and/or diarrhea, dizziness, shortness of breath, chest pain, bleeding, unexplained bruising, or if you have any other new/concerning symptoms, questions or concerns.     If it is after hours, weekends, or holidays, please call the main hospital  at  [phone] and ask to speak to the Oncology doctor on call.     If you are having any concerning symptoms or wish to speak to a provider before your next infusion visit, please call your care coordinator or triage to notify them so we can adequately serve you.     If you need a refill on a narcotic prescription or other medication, please call triage before your infusion appointment.

## 2020-03-18 NOTE — NURSING NOTE
Chief Complaint   Patient presents with     Blood Draw     No labs drawn; pt declinied     /64 (BP Location: Left arm, Patient Position: Chair, Cuff Size: Adult Regular)   Pulse 70   Temp 97.8  F (36.6  C) (Oral)   Resp 18   Wt 90.1 kg (198 lb 9.6 oz)   SpO2 98%   BMI 28.50 kg/m      No Labs drawn.  Vital signs taken in lab. Pt tolerated well.   Pt checked in for next appointment.    Yvonne Pedroza RN

## 2020-03-19 ENCOUNTER — APPOINTMENT (OUTPATIENT)
Dept: RADIATION ONCOLOGY | Facility: CLINIC | Age: 65
End: 2020-03-19
Attending: RADIOLOGY
Payer: MEDICARE

## 2020-03-19 PROCEDURE — 77385 ZZH IMRT TREATMENT DELIVERY, SIMPLE: CPT | Performed by: RADIOLOGY

## 2020-03-20 ENCOUNTER — ONCOLOGY VISIT (OUTPATIENT)
Dept: RADIATION ONCOLOGY | Facility: CLINIC | Age: 65
End: 2020-03-20

## 2020-03-20 ENCOUNTER — APPOINTMENT (OUTPATIENT)
Dept: RADIATION ONCOLOGY | Facility: CLINIC | Age: 65
End: 2020-03-20
Attending: RADIOLOGY
Payer: MEDICARE

## 2020-03-20 DIAGNOSIS — C61 MALIGNANT NEOPLASM OF PROSTATE (H): Primary | ICD-10-CM

## 2020-03-20 PROCEDURE — 77336 RADIATION PHYSICS CONSULT: CPT | Performed by: RADIOLOGY

## 2020-03-20 PROCEDURE — 77385 ZZH IMRT TREATMENT DELIVERY, SIMPLE: CPT | Performed by: RADIOLOGY

## 2020-03-26 NOTE — PROCEDURES
Radiotherapy Treatment Summary          Date of Report: 2020     PATIENT: RONALDO STALLWORTH  MEDICAL RECORD NO: [id number]  : 1955     DIAGNOSIS: C61 Malignant neoplasm of prostate  INTENT OF RADIOTHERAPY: Cure  PATHOLOGY: Adenocarcinoma of the prostate                                  STAGE: N/A  CONCURRENT SYSTEMIC THERAPY:  Lupron 22.5 mg  (2019). Plan indefinite, minimum 24 months                  Details of the treatments summarized below are found in records kept in the Department of Radiation Oncology at [facility].     Treatment Summary:  Radiation Oncology - Course: 1 Protocol:   Treatment Site Dose  Modality From To  Days Fx.  Pelvis    4,600 cGy 10 X  2/03/2020  3/04/2020  30 23  Pelvis Boost  2,400 cGy 10 X  3/05/2020  3/20/2020  15 12          Dose per Fraction: 200 cGy         Total Dose: Pelvis 4600 cGy, Prostate fossa boost 7000 cGy             COMMENTS:                         Mr. Stallworth is a 63 year old male with prostate cancer, with a pre-op was PSA 2.7 with highest Hammond's score of 5+3=8.   He was s/p RALP and BLND in 2017, with no evidence of EPE, SVI, federica involvement (0/3) and negative margins   were achieved. His post-op PSA was initially undetectable until it was 0.1 on 2/15/2019 and mariano up to 0.2 on 2019.   Axumin PET/CT on 2019 showed FDG avidity in one right common iliac node and on right internal iliac node. He   underwent repeat BLND on 2019, with pathology revealing high grade neuroendocrine tumor in 2/5 right common   iliac nodes dissected. This was deemed to be recurrence of his prostate cancer, and he proceeded to undergo salvage   radiotherapy with indefinite ADT as described above. A total of 4600 cGy was delivered to his pelvis in 23 once-daily   fractions using two 10 MV photon beams via volumetric modulated arc therapy technique. The prostate fossa was   sequentially boosted to a total dose 7000 cGy with another 12 once-daily fractions.   Mr. Stallworth tolerated radiotherapy   relatively well, and by the end of his treatment, he developed expected side effect of increased urinary symptoms, and   diarrhea. He also experienced occasional urinary incontinence, and bladder spasm when asked to hold a full bladder. He   completed all prescribed radiotherapy on 3/20/20 without unplanned breaks.       ED visits/hospitalizations: No     Missed treatments: No     Acute Toxicity Profile by CTC v5.0:     :   Urinary Frequency/Urgency: Grade 2: Increase > 2X normal but less than hourly  Urinary Incontinence: Grade 1: Occasional(e.g. with coughing, sneezing, etc), pads NOT indicated  Nocturia: 2(baseline 1)     GI:  Diarrhea: Grade 1  Increase of <4 stools/day over baseline     PAIN MANAGEMENT: he did not need pain medications.                              FOLLOW UP PLAN:          -Follow up with Dr. Hoang of Oncology for management of his cancer.  -RTC in 2 months after repeat PSA.                       Resident Physician: Jose Gilliland M.D.   Staff Physician: ABRAHAM Schreiber M.D.  Physicist: Santosh Brown, PhD     CC:   MD Ousmane Beal MD                                     Radiation Oncology:  [facility] [address] [place] [address]

## 2020-05-07 NOTE — PROGRESS NOTES
"Zander Stallworth is a 64 year old male who is being evaluated via a billable telephone visit.      The patient has been notified of following:     \"This telephone visit will be conducted via a call between you and your physician/provider. We have found that certain health care needs can be provided without the need for a physical exam.  This service lets us provide the care you need with a short phone conversation.  If a prescription is necessary we can send it directly to your pharmacy.  If lab work is needed we can place an order for that and you can then stop by our lab to have the test done at a later time.    Telephone visits are billed at different rates depending on your insurance coverage. During this emergency period, for some insurers they may be billed the same as an in-person visit.  Please reach out to your insurance provider with any questions.    If during the course of the call the physician/provider feels a telephone visit is not appropriate, you will not be charged for this service.\"    Patient has given verbal consent for Telephone visit?  Yes    What phone number would you like to be contacted at? [phone]    Phone call duration: 15 minutes        [facility] [place]  Radiation Oncology Follow-up Note  May 12, 2020    Zander Stallworth   MRN: [id number]  : 1955     DISEASE TREATED: Recurrent high risk prostate cancer, high-grade neuroendocrine differentiation.[id number], Hallie 3+4=7, PSA=0.2.  PET/CT scan + for      INTERVAL SINCE COMPLETION OF RADIATION THERAPY: 2 months, completed treatment 3/4/2020     TYPE OF RADIATION THERAPY ADMINISTERED: 4600 cGy in 23 fractions to the pelvis with a boost to the prostate fossa, total 7000 cGy in 35 fractions    HORMONAL THERAPY: Indefinite, Lupron 22.5 mg given 2019, 45 mg given 3/18/2020     SUBJECTIVE/HPI:   Mr. Stallworth is a 63 year old man with prostate cancer, with a pre-op was PSA 2.7 with highest Fertile's score of " 5+3=8. He was underwent RALP and BLND in 05/07/2017, with no evidence of EPE, SVI, federica involvement (0/3) and negative margins were achieved.     His post-op PSA was initially undetectable until it was 0.1 on 2/15/2019 and mariano up to 0.2 on 8/29/2019. Axumin PET/CT on 9/20/2019 showed FDG avidity in one right common iliac node and an right internal iliac node. He underwent repeat BLND on 11/19/2019, with pathology revealing high grade neuroendocrine tumor in 2/5 right common iliac nodes dissected. This was deemed to be recurrence of his prostate cancer, and he proceeded to undergo salvage radiotherapy with indefinite ADT as described above.     Mr. Stallworth tolerated radiotherapy relatively well, and by the end of his treatment, he developed expected side effect of increased urinary symptoms, and diarrhea. He also experienced occasional urinary incontinence, and bladder spasm when asked to hold a full bladder.  He returns via telephone for routine follow-up.   PSA on 5/8/2020 was <0.01.    On interview today, the patient complained of proctitis that was better with Anucort HC. He is experiencing hot flashes. AUA Score: 6/35       LABS:  Date PSA   5/8/2020 <0.01   3/18/2020 Lupron 45 mg   3/4/2020 Salvage Radiotherapy   12/17/2019 Lupron 22.5 mg   12/17/2020 (pre-RT) 0.34   9/30/2019 0.2   8/29/2019 0.2   5/20/2019 0.1   2/14/2019 0.1   8/14/2018 <0.1   2/12/2018 <0.1   11/14/2017 (post-op) <0.1   8/7/2017 RARP   5/12/2017 2.7   6/10/2015 1.99   9/26/2014 1.64         IMPRESSION: No clinical evidence for recurrent disease at this time. He has GI side effects from external beam therapy.     RECOMMENDATIONS:  Patient will followup in 6 months with PSA. He will also call me in 2-3 months to report on the anal irritation. If the symptoms are not better, I will consider colo-rectal surgery referral.    The Lupron needs to be long term as tolerated.    ABRAHAM Schreiber M.D.  Department of Radiation Oncology  [facility]  [facility]

## 2020-05-08 DIAGNOSIS — C61 MALIGNANT NEOPLASM OF PROSTATE (H): ICD-10-CM

## 2020-05-08 LAB — PSA SERPL-MCNC: <0.01 UG/L (ref 0–4)

## 2020-05-12 ENCOUNTER — VIRTUAL VISIT (OUTPATIENT)
Dept: RADIATION ONCOLOGY | Facility: CLINIC | Age: 65
End: 2020-05-12
Attending: RADIOLOGY
Payer: MEDICARE

## 2020-05-12 DIAGNOSIS — C61 MALIGNANT NEOPLASM OF PROSTATE (H): Primary | ICD-10-CM

## 2020-05-12 NOTE — LETTER
"2020       RE: Zander Stallworth  [address]  [place] [address]     Dear Colleague,    Thank you for referring your patient, Zander Stallworth, to the RADIATION ONCOLOGY CLINIC. Please see a copy of my visit note below.    Zander Stallworth is a 64 year old male who is being evaluated via a billable telephone visit.      The patient has been notified of following:     \"This telephone visit will be conducted via a call between you and your physician/provider. We have found that certain health care needs can be provided without the need for a physical exam.  This service lets us provide the care you need with a short phone conversation.  If a prescription is necessary we can send it directly to your pharmacy.  If lab work is needed we can place an order for that and you can then stop by our lab to have the test done at a later time.    Telephone visits are billed at different rates depending on your insurance coverage. During this emergency period, for some insurers they may be billed the same as an in-person visit.  Please reach out to your insurance provider with any questions.    If during the course of the call the physician/provider feels a telephone visit is not appropriate, you will not be charged for this service.\"    Patient has given verbal consent for Telephone visit?  Yes    What phone number would you like to be contacted at? [phone]    Phone call duration: 15 minutes        [facility] [place]  Radiation Oncology Follow-up Note  May 12, 2020    Zander Stallworth   MRN: [id number]  : 1955     DISEASE TREATED: Recurrent high risk prostate cancer, high-grade neuroendocrine differentiation.[id number], Bennet 3+4=7, PSA=0.2.  PET/CT scan + for      INTERVAL SINCE COMPLETION OF RADIATION THERAPY: 2 months, completed treatment 3/4/2020     TYPE OF RADIATION THERAPY ADMINISTERED: 4600 cGy in 23 fractions to the pelvis with a boost to the prostate fossa, total 7000 cGy in 35 " fractions    HORMONAL THERAPY: Indefinite, Lupron 22.5 mg given 12/17/2019, 45 mg given 3/18/2020     SUBJECTIVE/HPI:   Mr. Stallworth is a 63 year old man with prostate cancer, with a pre-op was PSA 2.7 with highest Hallie's score of 5+3=8. He was underwent RALP and BLND in 05/07/2017, with no evidence of EPE, SVI, federica involvement (0/3) and negative margins were achieved.     His post-op PSA was initially undetectable until it was 0.1 on 2/15/2019 and mariano up to 0.2 on 8/29/2019. Axumin PET/CT on 9/20/2019 showed FDG avidity in one right common iliac node and an right internal iliac node. He underwent repeat BLND on 11/19/2019, with pathology revealing high grade neuroendocrine tumor in 2/5 right common iliac nodes dissected. This was deemed to be recurrence of his prostate cancer, and he proceeded to undergo salvage radiotherapy with indefinite ADT as described above.     Mr. Stallworth tolerated radiotherapy relatively well, and by the end of his treatment, he developed expected side effect of increased urinary symptoms, and diarrhea. He also experienced occasional urinary incontinence, and bladder spasm when asked to hold a full bladder.  He returns via telephone for routine follow-up.   PSA on 5/8/2020 was <0.01.    On interview today, the patient complained of proctitis that was better with Anucort HC. He is experiencing hot flashes. AUA Score: 6/35       LABS:  Date PSA   5/8/2020 <0.01   3/18/2020 Lupron 45 mg   3/4/2020 Salvage Radiotherapy   12/17/2019 Lupron 22.5 mg   12/17/2020 (pre-RT) 0.34   9/30/2019 0.2   8/29/2019 0.2   5/20/2019 0.1   2/14/2019 0.1   8/14/2018 <0.1   2/12/2018 <0.1   11/14/2017 (post-op) <0.1   8/7/2017 RARP   5/12/2017 2.7   6/10/2015 1.99   9/26/2014 1.64         IMPRESSION: No clinical evidence for recurrent disease at this time. He has GI side effects from external beam therapy.     RECOMMENDATIONS:  Patient will followup in 6 months with PSA. He will also call me in 2-3 months to  "report on the anal irritation. If the symptoms are not better, I will consider colo-rectal surgery referral.    The Lupron needs to be long term as tolerated.    ABRAHAM Schreiber M.D.  Department of Radiation Oncology  [facility]              Zander Stallworth is a 64 year old male who is being evaluated via a billable telephone visit.      The patient has been notified of following:     \"This telephone visit will be conducted via a call between you and your physician/provider. We have found that certain health care needs can be provided without the need for a physical exam.  This service lets us provide the care you need with a short phone conversation.  If a prescription is necessary we can send it directly to your pharmacy.  If lab work is needed we can place an order for that and you can then stop by our lab to have the test done at a later time.    Telephone visits are billed at different rates depending on your insurance coverage. During this emergency period, for some insurers they may be billed the same as an in-person visit.  Please reach out to your insurance provider with any questions.    If during the course of the call the physician/provider feels a telephone visit is not appropriate, you will not be charged for this service.\"    Patient has given verbal consent for Telephone visit?  Yes    What phone number would you like to be contacted at? [phone]    How would you like to obtain your AVS?     Phone call duration: 10 minutes        FOLLOW-UP VISIT    Patient Name: Zander Stallworth      : 1955     Age: 64 year old        ______________________________________________________________________________     Chief Complaint   Patient presents with     Cancer     fup for rad therapy to prostate bed     There were no vitals taken for this visit.     Date Radiation Completed: 3/4/2020    Pain  Denies    Labs  Other Labs: Yes: PSA< 0.01    Imaging  None        PSA:   PSA   Date " Value Ref Range Status   05/08/2020 <0.01 0 - 4 ug/L Final     Comment:     Assay Method:  Chemiluminescence using Siemens Vista analyzer   12/17/2019 0.34 0 - 4 ug/L Final     Comment:     Assay Method:  Chemiluminescence using Siemens Vista analyzer     Testosterone Level:   Testosterone Total   Date Value Ref Range Status   12/17/2019 275 240 - 950 ng/dL Final     Comment:     This test was developed and its performance characteristics determined by the   [facility],  Special Chemistry Laboratory. It has   not been cleared or approved by the FDA. The laboratory is regulated under   CLIA as qualified to perform high-complexity testing. This test is used for   clinical purposes. It should not be regarded as investigational or for   research.         On-Study AUA Symptom Score (PQ)  AUA (American Urological Association) Symptom Score  1. Over the past month, how often have you had a sensation of not emptying your bladder completely after you finished urinating?: Not at all  2. Over the past month, how often have you had to urinate again less than two hours after you finished urinating?: About half the time  3. Over the past month, how often have you found you stopped and started again several times when you urinated?: Not at all  4. Over the past month, how often have you found it difficult to postpone urination?: Less than half the time  5. Over the past month, how often have you had a weak urine stream?: Not at all  6. Over the past month, how often have you had to push or strain to begin urinating?: Not at all  7. Over the past month, how many times did you typically get up to urinate from the time you went to bed at night until the time you got up in the morning?: 1 time  AUA Score: 6    Diarrhea: 0- None    Constipation: 0- None      Other Appointments:     MD Name:  Appointment Date:    MD Name: Appointment Date:   MD Name: Appointment Date:   Other Appointment Notes:     Residual  Radiation side effect: hemorrhoids     Additional Instructions:     Nurse face-to-face time: Level 3:  10 min face to face time          Again, thank you for allowing me to participate in the care of your patient.      Sincerely,    Doug Schreiber MD

## 2020-05-12 NOTE — PROGRESS NOTES
"Zander Stallworth is a 64 year old male who is being evaluated via a billable telephone visit.      The patient has been notified of following:     \"This telephone visit will be conducted via a call between you and your physician/provider. We have found that certain health care needs can be provided without the need for a physical exam.  This service lets us provide the care you need with a short phone conversation.  If a prescription is necessary we can send it directly to your pharmacy.  If lab work is needed we can place an order for that and you can then stop by our lab to have the test done at a later time.    Telephone visits are billed at different rates depending on your insurance coverage. During this emergency period, for some insurers they may be billed the same as an in-person visit.  Please reach out to your insurance provider with any questions.    If during the course of the call the physician/provider feels a telephone visit is not appropriate, you will not be charged for this service.\"    Patient has given verbal consent for Telephone visit?  Yes    What phone number would you like to be contacted at? [phone]    How would you like to obtain your AVS?     Phone call duration: 10 minutes        FOLLOW-UP VISIT    Patient Name: Zander Stallworth      : 1955     Age: 64 year old        ______________________________________________________________________________     Chief Complaint   Patient presents with     Cancer     fup for rad therapy to prostate bed     There were no vitals taken for this visit.     Date Radiation Completed: 3/4/2020    Pain  Denies    Labs  Other Labs: Yes: PSA< 0.01    Imaging  None        PSA:   PSA   Date Value Ref Range Status   2020 <0.01 0 - 4 ug/L Final     Comment:     Assay Method:  Chemiluminescence using Siemens Vista analyzer   2019 0.34 0 - 4 ug/L Final     Comment:     Assay Method:  Chemiluminescence using Siemens Vista analyzer     Testosterone " Level:   Testosterone Total   Date Value Ref Range Status   12/17/2019 275 240 - 950 ng/dL Final     Comment:     This test was developed and its performance characteristics determined by the   [facility],  Special Chemistry Laboratory. It has   not been cleared or approved by the FDA. The laboratory is regulated under   CLIA as qualified to perform high-complexity testing. This test is used for   clinical purposes. It should not be regarded as investigational or for   research.         On-Study AUA Symptom Score (PQ)  AUA (American Urological Association) Symptom Score  1. Over the past month, how often have you had a sensation of not emptying your bladder completely after you finished urinating?: Not at all  2. Over the past month, how often have you had to urinate again less than two hours after you finished urinating?: About half the time  3. Over the past month, how often have you found you stopped and started again several times when you urinated?: Not at all  4. Over the past month, how often have you found it difficult to postpone urination?: Less than half the time  5. Over the past month, how often have you had a weak urine stream?: Not at all  6. Over the past month, how often have you had to push or strain to begin urinating?: Not at all  7. Over the past month, how many times did you typically get up to urinate from the time you went to bed at night until the time you got up in the morning?: 1 time  AUA Score: 6    Diarrhea: 0- None    Constipation: 0- None      Other Appointments:     MD Name:  Appointment Date:    MD Name: Appointment Date:   MD Name: Appointment Date:   Other Appointment Notes:     Residual Radiation side effect: hemorrhoids     Additional Instructions:     Nurse face-to-face time: Level 3:  10 min face to face time

## 2020-09-16 ENCOUNTER — ONCOLOGY VISIT (OUTPATIENT)
Dept: ONCOLOGY | Facility: CLINIC | Age: 65
End: 2020-09-16
Attending: INTERNAL MEDICINE
Payer: MEDICARE

## 2020-09-16 VITALS
DIASTOLIC BLOOD PRESSURE: 65 MMHG | OXYGEN SATURATION: 98 % | HEART RATE: 61 BPM | TEMPERATURE: 98.7 F | SYSTOLIC BLOOD PRESSURE: 119 MMHG

## 2020-09-16 DIAGNOSIS — C61 MALIGNANT NEOPLASM OF PROSTATE (H): Primary | ICD-10-CM

## 2020-09-16 PROCEDURE — 96402 CHEMO HORMON ANTINEOPL SQ/IM: CPT

## 2020-09-16 PROCEDURE — 25000128 H RX IP 250 OP 636: Mod: ZF | Performed by: RADIOLOGY

## 2020-09-16 PROCEDURE — 96401 CHEMO ANTI-NEOPL SQ/IM: CPT

## 2020-09-16 RX ADMIN — LEUPROLIDE ACETATE 45 MG: KIT at 15:02

## 2020-09-16 ASSESSMENT — PAIN SCALES - GENERAL: PAINLEVEL: MILD PAIN (2)

## 2020-09-16 NOTE — LETTER
2020         RE: Zanedr Stallworth  [address]  [place] [address]        Dear Colleague,    Thank you for referring your patient, Zander Stallworth, to the [facility]. Please see a copy of my visit note below.    Patient presents to the [facility] for leuprolide (LUPRON DEPOT) kit 45 mg. Order written by Dr. Schreiber was completed today. Name and  were verified by patient. See MAR for medication details. Patient was asked if they have any new symptoms or questions/concerns. Medication was given in the following site: left ventral gluteal. Patient tolerated injection well and was discharged to home.    -Patti ANN CMA    Again, thank you for allowing me to participate in the care of your patient.        Sincerely,        [facility] [place]

## 2020-09-16 NOTE — PROGRESS NOTES
Patient presents to the [facility] for leuprolide (LUPRON DEPOT) kit 45 mg. Order written by Dr. Schreiber was completed today. Name and  were verified by patient. See MAR for medication details. Patient was asked if they have any new symptoms or questions/concerns. Medication was given in the following site: left ventral gluteal. Patient tolerated injection well and was discharged to home.    -Patti ANN, CMA

## 2020-11-10 ENCOUNTER — MEDICAL CORRESPONDENCE (OUTPATIENT)
Dept: HEALTH INFORMATION MANAGEMENT | Facility: CLINIC | Age: 65
End: 2020-11-10

## 2020-11-11 DIAGNOSIS — E11.9 DIABETES MELLITUS (H): Primary | ICD-10-CM

## 2020-11-12 NOTE — PROGRESS NOTES
[facility], [place]  Radiation Oncology Follow-up Note  2020    Zander Stallworth   MRN: [id number]  : 1955     DISEASE TREATED: Recurrent high risk prostate cancer, high-grade neuroendocrine differentiation. [id number], Hallie 3+4=7, PSA=0.2.  Axumin PET/CT scan for LN+     INTERVAL SINCE COMPLETION OF RADIATION THERAPY: 8 months (completed on 3/4/2020)     TYPE OF RADIATION THERAPY ADMINISTERED: 7000 cGy in 35 fractions (4600cGy to LN + Boost)      HORMONAL THERAPY: Plan Indefinite, Lupron 22.5 mg given 2019, 45 mg given 3/18/2020, 2020     SUBJECTIVE/HPI:   Mr. Stallworth is a 63 year old man with prostate cancer, with a pre-op was PSA 2.7 with highest [place]'s score of 5+3=8. He was underwent RALP and BLND in 2017, with no evidence of EPE, SVI, federica involvement (0/3) and negative margins were achieved.     His post-op PSA was initially undetectable until it was 0.1 on 2/15/2019 and mariano up to 0.2 on 2019. Axumin PET/CT on 2019 showed FDG avidity in one right common iliac node and an right internal iliac node. He underwent repeat BLND on 2019, with pathology revealing high grade neuroendocrine tumor in 2/5 right common iliac nodes dissected. This was deemed to be recurrence of his prostate cancer, and he proceeded to undergo salvage radiotherapy with indefinite ADT as described above.     Mr. Stallworth tolerated radiotherapy relatively well, and by the end of his treatment, he developed expected side effect of increased urinary symptoms, and diarrhea. He also experienced occasional urinary incontinence, and bladder spasm when asked to hold a full bladder.  He returns via telephone for routine follow-up.   PSA on 2020 was <0.01.    On interview today, the patient complained of hemorrhoids but it is better with fiber. He is experiencing hot flashes several times a day. AUA Score: 3/35  LILY 0/25. No breast related complaint.     LABS:  Date  PSA   11/13/2020 <0.01   9/16/2020 Lupron 45 mg   5/8/2020 <0.01   3/18/2020 Lupron 45 mg   3/4/2020 Salvage Radiotherapy   12/17/2019 Lupron 22.5 mg started   12/17/2020 (pre-RT) 0.34   9/30/2019 0.2   8/29/2019 0.2   5/20/2019 0.1   2/14/2019 0.1   8/14/2018 <0.1   2/12/2018 <0.1   11/14/2017 (post-op) <0.1   8/7/2017 RARP   5/12/2017 2.7   6/10/2015 1.99   9/26/2014 1.64       IMPRESSION: No clinical evidence for recurrent disease at this time.     RECOMMENDATIONS:  Patient will followup in 6 months with PSA. We also discussed possible breast irradiation to prevent gynecomastia. The patient will think about it.    The Lupron needs to be long term as tolerated.    ABRAHAM Schreiber M.D.  Department of Radiation Oncology  [facility]      I spent 20 minutes preparing for the call and I spoke to the patient for 10 minutes

## 2020-11-13 DIAGNOSIS — E11.9 DIABETES MELLITUS (H): ICD-10-CM

## 2020-11-13 DIAGNOSIS — C61 MALIGNANT NEOPLASM OF PROSTATE (H): ICD-10-CM

## 2020-11-13 LAB
HBA1C MFR BLD: 6.1 % (ref 0–5.6)
PSA SERPL-MCNC: <0.01 UG/L (ref 0–4)

## 2020-11-13 PROCEDURE — 36415 COLL VENOUS BLD VENIPUNCTURE: CPT | Performed by: PATHOLOGY

## 2020-11-13 PROCEDURE — 84153 ASSAY OF PSA TOTAL: CPT | Performed by: PATHOLOGY

## 2020-11-13 PROCEDURE — 83036 HEMOGLOBIN GLYCOSYLATED A1C: CPT | Performed by: PATHOLOGY

## 2020-11-17 ENCOUNTER — VIRTUAL VISIT (OUTPATIENT)
Dept: RADIATION ONCOLOGY | Facility: CLINIC | Age: 65
End: 2020-11-17
Attending: RADIOLOGY
Payer: MEDICARE

## 2020-11-17 DIAGNOSIS — C61 MALIGNANT NEOPLASM OF PROSTATE (H): Primary | ICD-10-CM

## 2020-11-17 PROCEDURE — 99443 PR PHYSICIAN TELEPHONE EVALUATION 21-30 MIN: CPT | Mod: 95 | Performed by: RADIOLOGY

## 2020-11-17 NOTE — LETTER
2020         RE: Zander Stallworth  [address]  [place] [address]        Dear Colleague,    Thank you for referring your patient, Zander Stallworth, to the [facility] RADIATION ONCOLOGY. Please see a copy of my visit note below.          [facility], [place]  Radiation Oncology Follow-up Note  2020    Zander Stallworth   MRN: [id number]  : 1955     DISEASE TREATED: Recurrent high risk prostate cancer, high-grade neuroendocrine differentiation. [id number], Hallie 3+4=7, PSA=0.2.  Axumin PET/CT scan for LN+     INTERVAL SINCE COMPLETION OF RADIATION THERAPY: 8 months (completed on 3/4/2020)     TYPE OF RADIATION THERAPY ADMINISTERED: 7000 cGy in 35 fractions (4600cGy to LN + Boost)      HORMONAL THERAPY: Plan Indefinite, Lupron 22.5 mg given 2019, 45 mg given 3/18/2020, 2020     SUBJECTIVE/HPI:   Mr. Stallworth is a 63 year old man with prostate cancer, with a pre-op was PSA 2.7 with highest [place]'s score of 5+3=8. He was underwent RALP and BLND in 2017, with no evidence of EPE, SVI, federica involvement (0/3) and negative margins were achieved.     His post-op PSA was initially undetectable until it was 0.1 on 2/15/2019 and mariano up to 0.2 on 2019. Axumin PET/CT on 2019 showed FDG avidity in one right common iliac node and an right internal iliac node. He underwent repeat BLND on 2019, with pathology revealing high grade neuroendocrine tumor in 2/5 right common iliac nodes dissected. This was deemed to be recurrence of his prostate cancer, and he proceeded to undergo salvage radiotherapy with indefinite ADT as described above.     Mr. Stallworth tolerated radiotherapy relatively well, and by the end of his treatment, he developed expected side effect of increased urinary symptoms, and diarrhea. He also experienced occasional urinary incontinence, and bladder spasm when asked to hold a full bladder.  He returns via telephone for routine  "follow-up.   PSA on 5/8/2020 was <0.01.    On interview today, the patient complained of hemorrhoids but it is better with fiber. He is experiencing hot flashes several times a day. AUA Score: 3/35  LILY 0/25. No breast related complaint.     LABS:  Date PSA   11/13/2020 <0.01   9/16/2020 Lupron 45 mg   5/8/2020 <0.01   3/18/2020 Lupron 45 mg   3/4/2020 Salvage Radiotherapy   12/17/2019 Lupron 22.5 mg started   12/17/2020 (pre-RT) 0.34   9/30/2019 0.2   8/29/2019 0.2   5/20/2019 0.1   2/14/2019 0.1   8/14/2018 <0.1   2/12/2018 <0.1   11/14/2017 (post-op) <0.1   8/7/2017 RARP   5/12/2017 2.7   6/10/2015 1.99   9/26/2014 1.64       IMPRESSION: No clinical evidence for recurrent disease at this time.     RECOMMENDATIONS:  Patient will followup in 6 months with PSA. We also discussed possible breast irradiation to prevent gynecomastia. The patient will think about it.    The Lupron needs to be long term as tolerated.    ABRAHAM Schreiber M.D.  Department of Radiation Oncology  [facility]      I spent 20 minutes preparing for the call and I spoke to the patient for 10 minutes        Zander Stallworth is a 64 year old male who is being evaluated via a billable telephone visit.      The patient has been notified of following:     \"This telephone visit will be conducted via a call between you and your physician/provider. We have found that certain health care needs can be provided without the need for a physical exam.  This service lets us provide the care you need with a short phone conversation.  If a prescription is necessary we can send it directly to your pharmacy.  If lab work is needed we can place an order for that and you can then stop by our lab to have the test done at a later time.    Telephone visits are billed at different rates depending on your insurance coverage. During this emergency period, for some insurers they may be billed the same as an in-person visit.  Please reach out to your " "insurance provider with any questions.    If during the course of the call the physician/provider feels a telephone visit is not appropriate, you will not be charged for this service.\"    Patient has given verbal consent for Telephone visit?  Yes    What phone number would you like to be contacted at? [phone]        Phone call duration: 10 minutes      FOLLOW-UP VISIT    Patient Name: Zander Stallworth      : 1955     Age: 64 year old        ______________________________________________________________________________     Chief Complaint   Patient presents with     Cancer     8 month fup for rad therapy to prostate bed     There were no vitals taken for this visit.     Date Radiation Completed: 3/4/2020    Pain  Denies    Labs  Other Labs: Yes: PSA  2020   <0.01    Imaging  None        PSA:   PSA   Date Value Ref Range Status   2020 <0.01 0 - 4 ug/L Final     Comment:     Assay Method:  Chemiluminescence using Siemens Vista analyzer   2020 <0.01 0 - 4 ug/L Final     Comment:     Assay Method:  Chemiluminescence using Siemens Vista analyzer   2019 0.34 0 - 4 ug/L Final     Comment:     Assay Method:  Chemiluminescence using Siemens Vista analyzer     Testosterone Level:   Testosterone Total   Date Value Ref Range Status   2019 275 240 - 950 ng/dL Final     Comment:     This test was developed and its performance characteristics determined by the   [facility],  Special Chemistry Laboratory. It has   not been cleared or approved by the FDA. The laboratory is regulated under   CLIA as qualified to perform high-complexity testing. This test is used for   clinical purposes. It should not be regarded as investigational or for   research.         On-Study AUA Symptom Score (PQ)  AUA (American Urological Association) Symptom Score  1. Over the past month, how often have you had a sensation of not emptying your bladder completely after you finished urinating?: " Not at all  2. Over the past month, how often have you had to urinate again less than two hours after you finished urinating?: Less than 1 time in 5  3. Over the past month, how often have you found you stopped and started again several times when you urinated?: Not at all  4. Over the past month, how often have you found it difficult to postpone urination?: Less than 1 time in 5  5. Over the past month, how often have you had a weak urine stream?: Not at all  6. Over the past month, how often have you had to push or strain to begin urinating?: Not at all  7. Over the past month, how many times did you typically get up to urinate from the time you went to bed at night until the time you got up in the morning?: 1 time  AUA Score: 3    Diarrhea: 0- None    Constipation: 0- None      Other Appointments:     MD Name:  Appointment Date:    MD Name: Appointment Date:   MD Name: Appointment Date:   Other Appointment Notes:     Residual Radiation side effect: ED     Additional Instructions: using a sitz bath for hemorrhoids    Nurse face-to-face time: Level 3:  10 min face to face time            Again, thank you for allowing me to participate in the care of your patient.        Sincerely,        Doug Schreiber MD

## 2020-11-17 NOTE — PROGRESS NOTES
"Zander Stallworth is a 64 year old male who is being evaluated via a billable telephone visit.      The patient has been notified of following:     \"This telephone visit will be conducted via a call between you and your physician/provider. We have found that certain health care needs can be provided without the need for a physical exam.  This service lets us provide the care you need with a short phone conversation.  If a prescription is necessary we can send it directly to your pharmacy.  If lab work is needed we can place an order for that and you can then stop by our lab to have the test done at a later time.    Telephone visits are billed at different rates depending on your insurance coverage. During this emergency period, for some insurers they may be billed the same as an in-person visit.  Please reach out to your insurance provider with any questions.    If during the course of the call the physician/provider feels a telephone visit is not appropriate, you will not be charged for this service.\"    Patient has given verbal consent for Telephone visit?  Yes    What phone number would you like to be contacted at? [phone]        Phone call duration: 10 minutes      FOLLOW-UP VISIT    Patient Name: Zander Stallworth      : 1955     Age: 64 year old        ______________________________________________________________________________     Chief Complaint   Patient presents with     Cancer     8 month fup for rad therapy to prostate bed     There were no vitals taken for this visit.     Date Radiation Completed: 3/4/2020    Pain  Denies    Labs  Other Labs: Yes: PSA  2020   <0.01    Imaging  None        PSA:   PSA   Date Value Ref Range Status   2020 <0.01 0 - 4 ug/L Final     Comment:     Assay Method:  Chemiluminescence using Siemens Vista analyzer   2020 <0.01 0 - 4 ug/L Final     Comment:     Assay Method:  Chemiluminescence using Siemens Vista analyzer   2019 0.34 0 - 4 ug/L Final     " Comment:     Assay Method:  Chemiluminescence using Siemens Vista analyzer     Testosterone Level:   Testosterone Total   Date Value Ref Range Status   12/17/2019 275 240 - 950 ng/dL Final     Comment:     This test was developed and its performance characteristics determined by the   [facility],  Special Chemistry Laboratory. It has   not been cleared or approved by the FDA. The laboratory is regulated under   CLIA as qualified to perform high-complexity testing. This test is used for   clinical purposes. It should not be regarded as investigational or for   research.         On-Study AUA Symptom Score (PQ)  AUA (American Urological Association) Symptom Score  1. Over the past month, how often have you had a sensation of not emptying your bladder completely after you finished urinating?: Not at all  2. Over the past month, how often have you had to urinate again less than two hours after you finished urinating?: Less than 1 time in 5  3. Over the past month, how often have you found you stopped and started again several times when you urinated?: Not at all  4. Over the past month, how often have you found it difficult to postpone urination?: Less than 1 time in 5  5. Over the past month, how often have you had a weak urine stream?: Not at all  6. Over the past month, how often have you had to push or strain to begin urinating?: Not at all  7. Over the past month, how many times did you typically get up to urinate from the time you went to bed at night until the time you got up in the morning?: 1 time  AUA Score: 3    Diarrhea: 0- None    Constipation: 0- None      Other Appointments:     MD Name:  Appointment Date:    MD Name: Appointment Date:   MD Name: Appointment Date:   Other Appointment Notes:     Residual Radiation side effect: ED     Additional Instructions: using a sitz bath for hemorrhoids    Nurse face-to-face time: Level 3:  10 min face to face time

## 2020-12-16 ENCOUNTER — VIRTUAL VISIT (OUTPATIENT)
Dept: ONCOLOGY | Facility: CLINIC | Age: 65
End: 2020-12-16
Attending: INTERNAL MEDICINE
Payer: MEDICARE

## 2020-12-16 DIAGNOSIS — C61 MALIGNANT NEOPLASM OF PROSTATE (H): Primary | ICD-10-CM

## 2020-12-16 PROCEDURE — 999N001193 HC VIDEO/TELEPHONE VISIT; NO CHARGE

## 2020-12-16 PROCEDURE — 99442 PR PHYSICIAN TELEPHONE EVALUATION 11-20 MIN: CPT | Mod: 95 | Performed by: INTERNAL MEDICINE

## 2020-12-16 NOTE — PROGRESS NOTES
"MEDICAL ONCOLOGY VIRTUAL VISIT NOTE    REFERRING PROVIDER: Dr. Ronquillo ([facility])  RADIATION ONCOLOGIST: Doug Schreiber MD    REASON FOR CURRENT VISIT: Recurrent prostate cancer    HISTORY OF PRESENT ILLNESS: Mr. Zander Stallworth is a 65 year old gentleman who was originally referred by Dr. Ronquillo of Urology for evaluation of rising PSA and concern for metastatic prostate cancer. He was diagnosed with prostate cancer initially in 2017 after an abnormal FIDE. TRUS with biopsy revealed Hallie 5+3=8 prostate adenocarcinoma in 9 of 12 cores (invovling 60% of the tissue), with perineural invasion. PSA was 2.7 at that time. CT and bone scan showed no evidence of metastatic disease. He underwent radical prostatectomy on 8/7/17, pathology consistent with Hallie 3+4, [id number]. PSA was undetectable after surgery, until February 2019 when PSA was found to be 0.1, up to 0.2 on 8/29/19 and stable at 0.2 on 9/30/19. PET/CT on 9/20/19 revealed findings suspicious for metastatic prostate cancer involving hypermetabolic lymph nodes measuring 0.8 x 0.6 cm in the right internal iliac chain along the right pelvic sidewall and 0.5 x 0.4 cm in the right common iliac chain just anterior to the L5 vertebral body. He was seen by Dr. Ronquillo in urology on 10/3/19 and discussed treatment options including radiation, hormone therapy and pelvic lymph node dissection.    He then underwent a bilateral robot-assisted laparoscopic pelvic lymph node dissection with Dr. Ronquillo on 11/19/19 and pathology showed - \"A. Lymph nodes, right pelvic, excision: - Seven benign lymph nodes (0/7)  B. Lymph nodes, right iliac, excision: - Six benign lymph nodes (0/6)  C. Lymph nodes, right common iliac, excision: - Two of five lymph nodes positive for metastatic prostatic adenocarcinoma (2/5); please see comment - Largest metastatic deposit: 1.1 cm - Extranodal extension is present - D. Lymph nodes, right deep obturator, excision: - Benign soft tissue - No lymph nodes " "identified E. Lymph nodes, left pelvic, excision: - Three benign lymph nodes (0/3).  COMMENT: Immunohistochemical stains show the tumor is positive for synaptophysin. Chromogranin and CD56 show scattered reactivity. While not confirmatory, these results suggest high grade neuroendocrine differentiation.\" Case was reviewed in  tumor board and recommendation made to give ADT+pelvic RT.    He started indefinite ADT with Lupron on 12/17/2019 and received salvage radiotherapy with Dr. Schreiber around March 2020. Has done well with RT and except for occasional urinary incontinence and low LILY score, has no other issues to report. Has mild hot flashes, mild loss of muscle mass, and gynecomastia with ADT. He has no signs/symptoms of disease progression. He is retired and maintains a very active lifestyle, walking up to 5 miles per day.     REVIEW OF SYSTEMS:  A full 14 point ROS was negative other than the symptoms noted above in the HPI.    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     Hypercholesterolemia 11/27/2009     Lesion of plantar nerve 04/02/2015     Malabsorption of glucose 11/18/2009     Malignant neoplasm of prostate (H) 06/13/2017     Metatarsalgia 04/02/2015     Presbyopia 11/09/2010     Sensorineural hearing loss, bilateral 06/14/2012     Prostate cancer (H) 11/07/2019     Resolved Ambulatory Problems     Diagnosis Date Noted     No Resolved Ambulatory Problems     Past Medical History:   Diagnosis Date     Allergic rhinitis      DM (diabetes mellitus) (H)      Hearing loss      HLD (hyperlipidemia)      PAST SURGICAL HISTORY:  Past Surgical History:   Procedure Laterality Date     DAVINCI LYMPHADENECTOMY N/A 11/19/2019    Procedure: davinci assisted bilateral pelvic lymph node dissection;  Surgeon: Ousmane Ronquillo, " MD;  Location: [facility] OR     parotid gland removed  2015    parotidectomy      PROSTATE SURGERY  08/07/2017    radical prostatectomy with bilateral pelvic lymph node dissection      SOCIAL HISTORY:   Social History     Tobacco Use     Smoking status: Never Smoker     Smokeless tobacco: Never Used   Substance Use Topics     Alcohol use: Yes     Comment: 10 drinks/week     Drug use: Never     FAMILY HISTORY:  - Father had dementia in his late 60s, as did his paternal grandfather.  - Father had colon cancer and later lung cancer.    ALLERGIES:  Allergies   Allergen Reactions     Amoxicillin-Pot Clavulanate      Itching      Erythromycin      Sildenafil      Visual changes with sildenafil.      CURRENT MEDICATIONS:  Current Outpatient Medications:      fexofenadine (ALLEGRA) 60 MG tablet, Take 60 mg by mouth every morning , Disp: , Rfl:      fluticasone (FLONASE) 50 MCG/ACT nasal spray, Spray 2 sprays into both nostrils daily , Disp: , Rfl:      hydrocortisone (ANUCORT-HC) 25 MG suppository, Place 25 mg rectally 2 times daily PRN, Disp: , Rfl:      metFORMIN (GLUCOPHAGE) 500 MG tablet, Take 500 mg by mouth daily (with breakfast) , Disp: , Rfl:      Multiple Vitamins-Minerals (CENTRUM SILVER PO), Take by mouth every morning , Disp: , Rfl:      simvastatin (ZOCOR) 10 MG tablet, At Bedtime , Disp: , Rfl:     PHYSICAL EXAMINATION:  Virtual visit.    LABORATORY DATA:   PSA <0.01 <0.01 0.34   CGAB  --   --  52   TESTOSTTOTAL  --   --  275   CGAB - Chromogranin A; TESTOSTTOTAL: Total testosterone.    IMAGING STUDIES:  Reviewed Axumin PET Scan from 9/20/19:  - Notable only for two fluciclovine-avid lymph nodes measuring 0.8 x 0.6 cm in the right internal iliac chain along the right pelvic side wall and 0.5 x 0.4 cm in the right common iliac chain just anterior to the L5 vertebral body.  - Findings suspicious for recurrent prostate cancer involving the pelvic lymph nodes.    ASSESSMENT AND PLAN: Mr. Stallworth is a 65 year old man with  locally recurrent prostate cancer, here for a follow-up.     1. Locally-recurrent prostate cancer:   - Pt completed salvage RT and now on ADT for locally-recurrent prostate ca.   - Tolerating well and will get next dose of lupron tomorrow. Discussed the option of 6-monthly dose. Pt asked to review this with Rad Onc team.   - Concur with 2 years (or more) of planned ADT per Dr. Schreiber.  - Recommended PSA every 3 months for next ~2 years. PSMA PET scan if PSA rises.  - Primary onc mgmt per Dr. Schreiber and keep f/up with me in Dec 2021.     2. Muscle loss:   - Had joined a gym through health plan but unable to access.   - Discussed resistance exercises. Continue doing 1377-1167 steps a day.     3. Gynecomastia:  - Discussed medical versus RT options for treatment. Pt wants to hold off.     RTC 1 year to follow-up.     BILLIN - 16 mins      Abelardo Hoang M.D.  . Professor of Medicine  Genitourinary Oncology  Division of Hematology, Oncology & Transplantation  [facility]

## 2020-12-16 NOTE — LETTER
"    12/16/2020         RE: Zander Stallworth  [address]  [place] [address]        Dear Colleague,    Thank you for referring your patient, Zander Stallworth, to the [facility]. Please see a copy of my visit note below.    Zander Stallworth is a 65 year old male who is being evaluated via a billable telephone visit.      The patient has been notified of following:     \"This telephone visit will be conducted via a call between you and your physician/provider. We have found that certain health care needs can be provided without the need for a physical exam.  This service lets us provide the care you need with a short phone conversation.  If a prescription is necessary we can send it directly to your pharmacy.  If lab work is needed we can place an order for that and you can then stop by our lab to have the test done at a later time.    Telephone visits are billed at different rates depending on your insurance coverage. During this emergency period, for some insurers they may be billed the same as an in-person visit.  Please reach out to your insurance provider with any questions.    If during the course of the call the physician/provider feels a telephone visit is not appropriate, you will not be charged for this service.\"    Patient has given verbal consent for Telephone visit?  Yes    What phone number would you like to be contacted at? [phone]    How would you like to obtain your AVS? MyChart    Vitals - Patient Reported  Weight (Patient Reported): 87.5 kg (193 lb)  Height (Patient Reported): 177.8 cm (5' 10\")  BMI (Based on Pt Reported Ht/Wt): 27.69  Pain Score: No Pain (0)    Jamee Pavon CMA December 16, 2020  2:23 PM     Phone call duration: 16 minutes    Abelardo Hoang MD        MEDICAL ONCOLOGY VIRTUAL VISIT NOTE    REFERRING PROVIDER: Dr. Ronquillo ([facility])  RADIATION ONCOLOGIST: Doug Schreiber MD    REASON FOR CURRENT VISIT: Recurrent prostate cancer    HISTORY OF PRESENT " "ILLNESS: Mr. Zander Stallworth is a 65 year old gentleman who was originally referred by Dr. Ronquillo of Urology for evaluation of rising PSA and concern for metastatic prostate cancer. He was diagnosed with prostate cancer initially in 2017 after an abnormal FIDE. TRUS with biopsy revealed Hallie 5+3=8 prostate adenocarcinoma in 9 of 12 cores (invovling 60% of the tissue), with perineural invasion. PSA was 2.7 at that time. CT and bone scan showed no evidence of metastatic disease. He underwent radical prostatectomy on 8/7/17, pathology consistent with Hallie 3+4, [id number]. PSA was undetectable after surgery, until February 2019 when PSA was found to be 0.1, up to 0.2 on 8/29/19 and stable at 0.2 on 9/30/19. PET/CT on 9/20/19 revealed findings suspicious for metastatic prostate cancer involving hypermetabolic lymph nodes measuring 0.8 x 0.6 cm in the right internal iliac chain along the right pelvic sidewall and 0.5 x 0.4 cm in the right common iliac chain just anterior to the L5 vertebral body. He was seen by Dr. Ronquillo in urology on 10/3/19 and discussed treatment options including radiation, hormone therapy and pelvic lymph node dissection.    He then underwent a bilateral robot-assisted laparoscopic pelvic lymph node dissection with Dr. Ronquillo on 11/19/19 and pathology showed - \"A. Lymph nodes, right pelvic, excision: - Seven benign lymph nodes (0/7)  B. Lymph nodes, right iliac, excision: - Six benign lymph nodes (0/6)  C. Lymph nodes, right common iliac, excision: - Two of five lymph nodes positive for metastatic prostatic adenocarcinoma (2/5); please see comment - Largest metastatic deposit: 1.1 cm - Extranodal extension is present - D. Lymph nodes, right deep obturator, excision: - Benign soft tissue - No lymph nodes identified E. Lymph nodes, left pelvic, excision: - Three benign lymph nodes (0/3).  COMMENT: Immunohistochemical stains show the tumor is positive for synaptophysin. Chromogranin and CD56 show " "scattered reactivity. While not confirmatory, these results suggest high grade neuroendocrine differentiation.\" Case was reviewed in  tumor board and recommendation made to give ADT+pelvic RT.    He started indefinite ADT with Lupron on 12/17/2019 and received salvage radiotherapy with Dr. Schreiber around March 2020. Has done well with RT and except for occasional urinary incontinence and low LILY score, has no other issues to report. Has mild hot flashes, mild loss of muscle mass, and gynecomastia with ADT. He has no signs/symptoms of disease progression. He is retired and maintains a very active lifestyle, walking up to 5 miles per day.     REVIEW OF SYSTEMS:  A full 14 point ROS was negative other than the symptoms noted above in the HPI.    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     Hypercholesterolemia 11/27/2009     Lesion of plantar nerve 04/02/2015     Malabsorption of glucose 11/18/2009     Malignant neoplasm of prostate (H) 06/13/2017     Metatarsalgia 04/02/2015     Presbyopia 11/09/2010     Sensorineural hearing loss, bilateral 06/14/2012     Prostate cancer (H) 11/07/2019     Resolved Ambulatory Problems     Diagnosis Date Noted     No Resolved Ambulatory Problems     Past Medical History:   Diagnosis Date     Allergic rhinitis      DM (diabetes mellitus) (H)      Hearing loss      HLD (hyperlipidemia)      PAST SURGICAL HISTORY:  Past Surgical History:   Procedure Laterality Date     DAVINCI LYMPHADENECTOMY N/A 11/19/2019    Procedure: davinci assisted bilateral pelvic lymph node dissection;  Surgeon: Ousmane Ronquillo MD;  Location: [facility] OR     parotid gland removed  2015    parotidectomy      PROSTATE SURGERY  08/07/2017    radical prostatectomy with bilateral pelvic lymph node dissection      SOCIAL HISTORY: "   Social History     Tobacco Use     Smoking status: Never Smoker     Smokeless tobacco: Never Used   Substance Use Topics     Alcohol use: Yes     Comment: 10 drinks/week     Drug use: Never     FAMILY HISTORY:  - Father had dementia in his late 60s, as did his paternal grandfather.  - Father had colon cancer and later lung cancer.    ALLERGIES:  Allergies   Allergen Reactions     Amoxicillin-Pot Clavulanate      Itching      Erythromycin      Sildenafil      Visual changes with sildenafil.      CURRENT MEDICATIONS:  Current Outpatient Medications:      fexofenadine (ALLEGRA) 60 MG tablet, Take 60 mg by mouth every morning , Disp: , Rfl:      fluticasone (FLONASE) 50 MCG/ACT nasal spray, Spray 2 sprays into both nostrils daily , Disp: , Rfl:      hydrocortisone (ANUCORT-HC) 25 MG suppository, Place 25 mg rectally 2 times daily PRN, Disp: , Rfl:      metFORMIN (GLUCOPHAGE) 500 MG tablet, Take 500 mg by mouth daily (with breakfast) , Disp: , Rfl:      Multiple Vitamins-Minerals (CENTRUM SILVER PO), Take by mouth every morning , Disp: , Rfl:      simvastatin (ZOCOR) 10 MG tablet, At Bedtime , Disp: , Rfl:     PHYSICAL EXAMINATION:  Virtual visit.    LABORATORY DATA:   PSA <0.01 <0.01 0.34   CGAB  --   --  52   TESTOSTTOTAL  --   --  275   CGAB - Chromogranin A; TESTOSTTOTAL: Total testosterone.    IMAGING STUDIES:  Reviewed Axumin PET Scan from 9/20/19:  - Notable only for two fluciclovine-avid lymph nodes measuring 0.8 x 0.6 cm in the right internal iliac chain along the right pelvic side wall and 0.5 x 0.4 cm in the right common iliac chain just anterior to the L5 vertebral body.  - Findings suspicious for recurrent prostate cancer involving the pelvic lymph nodes.    ASSESSMENT AND PLAN: Mr. Stallworth is a 65 year old man with locally recurrent prostate cancer, here for a follow-up.     1. Locally-recurrent prostate cancer:   - Pt completed salvage RT and now on ADT for locally-recurrent prostate ca.   - Tolerating  well and will get next dose of lupron tomorrow. Discussed the option of 6-monthly dose. Pt asked to review this with Rad Onc team.   - Concur with 2 years (or more) of planned ADT per Dr. Schreiber.  - Recommended PSA every 3 months for next ~2 years. PSMA PET scan if PSA rises.  - Primary onc mgmt per Dr. Shcreiber and keep f/up with me in Dec 2021.     2. Muscle loss:   - Had joined a gym through health plan but unable to access.   - Discussed resistance exercises. Continue doing [id number] steps a day.     3. Gynecomastia:  - Discussed medical versus RT options for treatment. Pt wants to hold off.     RTC 1 year to follow-up.     BILLIN - 41 mins      Abelardo Hoang M.D.  . Professor of Medicine  Genitourinary Oncology  Division of Hematology, Oncology & Transplantation  [facility]

## 2020-12-16 NOTE — PROGRESS NOTES
"Zander Stallworth is a 65 year old male who is being evaluated via a billable telephone visit.      The patient has been notified of following:     \"This telephone visit will be conducted via a call between you and your physician/provider. We have found that certain health care needs can be provided without the need for a physical exam.  This service lets us provide the care you need with a short phone conversation.  If a prescription is necessary we can send it directly to your pharmacy.  If lab work is needed we can place an order for that and you can then stop by our lab to have the test done at a later time.    Telephone visits are billed at different rates depending on your insurance coverage. During this emergency period, for some insurers they may be billed the same as an in-person visit.  Please reach out to your insurance provider with any questions.    If during the course of the call the physician/provider feels a telephone visit is not appropriate, you will not be charged for this service.\"    Patient has given verbal consent for Telephone visit?  Yes    What phone number would you like to be contacted at? [phone]    How would you like to obtain your AVS? MyChart    Vitals - Patient Reported  Weight (Patient Reported): 87.5 kg (193 lb)  Height (Patient Reported): 177.8 cm (5' 10\")  BMI (Based on Pt Reported Ht/Wt): 27.69  Pain Score: No Pain (0)    Jamee Pavon CMA December 16, 2020  2:23 PM     Phone call duration: 16 minutes    Abelardo Hoang MD      "

## 2021-03-14 ENCOUNTER — HEALTH MAINTENANCE LETTER (OUTPATIENT)
Age: 66
End: 2021-03-14

## 2021-04-02 ENCOUNTER — ONCOLOGY VISIT (OUTPATIENT)
Dept: ONCOLOGY | Facility: CLINIC | Age: 66
End: 2021-04-02
Attending: RADIOLOGY
Payer: MEDICARE

## 2021-04-02 VITALS
HEART RATE: 63 BPM | SYSTOLIC BLOOD PRESSURE: 117 MMHG | WEIGHT: 207 LBS | TEMPERATURE: 98.1 F | DIASTOLIC BLOOD PRESSURE: 72 MMHG | BODY MASS INDEX: 29.7 KG/M2 | OXYGEN SATURATION: 99 %

## 2021-04-02 DIAGNOSIS — C61 MALIGNANT NEOPLASM OF PROSTATE (H): Primary | ICD-10-CM

## 2021-04-02 PROCEDURE — 250N000011 HC RX IP 250 OP 636: Performed by: RADIOLOGY

## 2021-04-02 PROCEDURE — 96402 CHEMO HORMON ANTINEOPL SQ/IM: CPT

## 2021-04-02 RX ADMIN — LEUPROLIDE ACETATE 45 MG: KIT at 11:29

## 2021-04-02 ASSESSMENT — PAIN SCALES - GENERAL: PAINLEVEL: NO PAIN (0)

## 2021-04-02 NOTE — LETTER
2021         RE: Zander Stallworth  [address]  [place] [address]        Dear Colleague,    Thank you for referring your patient, Zander Stallworth, to the [facility]. Please see a copy of my visit note below.    Patient presents to the [facility] for leuprolide (LUPRON DEPOT) kit 45 mg. Order written by Dr. Hoang was completed today. Name and  were verified by patient. See MAR for medication details. Patient was asked if they have any new symptoms or questions/concerns. Medication was given in the following site: left ventral gluteal. Patient tolerated injection well and was discharged to home.    -Patti ANN CMA      Again, thank you for allowing me to participate in the care of your patient.        Sincerely,        [facility]

## 2021-04-02 NOTE — PROGRESS NOTES
Patient presents to the [facility] for leuprolide (LUPRON DEPOT) kit 45 mg. Order written by Dr. Hoang was completed today. Name and  were verified by patient. See MAR for medication details. Patient was asked if they have any new symptoms or questions/concerns. Medication was given in the following site: left ventral gluteal. Patient tolerated injection well and was discharged to home.    -Patti ANN, CMA

## 2021-04-02 NOTE — LETTER
2021      RE: Zander Stallworth  [address]  [place] [address]       Patient presents to the [facility] for leuprolide (LUPRON DEPOT) kit 45 mg. Order written by Dr. Hoang was completed today. Name and  were verified by patient. See MAR for medication details. Patient was asked if they have any new symptoms or questions/concerns. Medication was given in the following site: left ventral gluteal. Patient tolerated injection well and was discharged to home.    -Patti ANN CMA      [facility]

## 2021-04-28 DIAGNOSIS — E11.9 TYPE 2 DIABETES MELLITUS WITHOUT COMPLICATION, WITHOUT LONG-TERM CURRENT USE OF INSULIN (H): Primary | ICD-10-CM

## 2021-04-29 DIAGNOSIS — C61 MALIGNANT NEOPLASM OF PROSTATE (H): Primary | ICD-10-CM

## 2021-05-03 DIAGNOSIS — E11.9 TYPE 2 DIABETES MELLITUS WITHOUT COMPLICATION, WITHOUT LONG-TERM CURRENT USE OF INSULIN (H): ICD-10-CM

## 2021-05-03 DIAGNOSIS — C61 MALIGNANT NEOPLASM OF PROSTATE (H): ICD-10-CM

## 2021-05-03 LAB
HBA1C MFR BLD: 6.9 % (ref 0–5.6)
PSA SERPL-MCNC: <0.01 UG/L (ref 0–4)

## 2021-05-03 PROCEDURE — 83036 HEMOGLOBIN GLYCOSYLATED A1C: CPT | Performed by: PATHOLOGY

## 2021-05-03 PROCEDURE — 36415 COLL VENOUS BLD VENIPUNCTURE: CPT | Performed by: PATHOLOGY

## 2021-05-03 PROCEDURE — 84153 ASSAY OF PSA TOTAL: CPT | Performed by: PATHOLOGY

## 2021-05-09 ENCOUNTER — HEALTH MAINTENANCE LETTER (OUTPATIENT)
Age: 66
End: 2021-05-09

## 2021-05-10 NOTE — PROGRESS NOTES
[facility] [place]  Radiation Oncology Follow-up Note  May 12, 2021    Zander Stallworth   MRN: [id number]  : 1955     DISEASE TREATED: Recurrent high risk prostate cancer, high-grade neuroendocrine differentiation. [id number], Carmel 3+4=7, PSA=0.2.  Axumin PET/CT scan for LN+     INTERVAL SINCE COMPLETION OF RADIATION THERAPY: 14 months (completed on 3/4/2020)     TYPE OF RADIATION THERAPY ADMINISTERED: 7000 cGy in 35 fractions (4600cGy to LN + Boost)      HORMONAL THERAPY: Plan Indefinite. Lupron 22.5 mg given 2019, 45 mg given 3/18/2020, 2020, 2021)     SUBJECTIVE: Mr. Stallworth is a 65 year old man with biochemically and nodally recurrent prostate cancer. His pre-op PSA was 2.7, with highest Hallie's score of 5+3=8. He underwent upfront treatment with RALP and BLND in 2017. There was no evidence of EPE SVI, federica involvement (0/3); and negative margins were achieved.     His post-op PSA was initially undetectable. It mariano to a value of 0.1 on 2/15/2019, and increased to 0.2 on 2019. Axumin PET/CT on 2019 then showed FDG avidity in one right common iliac node and an right internal iliac node. He underwent repeat PLND on 2019, with pathology revealing high grade neuroendocrine tumor in 2/5 right common iliac nodes dissected. This was deemed to be recurrence of his prostate cancer, and he proceeded to undergo salvage radiotherapy with indefinite ADT as described above.     Mr. Stallworth tolerated radiotherapy relatively well, developing only the expected side effect of increased urinary symptoms and diarrhea. He has since had significant interval recovery from these symptom and returns via telephone for routine follow-up.  He was seen in follow-up with his medical oncologist, Dr. Hoang, on 2020, where the patient complained of gynecomastia but elected to forego any treatment for it. His last 6m lupron deposition was on 2021. His most recent PSA  from 5/03/2021 remains undetectable.    On interview today, the patient states that he is generally well but over the past 6 monthshas developed worsening stress urinary incontinence. He uses between 2-3 pads a day, and believes his incontinence is worsened by exercise. He is not performing Kegel exercises. He is experiencing hot flashes several times a day. AUA Score: 7/35 (previously 3/35), (LILY previously 0/25). He also complains of recent weight gain and continuing enlargement of his bilateral breast tissue.    OBJECTIVE: A/Ox3. NAD. Pleasant sounding on the phone  PSA    05/03/2021 <0.01   4/2/2021 Lupron 45 mg   11/13/2020 <0.01   9/16/2020 Lupron 45 mg   5/8/2020 <0.01   3/18/2020 Lupron 45 mg   3/4/2020 Salvage Radiotherapy   12/17/2019 Lupron 22.5 mg started   12/17/2020 (pre-RT) 0.34   9/30/2019 0.2   8/29/2019 0.2   5/20/2019 0.1   2/14/2019 0.1   8/14/2018 <0.1   2/12/2018 <0.1   11/14/2017 (post-op) <0.1   8/7/2017 RARP   5/12/2017 2.7   6/10/2015 1.99   9/26/2014 1.64     IMPRESSION: PSA remains undetectable. No clinical evidence for recurrent disease at this time. ADT continues to be moderately well tolerated, but the patient expresses concerns of worsening urinary incontinence and progressive breast enlargement.     RECOMMENDATIONS:  We again discussed possible breast irradiation to prevent further gynecomastia, and the patient will contact our office in the next several months to arrange follow-up and breast exam (if he would like to proceed with breast radiotherapy).    PSA in 6m. The current plan is for indefinite Lupron, as long as it can be tolerated. We may elect to trial the patient on intermittent ADT if his symptoms worsen and become unmanageable.    We recommended re-inittiation of Kegel exercises. We will evaluate the patient at next visit, and refer him to urology services for consideration of mechanical continence assistive measures if appropriate.    Owen Herman MD-PhD  PGY-5  Chief Radiation Oncology Resident, [facility]  [phone]    Telephone time: 22 minutes. 29 minutes was spent in preparing for, documenting and reviewing labs/imaging for today's visit    I talked to the patient with the resident.  I agree with the resident's note and plan of care.      ABRAHAM Schreiber M.D.  Department of Radiation Oncology  [facility]

## 2021-05-12 ENCOUNTER — VIRTUAL VISIT (OUTPATIENT)
Dept: RADIATION ONCOLOGY | Facility: CLINIC | Age: 66
End: 2021-05-12
Attending: RADIOLOGY
Payer: MEDICARE

## 2021-05-12 DIAGNOSIS — C61 MALIGNANT NEOPLASM OF PROSTATE (H): ICD-10-CM

## 2021-05-12 PROCEDURE — G0463 HOSPITAL OUTPT CLINIC VISIT: HCPCS | Mod: TEL | Performed by: RADIOLOGY

## 2021-05-12 NOTE — PROGRESS NOTES
FOLLOW-UP VISIT    Patient Name: Zander Stallworth      : 1955     Age: 65 year old        ______________________________________________________________________________     Chief Complaint   Patient presents with     Cancer     14 month fup for rad therapy to prostate bed     There were no vitals taken for this visit.     Date Radiation Completed: 3/4/2020    Pain  Denies    Labs  Other Labs: Yes: PSA 5/3/2021 <0.01    Imaging  None        PSA:   PSA   Date Value Ref Range Status   2021 <0.01 0 - 4 ug/L Final     Comment:     Assay Method:  Chemiluminescence using Siemens Vista analyzer   2020 <0.01 0 - 4 ug/L Final     Comment:     Assay Method:  Chemiluminescence using Siemens Vista analyzer   2020 <0.01 0 - 4 ug/L Final     Comment:     Assay Method:  Chemiluminescence using Siemens Vista analyzer   2019 0.34 0 - 4 ug/L Final     Comment:     Assay Method:  Chemiluminescence using Siemens Vista analyzer     Testosterone Level:   Testosterone Total   Date Value Ref Range Status   2019 275 240 - 950 ng/dL Final     Comment:     This test was developed and its performance characteristics determined by the   [facility],  [facility]. It has   not been cleared or approved by the FDA. The laboratory is regulated under   CLIA as qualified to perform high-complexity testing. This test is used for   clinical purposes. It should not be regarded as investigational or for   research.         On-Study AUA Symptom Score (PQ)  AUA (American Urological Association) Symptom Score  1. Over the past month, how often have you had a sensation of not emptying your bladder completely after you finished urinating?: Not at all  2. Over the past month, how often have you had to urinate again less than two hours after you finished urinating?: More than half the time  3. Over the past month, how often have you found you stopped and started again several times when  you urinated?: Not at all  4. Over the past month, how often have you found it difficult to postpone urination?: Less than half the time  5. Over the past month, how often have you had a weak urine stream?: Not at all  6. Over the past month, how often have you had to push or strain to begin urinating?: Not at all  7. Over the past month, how many times did you typically get up to urinate from the time you went to bed at night until the time you got up in the morning?: 1 time  AUA Score: 7    Diarrhea: 0- None    Constipation: 0- None      Other Appointments:     MD Name:  Appointment Date:    MD Name: Appointment Date:   MD Name: Appointment Date:   Other Appointment Notes:     Residual Radiation side effect: occ urinary incontinence  Using 1 pad/day    Additional Instructions:     Nurse face-to-face time: Level 4:  15 min face to face time

## 2021-05-12 NOTE — LETTER
2021         RE: Zander Stallworth  [address]  [place] [address]        Dear Colleague,    Thank you for referring your patient, Zander Stallworth, to the [facility] RADIATION ONCOLOGY. Please see a copy of my visit note below.    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 [id number], Hallie 3+4=7, PSA=0.2.  Axumin PET/CT scan for LN+     INTERVAL SINCE COMPLETION OF RADIATION THERAPY: 14 months (completed on 3/4/2020)     TYPE OF RADIATION THERAPY ADMINISTERED: 7000 cGy in 35 fractions (4600cGy to LN + Boost)      HORMONAL THERAPY: Plan Indefinite. Lupron 22.5 mg given 2019, 45 mg given 3/18/2020, 2020, 2021)     SUBJECTIVE: Mr. Stallworth is a 65 year old man with biochemically and nodally recurrent prostate cancer. His pre-op PSA was 2.7, with highest Hallie's score of 5+3=8. He underwent upfront treatment with RALP and BLND in 2017. There was no evidence of EPE SVI, federica involvement (0/3); and negative margins were achieved.     His post-op PSA was initially undetectable. It mariano to a value of 0.1 on 2/15/2019, and increased to 0.2 on 2019. Axumin PET/CT on 2019 then showed FDG avidity in one right common iliac node and an right internal iliac node. He underwent repeat PLND on 2019, with pathology revealing high grade neuroendocrine tumor in 2/5 right common iliac nodes dissected. This was deemed to be recurrence of his prostate cancer, and he proceeded to undergo salvage radiotherapy with indefinite ADT as described above.     Mr. Stallworth tolerated radiotherapy relatively well, developing only the expected side effect of increased urinary symptoms and diarrhea. He has since had significant interval recovery from these symptom and returns via telephone for  routine follow-up.  He was seen in follow-up with his medical oncologist, Dr. Hoang, on 12/16/2020, where the patient complained of gynecomastia but elected to forego any treatment for it. His last 6m lupron deposition was on 4/2/2021. His most recent PSA from 5/03/2021 remains undetectable.    On interview today, the patient states that he is generally well but over the past 6 monthshas developed worsening stress urinary incontinence. He uses between 2-3 pads a day, and believes his incontinence is worsened by exercise. He is not performing Kegel exercises. He is experiencing hot flashes several times a day. AUA Score: 7/35 (previously 3/35), (LILY previously 0/25). He also complains of recent weight gain and continuing enlargement of his bilateral breast tissue.    OBJECTIVE: A/Ox3. NAD. Pleasant sounding on the phone  PSA    05/03/2021 <0.01   4/2/2021 Lupron 45 mg   11/13/2020 <0.01   9/16/2020 Lupron 45 mg   5/8/2020 <0.01   3/18/2020 Lupron 45 mg   3/4/2020 Salvage Radiotherapy   12/17/2019 Lupron 22.5 mg started   12/17/2020 (pre-RT) 0.34   9/30/2019 0.2   8/29/2019 0.2   5/20/2019 0.1   2/14/2019 0.1   8/14/2018 <0.1   2/12/2018 <0.1   11/14/2017 (post-op) <0.1   8/7/2017 RARP   5/12/2017 2.7   6/10/2015 1.99   9/26/2014 1.64     IMPRESSION: PSA remains undetectable. No clinical evidence for recurrent disease at this time. ADT continues to be moderately well tolerated, but the patient expresses concerns of worsening urinary incontinence and progressive breast enlargement.     RECOMMENDATIONS:  We again discussed possible breast irradiation to prevent further gynecomastia, and the patient will contact our office in the next several months to arrange follow-up and breast exam (if he would like to proceed with breast radiotherapy).    PSA in 6m. The current plan is for indefinite Lupron, as long as it can be tolerated. We may elect to trial the patient on intermittent ADT if his symptoms worsen and become  unmanageable.    We recommended re-inittiation of Kegel exercises. We will evaluate the patient at next visit, and refer him to urology services for consideration of mechanical continence assistive measures if appropriate.    Owen Herman MD-PhD PGY-5  Chief Radiation Oncology Resident, [facility]  [phone]    Telephone time: 22 minutes. 29 minutes was spent in preparing for, documenting and reviewing labs/imaging for today's visit    FOLLOW-UP VISIT    Patient Name: Zander Stallworth      : 1955     Age: 65 year old        ______________________________________________________________________________     Chief Complaint   Patient presents with     Cancer     14 month fup for rad therapy to prostate bed     There were no vitals taken for this visit.     Date Radiation Completed: 3/4/2020    Pain  Denies    Labs  Other Labs: Yes: PSA 5/3/2021 <0.01    Imaging  None        PSA:   PSA   Date Value Ref Range Status   2021 <0.01 0 - 4 ug/L Final     Comment:     Assay Method:  Chemiluminescence using Siemens Vista analyzer   2020 <0.01 0 - 4 ug/L Final     Comment:     Assay Method:  Chemiluminescence using Siemens Vista analyzer   2020 <0.01 0 - 4 ug/L Final     Comment:     Assay Method:  Chemiluminescence using Siemens Vista analyzer   2019 0.34 0 - 4 ug/L Final     Comment:     Assay Method:  Chemiluminescence using Siemens Vista analyzer     Testosterone Level:   Testosterone Total   Date Value Ref Range Status   2019 275 240 - 950 ng/dL Final     Comment:     This test was developed and its performance characteristics determined by the   [facility],  Special [facility] Laboratory. It has   not been cleared or approved by the FDA. The laboratory is regulated under   CLIA as qualified to perform high-complexity testing. This test is used for   clinical purposes. It should not be regarded as investigational or for   research.          On-Study AUA Symptom Score (PQ)  AUA (American Urological Association) Symptom Score  1. Over the past month, how often have you had a sensation of not emptying your bladder completely after you finished urinating?: Not at all  2. Over the past month, how often have you had to urinate again less than two hours after you finished urinating?: More than half the time  3. Over the past month, how often have you found you stopped and started again several times when you urinated?: Not at all  4. Over the past month, how often have you found it difficult to postpone urination?: Less than half the time  5. Over the past month, how often have you had a weak urine stream?: Not at all  6. Over the past month, how often have you had to push or strain to begin urinating?: Not at all  7. Over the past month, how many times did you typically get up to urinate from the time you went to bed at night until the time you got up in the morning?: 1 time  AUA Score: 7    Diarrhea: 0- None    Constipation: 0- None      Other Appointments:     MD Name:  Appointment Date:    MD Name: Appointment Date:   MD Name: Appointment Date:   Other Appointment Notes:     Residual Radiation side effect: occ urinary incontinence  Using 1 pad/day    Additional Instructions:     Nurse face-to-face time: Level 4:  15 min face to face time        Again, thank you for allowing me to participate in the care of your patient.        Sincerely,        Doug Schreiber MD

## 2021-08-29 ENCOUNTER — HEALTH MAINTENANCE LETTER (OUTPATIENT)
Age: 66
End: 2021-08-29

## 2021-10-01 ENCOUNTER — ONCOLOGY VISIT (OUTPATIENT)
Dept: ONCOLOGY | Facility: CLINIC | Age: 66
End: 2021-10-01
Attending: RADIOLOGY
Payer: MEDICARE

## 2021-10-01 VITALS
OXYGEN SATURATION: 98 % | TEMPERATURE: 98.1 F | RESPIRATION RATE: 18 BRPM | WEIGHT: 207.1 LBS | SYSTOLIC BLOOD PRESSURE: 118 MMHG | HEART RATE: 59 BPM | DIASTOLIC BLOOD PRESSURE: 73 MMHG | BODY MASS INDEX: 29.72 KG/M2

## 2021-10-01 DIAGNOSIS — C61 MALIGNANT NEOPLASM OF PROSTATE (H): Primary | ICD-10-CM

## 2021-10-01 PROCEDURE — 96402 CHEMO HORMON ANTINEOPL SQ/IM: CPT

## 2021-10-01 PROCEDURE — 250N000011 HC RX IP 250 OP 636: Performed by: RADIOLOGY

## 2021-10-01 RX ADMIN — LEUPROLIDE ACETATE 45 MG: KIT at 13:17

## 2021-10-01 ASSESSMENT — PAIN SCALES - GENERAL: PAINLEVEL: NO PAIN (0)

## 2021-10-01 NOTE — LETTER
10/1/2021         RE: Zander Stallworth  [address]  [place] [address]        Dear Colleague,    Thank you for referring your patient, Zander Stallworth, to the [facility]. Please see a copy of my visit note below.    Chief Complaint   Patient presents with     Imm/Inj     Patient with Malignant neoplasm of prostate - here for vitals and a Lupron injection     Patient arrived to clinic for a Lupron injection today and has no specific complaints and has been feeling well.  Patient declined to speak with an RN today.   No results needed for treatment today.  Lupron injection given to Right Ventrogluteal without incident and patient tolerated procedure well.  Patient will return in December for next provider appointment and a message will be sent to schedulers to make his next injection appointment.      Again, thank you for allowing me to participate in the care of your patient.        Sincerely,        [facility] [place]

## 2021-10-01 NOTE — PROGRESS NOTES
Chief Complaint   Patient presents with     Imm/Inj     Patient with Malignant neoplasm of prostate - here for vitals and a Lupron injection     Patient arrived to clinic for a Lupron injection today and has no specific complaints and has been feeling well.  Patient declined to speak with an RN today.   No results needed for treatment today.  Lupron injection given to Right Ventrogluteal without incident and patient tolerated procedure well.  Patient will return in December for next provider appointment and a message will be sent to schedulers to make his next injection appointment.

## 2021-12-16 ENCOUNTER — PATIENT OUTREACH (OUTPATIENT)
Dept: ONCOLOGY | Facility: CLINIC | Age: 66
End: 2021-12-16
Payer: MEDICARE

## 2021-12-16 DIAGNOSIS — C61 PROSTATE CANCER (H): Primary | ICD-10-CM

## 2021-12-19 ENCOUNTER — HEALTH MAINTENANCE LETTER (OUTPATIENT)
Age: 66
End: 2021-12-19

## 2021-12-23 ENCOUNTER — LAB (OUTPATIENT)
Dept: LAB | Facility: CLINIC | Age: 66
End: 2021-12-23
Attending: INTERNAL MEDICINE
Payer: MEDICARE

## 2021-12-23 DIAGNOSIS — C61 PROSTATE CANCER (H): ICD-10-CM

## 2021-12-23 LAB
ALBUMIN SERPL-MCNC: 3.9 G/DL (ref 3.4–5)
ALP SERPL-CCNC: 84 U/L (ref 40–150)
ALT SERPL W P-5'-P-CCNC: 51 U/L (ref 0–70)
ANION GAP SERPL CALCULATED.3IONS-SCNC: 14 MMOL/L (ref 3–14)
AST SERPL W P-5'-P-CCNC: 28 U/L (ref 0–45)
BASOPHILS # BLD AUTO: 0 10E3/UL (ref 0–0.2)
BASOPHILS NFR BLD AUTO: 0 %
BILIRUB SERPL-MCNC: 0.5 MG/DL (ref 0.2–1.3)
BUN SERPL-MCNC: 14 MG/DL (ref 7–30)
CALCIUM SERPL-MCNC: 8.8 MG/DL (ref 8.5–10.1)
CHLORIDE BLD-SCNC: 105 MMOL/L (ref 94–109)
CO2 SERPL-SCNC: 22 MMOL/L (ref 20–32)
CREAT SERPL-MCNC: 0.66 MG/DL (ref 0.66–1.25)
EOSINOPHIL # BLD AUTO: 0.2 10E3/UL (ref 0–0.7)
EOSINOPHIL NFR BLD AUTO: 3 %
ERYTHROCYTE [DISTWIDTH] IN BLOOD BY AUTOMATED COUNT: 12.2 % (ref 10–15)
GFR SERPL CREATININE-BSD FRML MDRD: >90 ML/MIN/1.73M2
GLUCOSE BLD-MCNC: 276 MG/DL (ref 70–99)
HCT VFR BLD AUTO: 37.5 % (ref 40–53)
HGB BLD-MCNC: 12.6 G/DL (ref 13.3–17.7)
IMM GRANULOCYTES # BLD: 0 10E3/UL
IMM GRANULOCYTES NFR BLD: 0 %
LYMPHOCYTES # BLD AUTO: 0.8 10E3/UL (ref 0.8–5.3)
LYMPHOCYTES NFR BLD AUTO: 15 %
MCH RBC QN AUTO: 28.4 PG (ref 26.5–33)
MCHC RBC AUTO-ENTMCNC: 33.6 G/DL (ref 31.5–36.5)
MCV RBC AUTO: 85 FL (ref 78–100)
MONOCYTES # BLD AUTO: 0.4 10E3/UL (ref 0–1.3)
MONOCYTES NFR BLD AUTO: 7 %
NEUTROPHILS # BLD AUTO: 3.8 10E3/UL (ref 1.6–8.3)
NEUTROPHILS NFR BLD AUTO: 75 %
NRBC # BLD AUTO: 0 10E3/UL
NRBC BLD AUTO-RTO: 0 /100
PLATELET # BLD AUTO: 193 10E3/UL (ref 150–450)
POTASSIUM BLD-SCNC: 4.4 MMOL/L (ref 3.4–5.3)
PROT SERPL-MCNC: 7 G/DL (ref 6.8–8.8)
PSA SERPL-MCNC: <0.01 UG/L (ref 0–4)
RBC # BLD AUTO: 4.43 10E6/UL (ref 4.4–5.9)
SODIUM SERPL-SCNC: 141 MMOL/L (ref 133–144)
WBC # BLD AUTO: 5.1 10E3/UL (ref 4–11)

## 2021-12-23 PROCEDURE — 36415 COLL VENOUS BLD VENIPUNCTURE: CPT

## 2021-12-23 PROCEDURE — 80053 COMPREHEN METABOLIC PANEL: CPT

## 2021-12-23 PROCEDURE — 85025 COMPLETE CBC W/AUTO DIFF WBC: CPT

## 2021-12-23 PROCEDURE — 84153 ASSAY OF PSA TOTAL: CPT

## 2021-12-23 NOTE — NURSING NOTE
Chief Complaint   Patient presents with     Labs Only     venipuncture     Jeannie Verdugo RN on 12/23/2021 at 12:13 PM

## 2022-01-03 ENCOUNTER — VIRTUAL VISIT (OUTPATIENT)
Dept: ONCOLOGY | Facility: CLINIC | Age: 67
End: 2022-01-03
Attending: INTERNAL MEDICINE
Payer: MEDICARE

## 2022-01-03 DIAGNOSIS — E11.9 TYPE 2 DIABETES MELLITUS WITHOUT COMPLICATION, WITHOUT LONG-TERM CURRENT USE OF INSULIN (H): ICD-10-CM

## 2022-01-03 DIAGNOSIS — C61 PROSTATE CANCER (H): Primary | ICD-10-CM

## 2022-01-03 PROCEDURE — 99443 PR PHYSICIAN TELEPHONE EVALUATION 21-30 MIN: CPT | Mod: 95 | Performed by: INTERNAL MEDICINE

## 2022-01-03 PROCEDURE — 999N001193 HC VIDEO/TELEPHONE VISIT; NO CHARGE

## 2022-01-03 NOTE — LETTER
"    1/3/2022         RE: Zander Stallworth  [address]  [place] [address]        Dear Colleague,    Thank you for referring your patient, Zander Stallworth, to the [facility] CANCER CL[facility]. Please see a copy of my visit note below.    MEDICAL ONCOLOGY VIRTUAL VISIT NOTE    REFERRING PROVIDER: Dr. Ronquillo ([facility])  RADIATION ONCOLOGIST: Doug Schreiber MD    REASON FOR CURRENT VISIT: Recurrent prostate cancer    HISTORY OF PRESENT ILLNESS: Mr. Zander Stallworth is a 66 year old gentleman who was originally referred by Dr. Ronquillo of Urology for evaluation of rising PSA and concern for metastatic prostate cancer. He was diagnosed with prostate cancer initially in 2017 after an abnormal FIDE. TRUS with biopsy revealed Hallie 5+3=8 prostate adenocarcinoma in 9 of 12 cores (invovling 60% of the tissue), with perineural invasion. PSA was 2.7 at that time. CT and bone scan showed no evidence of metastatic disease. He underwent radical prostatectomy on 8/7/17, pathology consistent with [place] 3+4, [id number]. PSA was undetectable after surgery, until February 2019 when PSA was found to be 0.1, up to 0.2 on 8/29/19 and stable at 0.2 on 9/30/19. PET/CT on 9/20/19 revealed findings suspicious for metastatic prostate cancer involving hypermetabolic lymph nodes measuring 0.8 x 0.6 cm in the right internal iliac chain along the right pelvic sidewall and 0.5 x 0.4 cm in the right common iliac chain just anterior to the L5 vertebral body. He was seen by Dr. Ronquillo in urology on 10/3/19 and discussed treatment options including radiation, hormone therapy and pelvic lymph node dissection.    He then underwent a bilateral robot-assisted laparoscopic pelvic lymph node dissection with Dr. Ronquillo on 11/19/19 and pathology showed - \"A. Lymph nodes, right pelvic, excision: - Seven benign lymph nodes (0/7)  B. Lymph nodes, right iliac, excision: - Six benign lymph nodes (0/6)  C. Lymph nodes, right common iliac, excision: - Two of " "five lymph nodes positive for metastatic prostatic adenocarcinoma (2/5); please see comment - Largest metastatic deposit: 1.1 cm - Extranodal extension is present - D. Lymph nodes, right deep obturator, excision: - Benign soft tissue - No lymph nodes identified E. Lymph nodes, left pelvic, excision: - Three benign lymph nodes (0/3).  COMMENT: Immunohistochemical stains show the tumor is positive for synaptophysin. Chromogranin and CD56 show scattered reactivity. While not confirmatory, these results suggest high grade neuroendocrine differentiation.\" Case was reviewed in  tumor board and recommendation made to give ADT+pelvic RT.    He started indefinite ADT with Lupron on 12/17/2019 and received salvage radiotherapy with Dr. Schreiber around March 2020. Has done well with RT and except for occasional urinary incontinence and low LILY score, has no other issues to report. Has mild hot flashes, mild loss of muscle mass, and gynecomastia with ADT. He has no signs/symptoms of disease progression. He is retired and maintains a very active lifestyle, walking up to 5 miles per day.     REVIEW OF SYSTEMS:  A full 14 point ROS was negative other than the symptoms noted above in the HPI.    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     Hypercholesterolemia 11/27/2009     Lesion of plantar nerve 04/02/2015     Malabsorption of glucose 11/18/2009     Malignant neoplasm of prostate (H) 06/13/2017     Metatarsalgia 04/02/2015     Presbyopia 11/09/2010     Sensorineural hearing loss, bilateral 06/14/2012     Prostate cancer (H) 11/07/2019     Resolved Ambulatory Problems     Diagnosis Date Noted     No Resolved Ambulatory Problems     Past Medical History:   Diagnosis Date     Allergic rhinitis      DM (diabetes mellitus) (H)      Hearing loss      " HLD (hyperlipidemia)      PAST SURGICAL HISTORY:  Past Surgical History:   Procedure Laterality Date     DAVINCI LYMPHADENECTOMY N/A 11/19/2019    Procedure: davinci assisted bilateral pelvic lymph node dissection;  Surgeon: Ousmane Ronquillo MD;  Location: [facility] OR     parotid gland removed  2015    parotidectomy      PROSTATE SURGERY  08/07/2017    radical prostatectomy with bilateral pelvic lymph node dissection      SOCIAL HISTORY:   Social History     Tobacco Use     Smoking status: Never Smoker     Smokeless tobacco: Never Used   Substance Use Topics     Alcohol use: Yes     Comment: 10 drinks/week     Drug use: Never     FAMILY HISTORY:  - Father had dementia in his late 60s, as did his paternal grandfather.  - Father had colon cancer and later lung cancer.    ALLERGIES:  Allergies   Allergen Reactions     Amoxicillin-Pot Clavulanate      Itching      Erythromycin      Sildenafil      Visual changes with sildenafil.      CURRENT MEDICATIONS:  Current Outpatient Medications:      diclofenac (VOLTAREN) 1 % topical gel, Apply topically as needed, Disp: , Rfl:      fexofenadine (ALLEGRA) 60 MG tablet, Take 60 mg by mouth every morning , Disp: , Rfl:      fluticasone (FLONASE) 50 MCG/ACT nasal spray, Spray 2 sprays into both nostrils daily , Disp: , Rfl:      hydrocortisone (ANUCORT-HC) 25 MG suppository, Place 25 mg rectally 2 times daily PRN, Disp: , Rfl:      metFORMIN (GLUCOPHAGE) 500 MG tablet, Take 500 mg by mouth daily (with breakfast) , Disp: , Rfl:      Multiple Vitamins-Minerals (CENTRUM SILVER PO), Take by mouth every morning , Disp: , Rfl:      simvastatin (ZOCOR) 10 MG tablet, At Bedtime , Disp: , Rfl:     PHYSICAL EXAMINATION:  Virtual visit.    LABORATORY DATA:     Results for RONALOD SAVAGE (MRN [id number]) as of 1/3/2022 10:20   Ref. Range 12/23/2021 12:11   Sodium Latest Ref Range: 133 - 144 mmol/L 141   Potassium Latest Ref Range: 3.4 - 5.3 mmol/L 4.4   Chloride Latest Ref Range: 94 - 109  mmol/L 105   Carbon Dioxide Latest Ref Range: 20 - 32 mmol/L 22   Urea Nitrogen Latest Ref Range: 7 - 30 mg/dL 14   Creatinine Latest Ref Range: 0.66 - 1.25 mg/dL 0.66   GFR Estimate Latest Ref Range: >60 mL/min/1.73m2 >90   Calcium Latest Ref Range: 8.5 - 10.1 mg/dL 8.8   Anion Gap Latest Ref Range: 3 - 14 mmol/L 14   Albumin Latest Ref Range: 3.4 - 5.0 g/dL 3.9   Protein Total Latest Ref Range: 6.8 - 8.8 g/dL 7.0   Bilirubin Total Latest Ref Range: 0.2 - 1.3 mg/dL 0.5   Alkaline Phosphatase Latest Ref Range: 40 - 150 U/L 84   ALT Latest Ref Range: 0 - 70 U/L 51   AST Latest Ref Range: 0 - 45 U/L 28   PSA Latest Ref Range: 0.00 - 4.00 ug/L <0.01   Glucose Latest Ref Range: 70 - 99 mg/dL 276 (H)   WBC Latest Ref Range: 4.0 - 11.0 10e3/uL 5.1   Hemoglobin Latest Ref Range: 13.3 - 17.7 g/dL 12.6 (L)   Hematocrit Latest Ref Range: 40.0 - 53.0 % 37.5 (L)   Platelet Count Latest Ref Range: 150 - 450 10e3/uL 193   RBC Count Latest Ref Range: 4.40 - 5.90 10e6/uL 4.43   MCV Latest Ref Range: 78 - 100 fL 85   MCH Latest Ref Range: 26.5 - 33.0 pg 28.4   MCHC Latest Ref Range: 31.5 - 36.5 g/dL 33.6   RDW Latest Ref Range: 10.0 - 15.0 % 12.2   % Neutrophils Latest Units: % 75   % Lymphocytes Latest Units: % 15   % Monocytes Latest Units: % 7   % Eosinophils Latest Units: % 3   % Basophils Latest Units: % 0   Absolute Basophils Latest Ref Range: 0.0 - 0.2 10e3/uL 0.0   Absolute Eosinophils Latest Ref Range: 0.0 - 0.7 10e3/uL 0.2   Absolute Immature Granulocytes Latest Ref Range: <=0.4 10e3/uL 0.0   Absolute Lymphocytes Latest Ref Range: 0.8 - 5.3 10e3/uL 0.8   Absolute Monocytes Latest Ref Range: 0.0 - 1.3 10e3/uL 0.4   % Immature Granulocytes Latest Units: % 0   Absolute Neutrophils Latest Ref Range: 1.6 - 8.3 10e3/uL 3.8   Absolute NRBCs Latest Units: 10e3/uL 0.0   NRBCs per 100 WBC Latest Ref Range: <1 /100 0           IMAGING STUDIES:  Reviewed Axumin PET Scan from 9/20/19:  - Notable only for two fluciclovine-avid lymph  nodes measuring 0.8 x 0.6 cm in the right internal iliac chain along the right pelvic side wall and 0.5 x 0.4 cm in the right common iliac chain just anterior to the L5 vertebral body.  - Findings suspicious for recurrent prostate cancer involving the pelvic lymph nodes.    ASSESSMENT AND PLAN: Mr. Stallworth is a 66 year old man with locally recurrent prostate cancer, here for a follow-up.     1. Locally-recurrent prostate cancer:   - Pt completed salvage RT and now on ADT for locally-recurrent prostate ca.   - Tolerating well and will get next dose of lupron tomorrow. Discussed the option of 6-monthly dose. Pt asked to review this with Rad Onc team.   - Concur with 2 years (or more) of planned ADT per Dr. Schreiber. This is completed now  --Cancelling March injectino  --recommend Testosterone and PSA and phone visit in 6 months.   - PSMA PET scan if PSA rises.  - Primary onc mgmt per Dr. Schreiber and keep f/up with me in Dec 2021.     2. Muscle loss:   - Had joined a gym through health plan but unable to access.   - Discussed resistance exercises. Continue doing [id number] steps a day.     3. Gynecomastia:  - Discussed medical versus RT options for treatment. Pt wants to hold off.     4. Hematuria - will see [facility]. May be RT related but if persists would recommend Cystoscopy    RTC 6 months phone visit       25 minutes reviweing case and in discussion with patient.     Ezra Rivera MD

## 2022-01-03 NOTE — PROGRESS NOTES
"Zander is a 66 year old who is being evaluated via a billable telephone visit.      What phone number would you like to be contacted at?   [phone]  How would you like to obtain your AVS? ContinuumRxt  Phone call duration: 25 minutes  Carmelina Epperson       MEDICAL ONCOLOGY VIRTUAL VISIT NOTE      REFERRING PROVIDER: Dr. Ronquillo ([facility])  RADIATION ONCOLOGIST: Doug Schreiber MD    REASON FOR CURRENT VISIT: Recurrent prostate cancer    HISTORY OF PRESENT ILLNESS: Mr. Zander Stallworth is a 66 year old gentleman who was originally referred by Dr. Ronquillo of Urology for evaluation of rising PSA and concern for metastatic prostate cancer. He was diagnosed with prostate cancer initially in 2017 after an abnormal FIDE. TRUS with biopsy revealed Hallie 5+3=8 prostate adenocarcinoma in 9 of 12 cores (invovling 60% of the tissue), with perineural invasion. PSA was 2.7 at that time. CT and bone scan showed no evidence of metastatic disease. He underwent radical prostatectomy on 8/7/17, pathology consistent with Hallie 3+4, [id number]. PSA was undetectable after surgery, until February 2019 when PSA was found to be 0.1, up to 0.2 on 8/29/19 and stable at 0.2 on 9/30/19. PET/CT on 9/20/19 revealed findings suspicious for metastatic prostate cancer involving hypermetabolic lymph nodes measuring 0.8 x 0.6 cm in the right internal iliac chain along the right pelvic sidewall and 0.5 x 0.4 cm in the right common iliac chain just anterior to the L5 vertebral body. He was seen by Dr. Ronquillo in urology on 10/3/19 and discussed treatment options including radiation, hormone therapy and pelvic lymph node dissection.    He then underwent a bilateral robot-assisted laparoscopic pelvic lymph node dissection with Dr. Ronquillo on 11/19/19 and pathology showed - \"A. Lymph nodes, right pelvic, excision: - Seven benign lymph nodes (0/7)  B. Lymph nodes, right iliac, excision: - Six benign lymph nodes (0/6)  C. Lymph nodes, right common iliac, excision: " "- Two of five lymph nodes positive for metastatic prostatic adenocarcinoma (2/5); please see comment - Largest metastatic deposit: 1.1 cm - Extranodal extension is present - D. Lymph nodes, right deep obturator, excision: - Benign soft tissue - No lymph nodes identified E. Lymph nodes, left pelvic, excision: - Three benign lymph nodes (0/3).  COMMENT: Immunohistochemical stains show the tumor is positive for synaptophysin. Chromogranin and CD56 show scattered reactivity. While not confirmatory, these results suggest high grade neuroendocrine differentiation.\" Case was reviewed in  tumor board and recommendation made to give ADT+pelvic RT.    He started indefinite ADT with Lupron on 12/17/2019 and received salvage radiotherapy with Dr. Schreiber around March 2020. Has done well with RT and except for occasional urinary incontinence and low LILY score, has no other issues to report. Has mild hot flashes, mild loss of muscle mass, and gynecomastia with ADT. He has no signs/symptoms of disease progression. He is retired and maintains a very active lifestyle, walking up to 5 miles per day.     REVIEW OF SYSTEMS:  A full 14 point ROS was negative other than the symptoms noted above in the HPI.    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     Hypercholesterolemia 11/27/2009     Lesion of plantar nerve 04/02/2015     Malabsorption of glucose 11/18/2009     Malignant neoplasm of prostate (H) 06/13/2017     Metatarsalgia 04/02/2015     Presbyopia 11/09/2010     Sensorineural hearing loss, bilateral 06/14/2012     Prostate cancer (H) 11/07/2019     Resolved Ambulatory Problems     Diagnosis Date Noted     No Resolved Ambulatory Problems     Past Medical History:   Diagnosis Date     Allergic rhinitis      DM (diabetes mellitus) (H)      Hearing " loss      HLD (hyperlipidemia)      PAST SURGICAL HISTORY:  Past Surgical History:   Procedure Laterality Date     DAVINCI LYMPHADENECTOMY N/A 11/19/2019    Procedure: davinci assisted bilateral pelvic lymph node dissection;  Surgeon: Ousmane Ronquillo MD;  Location: [facility] OR     parotid gland removed  2015    parotidectomy      PROSTATE SURGERY  08/07/2017    radical prostatectomy with bilateral pelvic lymph node dissection      SOCIAL HISTORY:   Social History     Tobacco Use     Smoking status: Never Smoker     Smokeless tobacco: Never Used   Substance Use Topics     Alcohol use: Yes     Comment: 10 drinks/week     Drug use: Never     FAMILY HISTORY:  - Father had dementia in his late 60s, as did his paternal grandfather.  - Father had colon cancer and later lung cancer.    ALLERGIES:  Allergies   Allergen Reactions     Amoxicillin-Pot Clavulanate      Itching      Erythromycin      Sildenafil      Visual changes with sildenafil.      CURRENT MEDICATIONS:  Current Outpatient Medications:      diclofenac (VOLTAREN) 1 % topical gel, Apply topically as needed, Disp: , Rfl:      fexofenadine (ALLEGRA) 60 MG tablet, Take 60 mg by mouth every morning , Disp: , Rfl:      fluticasone (FLONASE) 50 MCG/ACT nasal spray, Spray 2 sprays into both nostrils daily , Disp: , Rfl:      hydrocortisone (ANUCORT-HC) 25 MG suppository, Place 25 mg rectally 2 times daily PRN, Disp: , Rfl:      metFORMIN (GLUCOPHAGE) 500 MG tablet, Take 500 mg by mouth daily (with breakfast) , Disp: , Rfl:      Multiple Vitamins-Minerals (CENTRUM SILVER PO), Take by mouth every morning , Disp: , Rfl:      simvastatin (ZOCOR) 10 MG tablet, At Bedtime , Disp: , Rfl:     PHYSICAL EXAMINATION:  Virtual visit.    LABORATORY DATA:     Results for RONALDO SAVAGE (MRN [id number]) as of 1/3/2022 10:20   Ref. Range 12/23/2021 12:11   Sodium Latest Ref Range: 133 - 144 mmol/L 141   Potassium Latest Ref Range: 3.4 - 5.3 mmol/L 4.4   Chloride Latest Ref Range: 94  - 109 mmol/L 105   Carbon Dioxide Latest Ref Range: 20 - 32 mmol/L 22   Urea Nitrogen Latest Ref Range: 7 - 30 mg/dL 14   Creatinine Latest Ref Range: 0.66 - 1.25 mg/dL 0.66   GFR Estimate Latest Ref Range: >60 mL/min/1.73m2 >90   Calcium Latest Ref Range: 8.5 - 10.1 mg/dL 8.8   Anion Gap Latest Ref Range: 3 - 14 mmol/L 14   Albumin Latest Ref Range: 3.4 - 5.0 g/dL 3.9   Protein Total Latest Ref Range: 6.8 - 8.8 g/dL 7.0   Bilirubin Total Latest Ref Range: 0.2 - 1.3 mg/dL 0.5   Alkaline Phosphatase Latest Ref Range: 40 - 150 U/L 84   ALT Latest Ref Range: 0 - 70 U/L 51   AST Latest Ref Range: 0 - 45 U/L 28   PSA Latest Ref Range: 0.00 - 4.00 ug/L <0.01   Glucose Latest Ref Range: 70 - 99 mg/dL 276 (H)   WBC Latest Ref Range: 4.0 - 11.0 10e3/uL 5.1   Hemoglobin Latest Ref Range: 13.3 - 17.7 g/dL 12.6 (L)   Hematocrit Latest Ref Range: 40.0 - 53.0 % 37.5 (L)   Platelet Count Latest Ref Range: 150 - 450 10e3/uL 193   RBC Count Latest Ref Range: 4.40 - 5.90 10e6/uL 4.43   MCV Latest Ref Range: 78 - 100 fL 85   MCH Latest Ref Range: 26.5 - 33.0 pg 28.4   MCHC Latest Ref Range: 31.5 - 36.5 g/dL 33.6   RDW Latest Ref Range: 10.0 - 15.0 % 12.2   % Neutrophils Latest Units: % 75   % Lymphocytes Latest Units: % 15   % Monocytes Latest Units: % 7   % Eosinophils Latest Units: % 3   % Basophils Latest Units: % 0   Absolute Basophils Latest Ref Range: 0.0 - 0.2 10e3/uL 0.0   Absolute Eosinophils Latest Ref Range: 0.0 - 0.7 10e3/uL 0.2   Absolute Immature Granulocytes Latest Ref Range: <=0.4 10e3/uL 0.0   Absolute Lymphocytes Latest Ref Range: 0.8 - 5.3 10e3/uL 0.8   Absolute Monocytes Latest Ref Range: 0.0 - 1.3 10e3/uL 0.4   % Immature Granulocytes Latest Units: % 0   Absolute Neutrophils Latest Ref Range: 1.6 - 8.3 10e3/uL 3.8   Absolute NRBCs Latest Units: 10e3/uL 0.0   NRBCs per 100 WBC Latest Ref Range: <1 /100 0           IMAGING STUDIES:  Reviewed Axumin PET Scan from 9/20/19:  - Notable only for two fluciclovine-avid  lymph nodes measuring 0.8 x 0.6 cm in the right internal iliac chain along the right pelvic side wall and 0.5 x 0.4 cm in the right common iliac chain just anterior to the L5 vertebral body.  - Findings suspicious for recurrent prostate cancer involving the pelvic lymph nodes.    ASSESSMENT AND PLAN: Mr. Stallworth is a 66 year old man with locally recurrent prostate cancer, here for a follow-up.     1. Locally-recurrent prostate cancer:   - Pt completed salvage RT and now on ADT for locally-recurrent prostate ca.   - Tolerating well and will get next dose of lupron tomorrow. Discussed the option of 6-monthly dose. Pt asked to review this with Rad Onc team.   - Concur with 2 years (or more) of planned ADT per Dr. Schreiber. This is completed now  --Cancelling March injectino  --recommend Testosterone and PSA and phone visit in 6 months.   - PSMA PET scan if PSA rises.  - Primary onc mgmt per Dr. Schreiber and keep f/up with me in Dec 2021.     2. Muscle loss:   - Had joined a gym through health plan but unable to access.   - Discussed resistance exercises. Continue doing [id number] steps a day.     3. Gynecomastia:  - Discussed medical versus RT options for treatment. Pt wants to hold off.     4. Hematuria - will see [facility]. May be RT related but if persists would recommend Cystoscopy    RTC 6 months phone visit       25 minutes reviweing case and in discussion with patient.     Ezra Rivera MD

## 2022-01-03 NOTE — NURSING NOTE
Regarding medication patient reports in addition to his verified list he is also taking:  Glucosamine Chondroitin 1,500/1,200mg taking 1 tablet by mouth daily.  Ocuvite vitamin 1 tablet by mouth daily.    Carmelina Epperson VF

## 2022-01-05 ENCOUNTER — VIRTUAL VISIT (OUTPATIENT)
Dept: RADIATION ONCOLOGY | Facility: CLINIC | Age: 67
End: 2022-01-05
Attending: RADIOLOGY
Payer: MEDICARE

## 2022-01-05 DIAGNOSIS — C61 MALIGNANT NEOPLASM OF PROSTATE (H): Primary | ICD-10-CM

## 2022-01-05 NOTE — LETTER
2022         RE: Zander Stallworth  [address]  [place] [address]        Dear Colleague,    Thank you for referring your patient, Zander Stallworth, to the [facility] RADIATION ONCOLOGY. Please see a copy of my visit note below.    [facility] [place]  Radiation Oncology Follow-up Note  2022     Zander Stallworth   MRN: [id number]  : 1955      DISEASE TREATED: Recurrent high risk prostate cancer, high-grade neuroendocrine differentiation. [id number], Hallie 3+4=7, PSA=0.2.  Axumin PET/CT scan for LN+     INTERVAL SINCE COMPLETION OF RADIATION THERAPY: 22 months (completed on 3/4/2020)     TYPE OF RADIATION THERAPY ADMINISTERED: 7000 cGy in 35 fractions (4600cGy to LN + Boost)       HORMONAL THERAPY: Plan Indefinite. Lupron 22.5 mg given 2019, 45 mg given 3/18/2020, 2020, 2021)     SUBJECTIVE: Mr. Stallworth is a 65 year old man with biochemically and nodally recurrent prostate cancer. His pre-op PSA was 2.7, with highest Hallie's score of 5+3=8. He underwent upfront treatment with RALP and BLND in 2017. There was no evidence of EPE SVI, federica involvement (0/3); and negative margins were achieved.      His post-op PSA was initially undetectable. It mariano to a value of 0.1 on 2/15/2019, and increased to 0.2 on 2019. Axumin PET/CT on 2019 then showed FDG avidity in one right common iliac node and an right internal iliac node. He underwent repeat PLND on 2019, with pathology revealing high grade neuroendocrine tumor in 2/5 right common iliac nodes dissected. This was deemed to be recurrence of his prostate cancer, and he proceeded to undergo salvage radiotherapy with indefinite ADT as described above.      Mr. Stallworth tolerated radiotherapy relatively well, developing only the expected side effect of increased urinary symptoms and diarrhea. He has since had significant interval recovery from these symptom.  His last Lupron shot was  10-1-21. He was seen in follow-up with his medical oncologist, Dr. Rivera, 1-3-22 at which time they discussed stopping ADT until the testosterone starts to rise again.     On interview today, the patient states that he is well. He has intermittent hematuria. He denies use of aspirin, NSAIDS or blood thinners. He is experiencing hot flashes several times a day. His AUA Score remains 7/35. He complains of weight gain and penile shrinkage. His bilateral breast tissue has not increased in size and does not bother him. He has decided against breast radiation.     OBJECTIVE: A/Ox3. NAD. Pleasant sounding on the phone    PSA     12/23/2021 <0.01   10/1/2021 Lupron 45mg   05/03/2021 <0.01   4/2/2021 Lupron 45 mg   11/13/2020 <0.01   9/16/2020 Lupron 45 mg   5/8/2020 <0.01   3/18/2020 Lupron 45 mg   3/4/2020 Salvage Radiotherapy   12/17/2019 Lupron 22.5 mg started   12/17/2020 (pre-RT) 0.34   9/30/2019 0.2   8/29/2019 0.2   5/20/2019 0.1   2/14/2019 0.1   8/14/2018 <0.1   2/12/2018 <0.1   11/14/2017 (post-op) <0.1   8/7/2017 RARP   5/12/2017 2.7   6/10/2015 1.99   9/26/2014 1.64      IMPRESSION: PSA remains undetectable. No clinical evidence for recurrent disease at this time. He complains of hematuria and penile shrinkage.     RECOMMENDATIONS:    Refer to urology Dr. Owens for evaluation of hematuria and discussion of penile shrinkage  Return to clinic in 6 months   Repeat PSA in 6 months     Brissa Wu MD PGY2     Telephone time: 12 minutes. 30 minutes were spent in preparing for, documenting and reviewing labs/imaging for today's visit     I talked to the patient with the resident.  I agree with the resident's note and plan of care.       ABRAHAM Schreiber M.D.  Department of Radiation Oncology  [facility]      Zander is a 66 year old who is being evaluated via a billable telephone visit.      What phone number would you like to be contacted at? [phone]  How would you like to obtain your  AVS? MyChart    FOLLOW-UP VISIT    Patient Name: Zander Stallworth      : 1955     Age: 66 year old        ______________________________________________________________________________     Chief Complaint   Patient presents with     Cancer     Radiation follow up     There were no vitals taken for this visit.     Date Radiation Completed: Prostate Cancer: Pelvis 7000 cGy completed 20    Pain  Denies    Labs  Other Labs: Yes: 2021 PSA    Imaging  None      PSA:   PSA   Date Value Ref Range Status   2021 <0.01 0 - 4 ug/L Final     Comment:     Assay Method:  Chemiluminescence using Siemens Vista analyzer   2020 <0.01 0 - 4 ug/L Final     Comment:     Assay Method:  Chemiluminescence using Siemens Vista analyzer   2020 <0.01 0 - 4 ug/L Final     Comment:     Assay Method:  Chemiluminescence using Siemens Vista analyzer   2019 0.34 0 - 4 ug/L Final     Comment:     Assay Method:  Chemiluminescence using Siemens Vista analyzer     PSA Tumor Marker   Date Value Ref Range Status   2021 <0.01 0.00 - 4.00 ug/L Final     Testosterone Level:   Testosterone Total   Date Value Ref Range Status   2019 275 240 - 950 ng/dL Final     Comment:     This test was developed and its performance characteristics determined by the   [facility],  Special Chemistry Laboratory. It has   not been cleared or approved by the FDA. The laboratory is regulated under   CLIA as qualified to perform high-complexity testing. This test is used for   clinical purposes. It should not be regarded as investigational or for   research.         On-Study AUA Symptom Score (PQ)  AUA (American Urological Association) Symptom Score  1. Over the past month, how often have you had a sensation of not emptying your bladder completely after you finished urinating?: Not at all  2. Over the past month, how often have you had to urinate again less than two hours after you finished urinating?:  More than half the time  3. Over the past month, how often have you found you stopped and started again several times when you urinated?: Not at all  4. Over the past month, how often have you found it difficult to postpone urination?: Less than 1 time in 5  5. Over the past month, how often have you had a weak urine stream?: Not at all  6. Over the past month, how often have you had to push or strain to begin urinating?: Not at all  7. Over the past month, how many times did you typically get up to urinate from the time you went to bed at night until the time you got up in the morning?: 1 time  AUA Score: 6    Diarrhea: 0- None    Constipation: 0- None      Other Appointments: No    MD Name:  Appointment Date:    MD Name: Appointment Date:   MD Name: Appointment Date:   Other Appointment Notes:     Residual Radiation side effect: Occ. Has had some blood in his urine would ;val to talk about this.     Additional Instructions:     Nurse face-to-face time: Level 3:  10 min phone  time      Again, thank you for allowing me to participate in the care of your patient.        Sincerely,        Doug Schreiber MD

## 2022-01-05 NOTE — PROGRESS NOTES
Zander is a 66 year old who is being evaluated via a billable telephone visit.      What phone number would you like to be contacted at? [phone]  How would you like to obtain your AVS? MyChart    FOLLOW-UP VISIT    Patient Name: Zander Stallworth      : 1955     Age: 66 year old        ______________________________________________________________________________     Chief Complaint   Patient presents with     Cancer     Radiation follow up     There were no vitals taken for this visit.     Date Radiation Completed: Prostate Cancer: Pelvis 7000 cGy completed 20    Pain  Denies    Labs  Other Labs: Yes: 2021 PSA    Imaging  None      PSA:   PSA   Date Value Ref Range Status   2021 <0.01 0 - 4 ug/L Final     Comment:     Assay Method:  Chemiluminescence using Siemens Vista analyzer   2020 <0.01 0 - 4 ug/L Final     Comment:     Assay Method:  Chemiluminescence using Siemens Vista analyzer   2020 <0.01 0 - 4 ug/L Final     Comment:     Assay Method:  Chemiluminescence using Siemens Vista analyzer   2019 0.34 0 - 4 ug/L Final     Comment:     Assay Method:  Chemiluminescence using Siemens Vista analyzer     PSA Tumor Marker   Date Value Ref Range Status   2021 <0.01 0.00 - 4.00 ug/L Final     Testosterone Level:   Testosterone Total   Date Value Ref Range Status   2019 275 240 - 950 ng/dL Final     Comment:     This test was developed and its performance characteristics determined by the   [facility],  Special Chemistry Laboratory. It has   not been cleared or approved by the FDA. The laboratory is regulated under   CLIA as qualified to perform high-complexity testing. This test is used for   clinical purposes. It should not be regarded as investigational or for   research.         On-Study AUA Symptom Score (PQ)  AUA (American Urological Association) Symptom Score  1. Over the past month, how often have you had a sensation of not emptying  your bladder completely after you finished urinating?: Not at all  2. Over the past month, how often have you had to urinate again less than two hours after you finished urinating?: More than half the time  3. Over the past month, how often have you found you stopped and started again several times when you urinated?: Not at all  4. Over the past month, how often have you found it difficult to postpone urination?: Less than 1 time in 5  5. Over the past month, how often have you had a weak urine stream?: Not at all  6. Over the past month, how often have you had to push or strain to begin urinating?: Not at all  7. Over the past month, how many times did you typically get up to urinate from the time you went to bed at night until the time you got up in the morning?: 1 time  AUA Score: 6    Diarrhea: 0- None    Constipation: 0- None      Other Appointments: No    MD Name:  Appointment Date:    MD Name: Appointment Date:   MD Name: Appointment Date:   Other Appointment Notes:     Residual Radiation side effect: Occ. Has had some blood in his urine would ;val to talk about this.     Additional Instructions:     Nurse face-to-face time: Level 3:  10 min phone  time

## 2022-01-05 NOTE — PROGRESS NOTES
[facility] [place]  Radiation Oncology Follow-up Note  2022     Zander Stallworth   MRN: [id number]  : 1955      DISEASE TREATED: Recurrent high risk prostate cancer, high-grade neuroendocrine differentiation. [id number], Hallie 3+4=7, PSA=0.2.  Axumin PET/CT scan for LN+     INTERVAL SINCE COMPLETION OF RADIATION THERAPY: 22 months (completed on 3/4/2020)     TYPE OF RADIATION THERAPY ADMINISTERED: 7000 cGy in 35 fractions (4600cGy to LN + Boost)       HORMONAL THERAPY: Plan Indefinite. Lupron 22.5 mg given 2019, 45 mg given 3/18/2020, 2020, 2021)     SUBJECTIVE: Mr. Stallworth is a 65 year old man with biochemically and nodally recurrent prostate cancer. His pre-op PSA was 2.7, with highest Hallie's score of 5+3=8. He underwent upfront treatment with RALP and BLND in 2017. There was no evidence of EPE SVI, federica involvement (0/3); and negative margins were achieved.      His post-op PSA was initially undetectable. It mariano to a value of 0.1 on 2/15/2019, and increased to 0.2 on 2019. Axumin PET/CT on 2019 then showed FDG avidity in one right common iliac node and an right internal iliac node. He underwent repeat PLND on 2019, with pathology revealing high grade neuroendocrine tumor in 2/5 right common iliac nodes dissected. This was deemed to be recurrence of his prostate cancer, and he proceeded to undergo salvage radiotherapy with indefinite ADT as described above.      Mr. Stallworth tolerated radiotherapy relatively well, developing only the expected side effect of increased urinary symptoms and diarrhea. He has since had significant interval recovery from these symptom.  His last Lupron shot was 10-1-21. He was seen in follow-up with his medical oncologist, Dr. Rivera, 1-3-22 at which time they discussed stopping ADT until the testosterone starts to rise again.     On interview today, the patient states that he is well. He has intermittent  hematuria. He denies use of aspirin, NSAIDS or blood thinners. He is experiencing hot flashes several times a day. His AUA Score remains 7/35. He complains of weight gain and penile shrinkage. His bilateral breast tissue has not increased in size and does not bother him. He has decided against breast radiation.     OBJECTIVE: A/Ox3. NAD. Pleasant sounding on the phone    PSA     12/23/2021 <0.01   10/1/2021 Lupron 45mg   05/03/2021 <0.01   4/2/2021 Lupron 45 mg   11/13/2020 <0.01   9/16/2020 Lupron 45 mg   5/8/2020 <0.01   3/18/2020 Lupron 45 mg   3/4/2020 Salvage Radiotherapy   12/17/2019 Lupron 22.5 mg started   12/17/2020 (pre-RT) 0.34   9/30/2019 0.2   8/29/2019 0.2   5/20/2019 0.1   2/14/2019 0.1   8/14/2018 <0.1   2/12/2018 <0.1   11/14/2017 (post-op) <0.1   8/7/2017 RARP   5/12/2017 2.7   6/10/2015 1.99   9/26/2014 1.64      IMPRESSION: PSA remains undetectable. No clinical evidence for recurrent disease at this time. He complains of hematuria and penile shrinkage.     RECOMMENDATIONS:    Refer to urology Dr. Owens for evaluation of hematuria and discussion of penile shrinkage  Return to clinic in 6 months   Repeat PSA in 6 months     Brissa Wu MD PGY2     Telephone time: 12 minutes. 30 minutes were spent in preparing for, documenting and reviewing labs/imaging for today's visit     I talked to the patient with the resident.  I agree with the resident's note and plan of care.       ABRAHAM Schreiber M.D.  Department of Radiation Oncology  [facility]

## 2022-01-17 ENCOUNTER — PRE VISIT (OUTPATIENT)
Dept: UROLOGY | Facility: CLINIC | Age: 67
End: 2022-01-17
Payer: MEDICARE

## 2022-01-17 NOTE — CONFIDENTIAL NOTE
Reason for visit: Follow up (previously Dr. Ronquillo's patient)     Relevant information: Prostate cancer s/p prostatectomy 2017; elevated PSA; hematuria after XRT    Records/imaging/labs/orders: Last PSA done 12/23/2021    Pt called: N/A    At Rooming: Video

## 2022-02-08 ENCOUNTER — VIRTUAL VISIT (OUTPATIENT)
Dept: UROLOGY | Facility: CLINIC | Age: 67
End: 2022-02-08
Attending: RADIOLOGY
Payer: MEDICARE

## 2022-02-08 VITALS — HEIGHT: 70 IN | BODY MASS INDEX: 27.92 KG/M2 | WEIGHT: 195 LBS

## 2022-02-08 DIAGNOSIS — C61 MALIGNANT NEOPLASM OF PROSTATE (H): ICD-10-CM

## 2022-02-08 PROCEDURE — 99213 OFFICE O/P EST LOW 20 MIN: CPT | Mod: 95 | Performed by: UROLOGY

## 2022-02-08 ASSESSMENT — MIFFLIN-ST. JEOR: SCORE: 1660.76

## 2022-02-08 ASSESSMENT — PAIN SCALES - GENERAL: PAINLEVEL: NO PAIN (0)

## 2022-02-08 NOTE — PROGRESS NOTES
Zander Stallworth is a 66 year old male who is being evaluated via a billable video visit.      How would you like to obtain your AVS? MyChart  If the video visit is dropped, the invitation should be resent by: Text to cell phone: [phone]  Will anyone else be joining your video visit? No    Video Start Time: 3:52 PM    CHIEF COMPLAINT   It was my pleasure to see Zander Stallworth who is a 66 year old male for follow-up of prostate cancer.      HPI   Zander Stallworth is a very pleasant 66 year old male who presents with a history of prostate cancer. He underwent RALP on 5/7/2017, with pre-op PSA 2.7 and found to have [id number] Hallie 3+4=7 disease. He subsequently had PSA rise 2/2019. Axumin PET at that time demonstrated avid node in the right pelvic nodes. Had robotic PLND 11/2019 with high grade neuroendocrine tumor in 2 nodes. He underwent salvage radiotherapy which was completed 3/4/2020. He also had concurrent ADT. He continues to follow with Dr. Rivera and Dr. Schreiber. His most recent PSA is <0.01. Per recent discussion with Dr. Rivera, he is considering holding ADT to see if his PSA rises.     His visit today is primary to discuss gross hematuria that he has noted over the past few months. This has been transient episodes that have occurred intermittently. He noted these primarily in the context of having bowel movements. No pain or dysuria.     He also discusses concern about penile tethering or shortening. Of note, has also noted some weight gain, associated with the ADT.         Allergies:   Amoxicillin-pot clavulanate, Erythromycin, and Sildenafil         Review of Systems:  From intake questionnaire     Skin: negative  Eyes: negative  Ears/Nose/Throat: negative  Respiratory: No shortness of breath, dyspnea on exertion, cough, or hemoptysis  Cardiovascular: No chest pain or palpitations  Gastrointestinal: negative; no nausea/vomiting, constipation or diarrhea  Genitourinary: as per HPI  Musculoskeletal: negative  Neurologic:  negative  Psychiatric: negative  Hematologic/Lymphatic/Immunologic: negative  Endocrine: negative         Physical Exam:     Vitals:  No vitals were obtained today due to virtual visit.    Physical Exam   GENERAL: Healthy, alert and no distress  EYES: Eyes grossly normal to inspection.  No discharge or erythema, or obvious scleral/conjunctival abnormalities.  RESP: No audible wheeze, cough, or visible cyanosis.  No visible retractions or increased work of breathing.    SKIN: Visible skin clear. No significant rash, abnormal pigmentation or lesions.  NEURO: Cranial nerves grossly intact.  Mentation and speech appropriate for age.  PSYCH: Mentation appears normal, affect normal/bright, judgement and insight intact, normal speech and appearance well-groomed.      Outside and Past Medical records:    Review of the result(s) of each unique test - PSA, pathology         Assessment and Plan:     Assessment: 66 year old male with prostate cancer, s/p RALP 2017, repeat PLND 11/2019 with positive nodes and subsequent radiotherapy with ADT. We discussed the notion of stopping ADT at this point to assess PSA response, as he had discussed with Dr. Rivera. We reviewed the possibility that PSA may rise, at which point he would need to restart hormones. That said, stopping this transiently is reasonable.     We primarily discussed his hematuria and potential for radiation cystitis, vs possible malignancy. Will plan for office cystoscopy.     We discussed that his penile tethering is likely related to scarring from prior surgery and radiation, and may also be impacted by any weight gain over the past couple of years.    Plan:  Schedule for office cystoscopy - [facility] or [facility] would be fine    Video-Visit Details    Type of service:  Video Visit    Video End Time:4:14 PM    Originating Location (pt. Location): Home    Distant Location (provider location):  [facility] UROLOGY AND [facility] FOR PROSTATE AND UROLOGIC CANCERS    Platform used for  Video Visit: Doximity    23 minutes spent on the date of the encounter including direct interaction with the patient, performing chart review, history and exam, documentation and further activities as noted above.    Louis Owens MD  Urology  [facility]

## 2022-02-08 NOTE — NURSING NOTE
"Chief Complaint   Patient presents with     Follow Up     Prostate cancer s/p prostatectomy 2017; elevated PSA; hematuria after XRT       Height 1.778 m (5' 10\"), weight 88.5 kg (195 lb). Body mass index is 27.98 kg/m .    Patient Active Problem List   Diagnosis     Acquired deformity of ankle and foot     Blepharitis     Dry eyes     Herpes simplex disciform keratitis     Hypercholesterolemia     Lesion of plantar nerve     Malabsorption of glucose     Malignant neoplasm of prostate (H)     Metatarsalgia     Presbyopia     Sensorineural hearing loss, bilateral     Prostate cancer (H)     Diabetes mellitus, type 2 (H)       Allergies   Allergen Reactions     Amoxicillin-Pot Clavulanate      Itching      Erythromycin      Sildenafil      Visual changes with sildenafil.        Current Outpatient Medications   Medication Sig Dispense Refill     diclofenac (VOLTAREN) 1 % topical gel Apply topically as needed       fexofenadine (ALLEGRA) 60 MG tablet Take 60 mg by mouth every morning        fluticasone (FLONASE) 50 MCG/ACT nasal spray Spray 2 sprays into both nostrils daily        hydrocortisone (ANUCORT-HC) 25 MG suppository Place 25 mg rectally 2 times daily PRN       metFORMIN (GLUCOPHAGE) 500 MG tablet Take 500 mg by mouth daily (with breakfast)        Multiple Vitamins-Minerals (CENTRUM SILVER PO) Take by mouth every morning        simvastatin (ZOCOR) 10 MG tablet At Bedtime          Social History     Tobacco Use     Smoking status: Never Smoker     Smokeless tobacco: Never Used   Substance Use Topics     Alcohol use: Yes     Comment: 10 drinks/week     Drug use: Never       PEDRO Ratliff  2/8/2022  3:16 PM  "

## 2022-03-10 ENCOUNTER — OFFICE VISIT (OUTPATIENT)
Dept: UROLOGY | Facility: CLINIC | Age: 67
End: 2022-03-10
Payer: MEDICARE

## 2022-03-10 VITALS
SYSTOLIC BLOOD PRESSURE: 124 MMHG | HEART RATE: 57 BPM | WEIGHT: 191 LBS | BODY MASS INDEX: 27.35 KG/M2 | DIASTOLIC BLOOD PRESSURE: 70 MMHG | HEIGHT: 70 IN | OXYGEN SATURATION: 99 %

## 2022-03-10 DIAGNOSIS — C61 MALIGNANT NEOPLASM OF PROSTATE (H): ICD-10-CM

## 2022-03-10 DIAGNOSIS — N30.40 RADIATION CYSTITIS: ICD-10-CM

## 2022-03-10 DIAGNOSIS — R31.0 GROSS HEMATURIA: Primary | ICD-10-CM

## 2022-03-10 LAB
ALBUMIN UR-MCNC: NEGATIVE MG/DL
APPEARANCE UR: CLEAR
BILIRUB UR QL STRIP: NEGATIVE
COLOR UR AUTO: YELLOW
GLUCOSE UR STRIP-MCNC: NEGATIVE MG/DL
HGB UR QL STRIP: NEGATIVE
KETONES UR STRIP-MCNC: NEGATIVE MG/DL
LEUKOCYTE ESTERASE UR QL STRIP: NEGATIVE
NITRATE UR QL: NEGATIVE
PH UR STRIP: 7.5 [PH] (ref 5–7)
SP GR UR STRIP: 1.01 (ref 1–1.03)
UROBILINOGEN UR STRIP-ACNC: 0.2 E.U./DL

## 2022-03-10 PROCEDURE — 81003 URINALYSIS AUTO W/O SCOPE: CPT | Mod: QW | Performed by: UROLOGY

## 2022-03-10 PROCEDURE — 52000 CYSTOURETHROSCOPY: CPT | Performed by: UROLOGY

## 2022-03-10 PROCEDURE — 88112 CYTOPATH CELL ENHANCE TECH: CPT | Performed by: PATHOLOGY

## 2022-03-10 RX ORDER — LIDOCAINE HYDROCHLORIDE 20 MG/ML
JELLY TOPICAL ONCE
Status: COMPLETED | OUTPATIENT
Start: 2022-03-10 | End: 2022-03-10

## 2022-03-10 RX ADMIN — LIDOCAINE HYDROCHLORIDE 5 ML: 20 JELLY TOPICAL at 13:45

## 2022-03-10 ASSESSMENT — PAIN SCALES - GENERAL: PAINLEVEL: NO PAIN (0)

## 2022-03-10 NOTE — PATIENT INSTRUCTIONS
"AFTER YOUR CYSTOSCOPY  ?  ?  You have just completed a cystoscopy, or \"cysto\", which allowed your physician to learn more about your bladder (or to remove a stent placed after surgery). We suggest that you continue to avoid caffeine, fruit juice, and alcohol for the next 24 hours, however, you are encouraged to return to your normal activities.  ?  ?  A few things that are considered normal after your cystoscopy:  ?  * small amount of bleeding (or spotting) that clears within the next 24 hours  ?  * slight burning sensation with urination  ?  * sensation of needing to void (urinate) more frequently  ?  * the feeling of \"air\" in your urine  ?  * mild discomfort that is relieved with Tylenol    * bladder spasms  ?  ?  ?  Please contact our office promptly if you:  ?  * develop a fever above 101 degrees  ?  * are unable to urinate  ?  * develop bright red blood that does not stop  ?  * experience severe pain or swelling  ?  ?  ?  And of course, please contact our office with any concerns or questions [phone]  ?    AFTER YOUR CYSTOSCOPY        You have just completed a cystoscopy, or \"cysto\", which allowed your physician to learn more about your bladder (or to remove a stent placed after surgery). We suggest that you continue to avoid caffeine, fruit juice, and alcohol for the next 24 hours, however, you are encouraged to return to your normal activities.         A few things that are considered normal after your cystoscopy:     * Small amount of bleeding (or spotting) that clears within the next 24 hours     * Slight burning sensation with urination     * Sensation to of needing to avoid more frequently     * The feeling of \"air\" in your urine     * Mild discomfort that is relieved with Tylenol        Please contact our office promptly if you:     * Develop a fever above 101 degrees     * Are unable to urinate     * Develop bright red blood that does not stop     * Severe pain or swelling         Please contact " our office with any concerns or questions @[facility].

## 2022-03-10 NOTE — NURSING NOTE
Chief Complaint   Patient presents with     malignant neoplasm of prostate     in office cystoscopy today   Prior to the start of the procedure and with procedural staff participation, I verbally confirmed the patient s identity using two indicators, relevant allergies, that the procedure was appropriate and matched the consent or emergent situation, and that the correct equipment/implants were available. Immediately prior to starting the procedure I conducted the Time Out with the procedural staff and re-confirmed the patient s name, procedure, and site/side. I have wiped the patient off with the povidone-Iodine solution, draped them,  used Lidocaine hydrochloride jelly, and instilled sterile water into the bladder. (The Joint Commission universal protocol was followed.)  Yes    Sedation (Moderate or Deep): None  5mL 2% lidocaine hydrochloride Urojet instilled into urethra.    NDC# [address]-4  Lot #: [id number]  Expiration Date:  9-23  Haleigh Kapadia LPN

## 2022-03-10 NOTE — PROGRESS NOTES
"  CHIEF COMPLAINT   It was my pleasure to see Zander Stallworth who is a 66 year old male for follow-up of gross hematuria and prostate cancer.      HPI  Zander Stallworth is a very pleasant 66 year old male who presents with a history of prostate cancer. He underwent RALP on 5/7/2017, with pre-op PSA 2.7 and found to have [id number] [place] 3+4=7 disease. He subsequently had PSA rise 2/2019. Axumin PET at that time demonstrated avid node in the right pelvic nodes. Had robotic PLND 11/2019 with high grade neuroendocrine tumor in 2 nodes. He underwent salvage radiotherapy which was completed 3/4/2020. He also had concurrent ADT. He continues to follow with Dr. Rivera and Dr. Schreiber. His most recent PSA is <0.01. Per recent discussion with Dr. Rivera, he is considering holding ADT to see if his PSA rises.     He recently had some episodes of gross hematuria. This has not recurred since our recent virtual visit.   This has been transient episodes that have occurred intermittently. He noted these primarily in the context of having bowel movements. No pain or dysuria.      He also discusses concern about penile tethering or shortening. Of note, has also noted some weight gain, associated with the ADT.    PHYSICAL EXAM  Patient is a 66 year old  male   Vitals: Blood pressure 124/70, pulse 57, height 1.778 m (5' 10\"), weight 86.6 kg (191 lb), SpO2 99 %.  General Appearance Adult: Body mass index is 27.41 kg/m .  Alert, no acute distress, oriented  Lungs: no respiratory distress, or pursed lip breathing  Abdomen: soft, nontender, no organomegaly or masses  Back: no CVAT  Neuro: Alert, oriented, speech and mentation normal  Psych: affect and mood normal  : penis, scrotum, testes normal    OFFICE CYSTOSCOPY 3/10/2022    Pre-procedure diagnosis:  Gross Hematuria  Post-procedure diagnosis:  Radiation cystitis  Procedure performed:  Cystourethroscopy  Surgeon:    Louis Owens MD  Anesthesia:    Local    Description of procedure:   After fully " informed, voluntary consent was obtained, the patient was brought into the procedure room, identified and placed in a supine position on the cystoscopy table.  The groin/scrotum were prepped with betadine and draped in a sterile fashion.  Urojet lidocaine gel was introduced.  A 15F flexible cystoscope was inserted into the urethra, and the bladder and urethra were examined in a systematic manner.  The patient tolerated the procedure well and there were no complications.      Cystoscopic findings:  The urethra was normal without strictures.  The prostate was surgically absent. The external sphincter coapted well and the bladder neck was open. The bladder was completely surveyed.  There was mild trabeculation.  There were no neoplasms, stones, or diverticula identifed.  The ureteric orifices were  normal in position and number and effluxing clear urine. Hypervascularity noted with erythematous changes consistent with radiation cystitis. No active bleeding. No areas suspicious for malignancy.    ASSESSMENT and PLAN  66 year old male with prostate cancer, s/p RALP 2017, repeat PLND 11/2019 with positive nodes and subsequent radiotherapy with ADT. Follows with Dr. Rivera regarding his prostate cancer.     Cystoscopy today consistent with radiation cystitis. We discussed this is common after post-prostatectomy radiation, and that no further intervention is indicated at this time. That said, we also reviewed the potential for bleeding episodes in the future and discussed possible interventions should that occur. Both patient and his spouse asked several excellent questions that were answered today.      Plan:  - Continue follow-up with medical oncology  - Follow-up with urology on as needed basis, if hematuria worsens or progresses    Louis Owens MD  Urology  [facility]

## 2022-03-14 LAB
PATH REPORT.COMMENTS IMP SPEC: NORMAL
PATH REPORT.FINAL DX SPEC: NORMAL
PATH REPORT.GROSS SPEC: NORMAL
PATH REPORT.MICROSCOPIC SPEC OTHER STN: NORMAL
PATH REPORT.RELEVANT HX SPEC: NORMAL

## 2022-04-10 ENCOUNTER — HEALTH MAINTENANCE LETTER (OUTPATIENT)
Age: 67
End: 2022-04-10

## 2022-06-05 ENCOUNTER — HEALTH MAINTENANCE LETTER (OUTPATIENT)
Age: 67
End: 2022-06-05

## 2022-06-08 ENCOUNTER — TELEPHONE (OUTPATIENT)
Dept: RADIATION ONCOLOGY | Facility: CLINIC | Age: 67
End: 2022-06-08
Payer: MEDICARE

## 2022-06-08 NOTE — TELEPHONE ENCOUNTER
Attempted to call Zander at [phone], disconnected sound response. Attempted to call Yolanda's number also had disconnected sound.     Need to reschedule Zander's appointment with Dr. Schreiber on 7/12/22. Will send [employer] Message.

## 2022-07-06 DIAGNOSIS — C61 PROSTATE CANCER (H): Primary | ICD-10-CM

## 2022-07-07 ENCOUNTER — LAB (OUTPATIENT)
Dept: LAB | Facility: CLINIC | Age: 67
End: 2022-07-07
Attending: INTERNAL MEDICINE
Payer: MEDICARE

## 2022-07-07 DIAGNOSIS — C61 PROSTATE CANCER (H): ICD-10-CM

## 2022-07-07 DIAGNOSIS — E11.9 DIABETES MELLITUS WITHOUT COMPLICATION (H): Primary | ICD-10-CM

## 2022-07-07 LAB
ALBUMIN SERPL-MCNC: 4 G/DL (ref 3.4–5)
ALP SERPL-CCNC: 75 U/L (ref 40–150)
ALT SERPL W P-5'-P-CCNC: 33 U/L (ref 0–70)
ANION GAP SERPL CALCULATED.3IONS-SCNC: 11 MMOL/L (ref 3–14)
AST SERPL W P-5'-P-CCNC: 20 U/L (ref 0–45)
BASOPHILS # BLD AUTO: 0 10E3/UL (ref 0–0.2)
BASOPHILS NFR BLD AUTO: 1 %
BILIRUB SERPL-MCNC: 0.6 MG/DL (ref 0.2–1.3)
BUN SERPL-MCNC: 15 MG/DL (ref 7–30)
CALCIUM SERPL-MCNC: 8.8 MG/DL (ref 8.5–10.1)
CHLORIDE BLD-SCNC: 106 MMOL/L (ref 94–109)
CO2 SERPL-SCNC: 24 MMOL/L (ref 20–32)
CREAT SERPL-MCNC: 0.72 MG/DL (ref 0.66–1.25)
EOSINOPHIL # BLD AUTO: 0.1 10E3/UL (ref 0–0.7)
EOSINOPHIL NFR BLD AUTO: 2 %
ERYTHROCYTE [DISTWIDTH] IN BLOOD BY AUTOMATED COUNT: 13.2 % (ref 10–15)
GFR SERPL CREATININE-BSD FRML MDRD: >90 ML/MIN/1.73M2
GLUCOSE BLD-MCNC: 147 MG/DL (ref 70–99)
HBA1C MFR BLD: 6.7 % (ref 0–5.6)
HCT VFR BLD AUTO: 37.6 % (ref 40–53)
HGB BLD-MCNC: 12.6 G/DL (ref 13.3–17.7)
IMM GRANULOCYTES # BLD: 0 10E3/UL
IMM GRANULOCYTES NFR BLD: 1 %
LYMPHOCYTES # BLD AUTO: 1 10E3/UL (ref 0.8–5.3)
LYMPHOCYTES NFR BLD AUTO: 16 %
MCH RBC QN AUTO: 28.6 PG (ref 26.5–33)
MCHC RBC AUTO-ENTMCNC: 33.5 G/DL (ref 31.5–36.5)
MCV RBC AUTO: 86 FL (ref 78–100)
MONOCYTES # BLD AUTO: 0.6 10E3/UL (ref 0–1.3)
MONOCYTES NFR BLD AUTO: 9 %
NEUTROPHILS # BLD AUTO: 4.6 10E3/UL (ref 1.6–8.3)
NEUTROPHILS NFR BLD AUTO: 71 %
NRBC # BLD AUTO: 0 10E3/UL
NRBC BLD AUTO-RTO: 0 /100
PLATELET # BLD AUTO: 169 10E3/UL (ref 150–450)
POTASSIUM BLD-SCNC: 4.1 MMOL/L (ref 3.4–5.3)
PROT SERPL-MCNC: 7.2 G/DL (ref 6.8–8.8)
PSA SERPL-MCNC: <0.01 UG/L (ref 0–4)
RBC # BLD AUTO: 4.4 10E6/UL (ref 4.4–5.9)
SODIUM SERPL-SCNC: 141 MMOL/L (ref 133–144)
WBC # BLD AUTO: 6.4 10E3/UL (ref 4–11)

## 2022-07-07 PROCEDURE — 83036 HEMOGLOBIN GLYCOSYLATED A1C: CPT

## 2022-07-07 PROCEDURE — 80053 COMPREHEN METABOLIC PANEL: CPT

## 2022-07-07 PROCEDURE — 84153 ASSAY OF PSA TOTAL: CPT

## 2022-07-07 PROCEDURE — 85025 COMPLETE CBC W/AUTO DIFF WBC: CPT

## 2022-07-07 PROCEDURE — 84403 ASSAY OF TOTAL TESTOSTERONE: CPT

## 2022-07-07 PROCEDURE — 36415 COLL VENOUS BLD VENIPUNCTURE: CPT

## 2022-07-07 NOTE — NURSING NOTE
Chief Complaint   Patient presents with     Labs Only     Lab only appointment.  Labs drawn by venipuncture by rn in lab.      Yola Neves RN

## 2022-07-10 LAB — TESTOST SERPL-MCNC: 210 NG/DL (ref 240–950)

## 2022-07-12 NOTE — PROGRESS NOTES
Zander is a 66 year old who is being evaluated via a billable video visit.      How would you like to obtain your AVS? MyChart  If the video visit is dropped, the invitation should be resent by: Send to e-mail at: [email or web address]  Will anyone else be joining your video visit? No      Video-Visit Details    Video Start Time: 9:50 AM    Type of service:  Video Visit    Video End Time:10:20 AM    Originating Location (pt. Location): Home    Distant Location (provider location):  [facility] RADIATION ONCOLOGY     Platform used for Video Visit: Hers    [facility] [place]  Radiation Oncology Follow-up Note       Zander Stallworth   MRN: [id number]  : 1955      DISEASE TREATED: Recurrent high risk prostate cancer, high-grade neuroendocrine differentiation. [id number], Hallie 3+4=7, PSA=0.2.  Axumin PET/CT scan for LN+     INTERVAL SINCE COMPLETION OF RADIATION THERAPY: 28 months (completed on 3/4/2020)     TYPE OF RADIATION THERAPY ADMINISTERED: 7000 cGy in 35 fractions (4600cGy to LN + Boost)       HORMONAL THERAPY: Plan Indefinite. Lupron 22.5 mg given 2019, 45 mg given 3/18/2020, 2020, 2021, 10/1/2021)     SUBJECTIVE: Mr. Stallworth is a 66 year old man with biochemically and nodally recurrent prostate cancer. His pre-op PSA was 2.7, with highest Hallie's score of 5+3=8. He underwent upfront treatment with RALP and BLND in 2017. There was no evidence of EPE SVI, federica involvement (0/3); and negative margins were achieved.      His post-op PSA was initially undetectable. It mariano to a value of 0.1 on 2/15/2019, and increased to 0.2 on 2019. Axumin PET/CT on 2019 then showed FDG avidity in one right common iliac node and an right internal iliac node. He underwent repeat PLND on 2019, with pathology revealing high grade neuroendocrine tumor in 2/5 right common iliac nodes dissected. This was deemed to be recurrence of his  prostate cancer, and he proceeded to undergo salvage radiotherapy in conjunction with ADT. His last Lupron shot was on 10/1/2021. He was seen in follow-up with his medical oncologist, Dr. Rivera, 1/3/2022 at which time they discussed stopping ADT until the testosterone starts to rise again.    He was last seen for follow up 6 months ago, at which time he reported mild intermittent hematuria. He established care with Dr. Owens and underwent cystoscopy on 3/10/2022, with findings consistent with radiation cystitis.     On interview today, the patient states that he is well. His AUA Score is 4/35 while it was previously 7/35. He has diminished hot flash and night sweat. His libido is recovering. He cannot achieve an erection related to surgery, and he is not considering any intervention at the moment.     OBJECTIVE:   Vitals:  No vitals were obtained today due to virtual visit.    Physical Exam   GENERAL: Healthy, alert and no distress  EYES: Eyes grossly normal to inspection.  No discharge or erythema, or obvious scleral/conjunctival abnormalities.  RESP: No audible wheeze, cough, or visible cyanosis.  No visible retractions or increased work of breathing.    SKIN: Visible skin clear. No significant rash, abnormal pigmentation or lesions.  NEURO: Cranial nerves grossly intact.  Mentation and speech appropriate for age.  PSYCH: Mentation appears normal, affect normal/bright, judgement and insight intact, normal speech and appearance well-groomed.    PSA     07/07/2022 <0.01   12/23/2021 <0.01   10/01/2021 Lupron 45mg   05/03/2021 <0.01   04/02/2021 Lupron 45 mg   11/13/2020 <0.01   09/16/2020 Lupron 45 mg   05/08/2020 <0.01   03/18/2020 Lupron 45 mg   03/04/2020 Salvage Radiotherapy   12/17/2019 Lupron 22.5 mg started   12/17/2020 (pre-RT) 0.34   09/30/2019 0.2   08/29/2019 0.2   05/20/2019 0.1   02/14/2019 0.1   08/14/2018 <0.1   02/12/2018 <0.1   11/14/2017 (post-op) <0.1   08/07/2017 RARP   05/12/2017 2.7    06/10/2015 1.99   09/26/2014 1.64      IMPRESSION: PSA remains undetectable. No clinical evidence for recurrent disease at this time.      RECOMMENDATIONS:    Repeat PSA in 6 months  Follow up in 6 months with NP  Continue following with Dr. Rivera as scheduled (video visit 7/22/22)     The patient was seen and discussed with staff, Dr. Schreiber.    Jose Gilliland MD  Resident, PGY-5  Department of Radiation Oncology  [facility]      I saw the patient with the resident.  I agree with the resident's note and plan of care.      ABRAHAM Schreiber M.D.  Department of Radiation Oncology  [facility]

## 2022-07-19 ENCOUNTER — VIRTUAL VISIT (OUTPATIENT)
Dept: RADIATION ONCOLOGY | Facility: CLINIC | Age: 67
End: 2022-07-19
Attending: RADIOLOGY
Payer: MEDICARE

## 2022-07-19 DIAGNOSIS — C61 PROSTATE CANCER (H): Primary | ICD-10-CM

## 2022-07-19 NOTE — PROGRESS NOTES
FOLLOW-UP VISIT    Patient Name: Zander Stallworth      : 1955     Age: 66 year old        ______________________________________________________________________________     Chief Complaint   Patient presents with     Prostate Cancer     28 month fup for rad therapy to prostate bed     There were no vitals taken for this visit.     Date Radiation Completed: 3/4/2020    Pain  Denies    Labs  Other Labs: Yes: PSA  <0.01  Testosterone:  210    Imaging  None        PSA:   PSA   Date Value Ref Range Status   2021 <0.01 0 - 4 ug/L Final     Comment:     Assay Method:  Chemiluminescence using Siemens Vista analyzer   2020 <0.01 0 - 4 ug/L Final     Comment:     Assay Method:  Chemiluminescence using Siemens Vista analyzer   2020 <0.01 0 - 4 ug/L Final     Comment:     Assay Method:  Chemiluminescence using Siemens Vista analyzer   2019 0.34 0 - 4 ug/L Final     Comment:     Assay Method:  Chemiluminescence using Siemens Vista analyzer     PSA Tumor Marker   Date Value Ref Range Status   2022 <0.01 0.00 - 4.00 ug/L Final   2021 <0.01 0.00 - 4.00 ug/L Final     Testosterone Level:   Testosterone Total   Date Value Ref Range Status   2022 210 (L) 240 - 950 ng/dL Final   2019 275 240 - 950 ng/dL Final     Comment:     This test was developed and its performance characteristics determined by the   [facility],  Special [facility] Laboratory. It has   not been cleared or approved by the FDA. The laboratory is regulated under   CLIA as qualified to perform high-complexity testing. This test is used for   clinical purposes. It should not be regarded as investigational or for   research.         On-Study AUA Symptom Score (PQ)       Diarrhea: 0- None    Constipation: 0- None      Other Appointments:     MD Name: Miguel Appointment Date:    MD Name: Appointment Date:   MD Name: Appointment Date:   Other Appointment Notes:     Residual Radiation side effect: ED      Additional Instructions:     Nurse face-to-face time: Level 3:  10 min face to face time        HPI      ROS

## 2022-07-19 NOTE — LETTER
2022         RE: Zander Stallworth  [address]  [place] [address]        Dear Colleague,    Thank you for referring your patient, Zander Stallworth, to the [facility] RADIATION ONCOLOGY. Please see a copy of my visit note below.    Zander is a 66 year old who is being evaluated via a billable video visit.      How would you like to obtain your AVS? MyChart  If the video visit is dropped, the invitation should be resent by: Send to e-mail at: [email or web address]  Will anyone else be joining your video visit? No      Video-Visit Details    Video Start Time: 9:50 AM    Type of service:  Video Visit    Video End Time:10:20 AM    Originating Location (pt. 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 [id number], [place] 3+4=7, PSA=0.2.  Axumin PET/CT scan for LN+     INTERVAL SINCE COMPLETION OF RADIATION THERAPY: 28 months (completed on 3/4/2020)     TYPE OF RADIATION THERAPY ADMINISTERED: 7000 cGy in 35 fractions (4600cGy to LN + Boost)       HORMONAL THERAPY: Plan Indefinite. Lupron 22.5 mg given 2019, 45 mg given 3/18/2020, 2020, 2021, 10/1/2021)     SUBJECTIVE: Mr. Stallworth is a 66 year old man with biochemically and nodally recurrent prostate cancer. His pre-op PSA was 2.7, with highest [place]'s score of 5+3=8. He underwent upfront treatment with RALP and BLND in 2017. There was no evidence of EPE SVI, federica involvement (0/3); and negative margins were achieved.      His post-op PSA was initially undetectable. It mariano to a value of 0.1 on 2/15/2019, and increased to 0.2 on 2019. Axumin PET/CT on 2019 then showed FDG  avidity in one right common iliac node and an right internal iliac node. He underwent repeat PLND on 11/19/2019, with pathology revealing high grade neuroendocrine tumor in 2/5 right common iliac nodes dissected. This was deemed to be recurrence of his prostate cancer, and he proceeded to undergo salvage radiotherapy in conjunction with ADT. His last Lupron shot was on 10/1/2021. He was seen in follow-up with his medical oncologist, Dr. Rivera, 1/3/2022 at which time they discussed stopping ADT until the testosterone starts to rise again.    He was last seen for follow up 6 months ago, at which time he reported mild intermittent hematuria. He established care with Dr. Owens and underwent cystoscopy on 3/10/2022, with findings consistent with radiation cystitis.     On interview today, the patient states that he is well. His AUA Score is 4/35 while it was previously 7/35. He has diminished hot flash and night sweat. His libido is recovering. He cannot achieve an erection related to surgery, and he is not considering any intervention at the moment.     OBJECTIVE:   Vitals:  No vitals were obtained today due to virtual visit.    Physical Exam   GENERAL: Healthy, alert and no distress  EYES: Eyes grossly normal to inspection.  No discharge or erythema, or obvious scleral/conjunctival abnormalities.  RESP: No audible wheeze, cough, or visible cyanosis.  No visible retractions or increased work of breathing.    SKIN: Visible skin clear. No significant rash, abnormal pigmentation or lesions.  NEURO: Cranial nerves grossly intact.  Mentation and speech appropriate for age.  PSYCH: Mentation appears normal, affect normal/bright, judgement and insight intact, normal speech and appearance well-groomed.    PSA     07/07/2022 <0.01   12/23/2021 <0.01   10/01/2021 Lupron 45mg   05/03/2021 <0.01   04/02/2021 Lupron 45 mg   11/13/2020 <0.01   09/16/2020 Lupron 45 mg   05/08/2020 <0.01   03/18/2020 Lupron 45 mg   03/04/2020 Salvage  Radiotherapy   2019 Lupron 22.5 mg started   2020 (pre-RT) 0.34   2019 0.2   2019 0.2   2019 0.1   2019 0.1   2018 <0.1   2018 <0.1   2017 (post-op) <0.1   2017 RARP   2017 2.7   06/10/2015 1.99   2014 1.64      IMPRESSION: PSA remains undetectable. No clinical evidence for recurrent disease at this time.      RECOMMENDATIONS:    Repeat PSA in 6 months  Follow up in 6 months with NP  Continue following with Dr. Rivera as scheduled (video visit 22)     The patient was seen and discussed with staff, Dr. Schreiber.    Jose Gilliland MD  Resident, PGY-5  Department of Radiation Oncology  [facility]      I saw the patient with the resident.  I agree with the resident's note and plan of care.      ABRAHAM Schreiber M.D.  Department of Radiation Oncology  [facility]        FOLLOW-UP VISIT    Patient Name: Zander Stallworth      : 1955     Age: 66 year old        ______________________________________________________________________________     Chief Complaint   Patient presents with     Prostate Cancer     28 month fup for rad therapy to prostate bed     There were no vitals taken for this visit.     Date Radiation Completed: 3/4/2020    Pain  Denies    Labs  Other Labs: Yes: PSA  <0.01  Testosterone:  210    Imaging  None        PSA:   PSA   Date Value Ref Range Status   2021 <0.01 0 - 4 ug/L Final     Comment:     Assay Method:  Chemiluminescence using Siemens Vista analyzer   2020 <0.01 0 - 4 ug/L Final     Comment:     Assay Method:  Chemiluminescence using Siemens Vista analyzer   2020 <0.01 0 - 4 ug/L Final     Comment:     Assay Method:  Chemiluminescence using Siemens Vista analyzer   2019 0.34 0 - 4 ug/L Final     Comment:     Assay Method:  Chemiluminescence using Siemens Vista analyzer     PSA Tumor Marker   Date Value Ref Range Status   2022 <0.01 0.00 - 4.00 ug/L Final    12/23/2021 <0.01 0.00 - 4.00 ug/L Final     Testosterone Level:   Testosterone Total   Date Value Ref Range Status   07/07/2022 210 (L) 240 - 950 ng/dL Final   12/17/2019 275 240 - 950 ng/dL Final     Comment:     This test was developed and its performance characteristics determined by the   [facility],  Special [facility] Laboratory. It has   not been cleared or approved by the FDA. The laboratory is regulated under   CLIA as qualified to perform high-complexity testing. This test is used for   clinical purposes. It should not be regarded as investigational or for   research.         On-Study AUA Symptom Score (PQ)       Diarrhea: 0- None    Constipation: 0- None      Other Appointments:     MD Name: Miguel Appointment Date:    MD Name: Appointment Date:   MD Name: Appointment Date:   Other Appointment Notes:     Residual Radiation side effect: ED     Additional Instructions:     Nurse face-to-face time: Level 3:  10 min face to face time      Again, thank you for allowing me to participate in the care of your patient.        Sincerely,        Doug Schreiber MD

## 2022-07-22 ENCOUNTER — VIRTUAL VISIT (OUTPATIENT)
Dept: ONCOLOGY | Facility: CLINIC | Age: 67
End: 2022-07-22
Attending: INTERNAL MEDICINE
Payer: MEDICARE

## 2022-07-22 DIAGNOSIS — C61 PROSTATE CANCER (H): Primary | ICD-10-CM

## 2022-07-22 PROCEDURE — G0463 HOSPITAL OUTPT CLINIC VISIT: HCPCS | Mod: PN,RTG | Performed by: INTERNAL MEDICINE

## 2022-07-22 PROCEDURE — 99214 OFFICE O/P EST MOD 30 MIN: CPT | Mod: 95 | Performed by: INTERNAL MEDICINE

## 2022-07-22 NOTE — PROGRESS NOTES
Zander is a 66 year old who is being evaluated via a billable video visit.      If the video visit is dropped, the invitation should be resent by: Send to e-mail at: [email or web address]  Will anyone else be joining your video visit? Yes: Wife Yolanda will be Pt.. How would they like to receive their invitation? Text to cell phone: n/a        Video-Visit Details    Video Start Time: 11:18 AM    Type of service:  Video Visit    Video End Time:11:38 AM    Originating Location (pt. Location): Home    Distant Location (provider location):  [facility] CANCER [facility]     Platform used for Video Visit: [employer]       MEDICAL ONCOLOGY VIRTUAL VISIT NOTE      REFERRING PROVIDER: Dr. Ronquillo ([facility])  RADIATION ONCOLOGIST: Doug Schreiber MD    REASON FOR CURRENT VISIT: Recurrent prostate cancer    HISTORY OF PRESENT ILLNESS: Mr. Zander Stallworth is a 66 year old gentleman who was originally referred by Dr. Ronquillo of Urology for evaluation of rising PSA and concern for metastatic prostate cancer. He was diagnosed with prostate cancer initially in 2017 after an abnormal FIDE. TRUS with biopsy revealed [place] 5+3=8 prostate adenocarcinoma in 9 of 12 cores (invovling 60% of the tissue), with perineural invasion. PSA was 2.7 at that time. CT and bone scan showed no evidence of metastatic disease. He underwent radical prostatectomy on 8/7/17, pathology consistent with [place] 3+4, [id number]. PSA was undetectable after surgery, until February 2019 when PSA was found to be 0.1, up to 0.2 on 8/29/19 and stable at 0.2 on 9/30/19. PET/CT on 9/20/19 revealed findings suspicious for metastatic prostate cancer involving hypermetabolic lymph nodes measuring 0.8 x 0.6 cm in the right internal iliac chain along the right pelvic sidewall and 0.5 x 0.4 cm in the right common iliac chain just anterior to the L5 vertebral body. He was seen by Dr. Ronquillo in urology on 10/3/19 and discussed treatment options including radiation,  "hormone therapy and pelvic lymph node dissection.    He then underwent a bilateral robot-assisted laparoscopic pelvic lymph node dissection with Dr. Ronquillo on 11/19/19 and pathology showed - \"A. Lymph nodes, right pelvic, excision: - Seven benign lymph nodes (0/7)  B. Lymph nodes, right iliac, excision: - Six benign lymph nodes (0/6)  C. Lymph nodes, right common iliac, excision: - Two of five lymph nodes positive for metastatic prostatic adenocarcinoma (2/5); please see comment - Largest metastatic deposit: 1.1 cm - Extranodal extension is present - D. Lymph nodes, right deep obturator, excision: - Benign soft tissue - No lymph nodes identified E. Lymph nodes, left pelvic, excision: - Three benign lymph nodes (0/3).  COMMENT: Immunohistochemical stains show the tumor is positive for synaptophysin. Chromogranin and CD56 show scattered reactivity. While not confirmatory, these results suggest high grade neuroendocrine differentiation.\" Case was reviewed in  tumor board and recommendation made to give ADT+pelvic RT.    He started indefinite ADT with Lupron on 12/17/2019 and received salvage radiotherapy with Dr. Schreiber around March 2020.    --taken off January 2022 ( last shot October of 2021)   He is doing well overall.  He is exercising in the form of walking but not doing any resistance exercise.  He still has some hot flashes and mild fatigue but otherwise feels he is doing quite well.  He has no urinary symptoms.  Reports no blood.  He is seen today via video visit.    REVIEW OF SYSTEMS:  A full 14 point ROS was negative other than the symptoms noted above in the HPI.    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     Hypercholesterolemia 11/27/2009     Lesion of plantar nerve 04/02/2015     Malabsorption of glucose 11/18/2009     " Malignant neoplasm of prostate (H) 06/13/2017     Metatarsalgia 04/02/2015     Presbyopia 11/09/2010     Sensorineural hearing loss, bilateral 06/14/2012     Prostate cancer (H) 11/07/2019     Diabetes mellitus, type 2 (H) 01/03/2022     Resolved Ambulatory Problems     Diagnosis Date Noted     No Resolved Ambulatory Problems     Past Medical History:   Diagnosis Date     Allergic rhinitis      DM (diabetes mellitus) (H)      Hearing loss      HLD (hyperlipidemia)      Mumps      PAST SURGICAL HISTORY:  Past Surgical History:   Procedure Laterality Date     CYSTOSCOPY       DAVINCI LYMPHADENECTOMY N/A 11/19/2019    Procedure: davinci assisted bilateral pelvic lymph node dissection;  Surgeon: Ousmane Ronquillo MD;  Location: [facility] OR     parotid gland removed  2015    parotidectomy      PROSTATE SURGERY  08/07/2017    radical prostatectomy with bilateral pelvic lymph node dissection      SOCIAL HISTORY:   Social History     Tobacco Use     Smoking status: Never Smoker     Smokeless tobacco: Never Used   Substance Use Topics     Alcohol use: Yes     Comment: 10 drinks/week     Drug use: Never     FAMILY HISTORY:  - Father had dementia in his late 60s, as did his paternal grandfather.  - Father had colon cancer and later lung cancer.    ALLERGIES:  Allergies   Allergen Reactions     Amoxicillin-Pot Clavulanate      Itching      Erythromycin      Sildenafil      Visual changes with sildenafil.      CURRENT MEDICATIONS:  Current Outpatient Medications:      diclofenac (VOLTAREN) 1 % topical gel, Apply topically as needed, Disp: , Rfl:      fexofenadine (ALLEGRA) 60 MG tablet, Take 60 mg by mouth every morning , Disp: , Rfl:      fluticasone (FLONASE) 50 MCG/ACT nasal spray, Spray 2 sprays into both nostrils daily , Disp: , Rfl:      hydrocortisone (ANUCORT-HC) 25 MG suppository, Place 25 mg rectally 2 times daily PRN, Disp: , Rfl:      metFORMIN (GLUCOPHAGE) 500 MG tablet, Take 500 mg by mouth daily (with breakfast)  , Disp: , Rfl:      Multiple Vitamins-Minerals (CENTRUM SILVER PO), Take by mouth every morning , Disp: , Rfl:      simvastatin (ZOCOR) 10 MG tablet, At Bedtime , Disp: , Rfl:     PHYSICAL EXAMINATION:  Virtual visit.    LABORATORY DATA:    Latest Reference Range & Units 07/07/22 10:41   Sodium 133 - 144 mmol/L 141   Potassium 3.4 - 5.3 mmol/L 4.1   Chloride 94 - 109 mmol/L 106   Carbon Dioxide 20 - 32 mmol/L 24   Urea Nitrogen 7 - 30 mg/dL 15   Creatinine 0.66 - 1.25 mg/dL 0.72   GFR Estimate >60 mL/min/1.73m2 >90   Calcium 8.5 - 10.1 mg/dL 8.8   Anion Gap 3 - 14 mmol/L 11   Albumin 3.4 - 5.0 g/dL 4.0   Protein Total 6.8 - 8.8 g/dL 7.2   Alkaline Phosphatase 40 - 150 U/L 75   ALT 0 - 70 U/L 33   AST 0 - 45 U/L 20   Bilirubin Total 0.2 - 1.3 mg/dL 0.6   Hemoglobin A1C 0.0 - 5.6 % 6.7 (H)   PSA 0.00 - 4.00 ug/L <0.01   Testosterone Total 240 - 950 ng/dL 210 (L)   Glucose 70 - 99 mg/dL 147 (H)   WBC 4.0 - 11.0 10e3/uL 6.4   Hemoglobin 13.3 - 17.7 g/dL 12.6 (L)   Hematocrit 40.0 - 53.0 % 37.6 (L)   Platelet Count 150 - 450 10e3/uL 169   (H): Data is abnormally high  (L): Data is abnormally low      IMAGING STUDIES:  Reviewed Axumin PET Scan from 9/20/19:  - Notable only for two fluciclovine-avid lymph nodes measuring 0.8 x 0.6 cm in the right internal iliac chain along the right pelvic side wall and 0.5 x 0.4 cm in the right common iliac chain just anterior to the L5 vertebral body.  - Findings suspicious for recurrent prostate cancer involving the pelvic lymph nodes.    ASSESSMENT AND PLAN: Mr. Stallworth is a 66 year old man with locally recurrent prostate cancer, here for a follow-up.     1. Locally-recurrent prostate cancer:   - Pt completed salvage RT and now on ADT for locally-recurrent prostate ca.   - Off ADT  - Feels well  - T rising and PSA remains low.   -I have requested a PSA and testosterone for December.  He can follow-up with an LINDA at that time.  If the PSA is rising and is over 0.2 I would order a PSMA  PET scan and, based on those results, decide whether further SBRT or other could be appropriate.    2. Muscle loss:   - Had joined a gym through health plan but unable to access.   - Discussed resistance exercises. Continue doing [id number] steps a day.     3. Gynecomastia:  - Discussed medical versus RT options for treatment. Pt wants to hold off.     4. Hematuria - will see Cho. May be RT related but if persists would recommend Cystoscopy    5. DM - HbA1C is slightly elevated. followup per PMD    RTC 6 months phone visit         Ezra Rivera MD

## 2022-07-22 NOTE — LETTER
"    7/22/2022         RE: Zander Stallworth  [address]  [place] [address]        Dear Colleague,    Thank you for referring your patient, Zander Stallworth, to the [facility] CANCER CLINIC. Please see a copy of my visit note below.      MEDICAL ONCOLOGY VIRTUAL VISIT NOTE      REFERRING PROVIDER: Dr. Ronquillo ([facility])  RADIATION ONCOLOGIST: Doug Schreiber MD    REASON FOR CURRENT VISIT: Recurrent prostate cancer    HISTORY OF PRESENT ILLNESS: Mr. Zander Stallworth is a 66 year old gentleman who was originally referred by Dr. Ronquillo of Urology for evaluation of rising PSA and concern for metastatic prostate cancer. He was diagnosed with prostate cancer initially in 2017 after an abnormal FIDE. TRUS with biopsy revealed [place] 5+3=8 prostate adenocarcinoma in 9 of 12 cores (invovling 60% of the tissue), with perineural invasion. PSA was 2.7 at that time. CT and bone scan showed no evidence of metastatic disease. He underwent radical prostatectomy on 8/7/17, pathology consistent with [place] 3+4, [id number]. PSA was undetectable after surgery, until February 2019 when PSA was found to be 0.1, up to 0.2 on 8/29/19 and stable at 0.2 on 9/30/19. PET/CT on 9/20/19 revealed findings suspicious for metastatic prostate cancer involving hypermetabolic lymph nodes measuring 0.8 x 0.6 cm in the right internal iliac chain along the right pelvic sidewall and 0.5 x 0.4 cm in the right common iliac chain just anterior to the L5 vertebral body. He was seen by Dr. Ronquillo in urology on 10/3/19 and discussed treatment options including radiation, hormone therapy and pelvic lymph node dissection.    He then underwent a bilateral robot-assisted laparoscopic pelvic lymph node dissection with Dr. Ronquillo on 11/19/19 and pathology showed - \"A. Lymph nodes, right pelvic, excision: - Seven benign lymph nodes (0/7)  B. Lymph nodes, right iliac, excision: - Six benign lymph nodes (0/6)  C. Lymph nodes, right common iliac, excision: - " "Two of five lymph nodes positive for metastatic prostatic adenocarcinoma (2/5); please see comment - Largest metastatic deposit: 1.1 cm - Extranodal extension is present - D. Lymph nodes, right deep obturator, excision: - Benign soft tissue - No lymph nodes identified E. Lymph nodes, left pelvic, excision: - Three benign lymph nodes (0/3).  COMMENT: Immunohistochemical stains show the tumor is positive for synaptophysin. Chromogranin and CD56 show scattered reactivity. While not confirmatory, these results suggest high grade neuroendocrine differentiation.\" Case was reviewed in  tumor board and recommendation made to give ADT+pelvic RT.    He started indefinite ADT with Lupron on 12/17/2019 and received salvage radiotherapy with Dr. Schreiber around March 2020.    --taken off January 2022 ( last shot October of 2021)   He is doing well overall.  He is exercising in the form of walking but not doing any resistance exercise.  He still has some hot flashes and mild fatigue but otherwise feels he is doing quite well.  He has no urinary symptoms.  Reports no blood.  He is seen today via video visit.    REVIEW OF SYSTEMS:  A full 14 point ROS was negative other than the symptoms noted above in the HPI.    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     Hypercholesterolemia 11/27/2009     Lesion of plantar nerve 04/02/2015     Malabsorption of glucose 11/18/2009     Malignant neoplasm of prostate (H) 06/13/2017     Metatarsalgia 04/02/2015     Presbyopia 11/09/2010     Sensorineural hearing loss, bilateral 06/14/2012     Prostate cancer (H) 11/07/2019     Diabetes mellitus, type 2 (H) 01/03/2022     Resolved Ambulatory Problems     Diagnosis Date Noted     No Resolved Ambulatory Problems     Past Medical History:   Diagnosis Date     Allergic " rhinitis      DM (diabetes mellitus) (H)      Hearing loss      HLD (hyperlipidemia)      Mumps      PAST SURGICAL HISTORY:  Past Surgical History:   Procedure Laterality Date     CYSTOSCOPY       DAVINCI LYMPHADENECTOMY N/A 11/19/2019    Procedure: davinci assisted bilateral pelvic lymph node dissection;  Surgeon: Ousmane Ronquillo MD;  Location: [facility] OR     parotid gland removed  2015    parotidectomy      PROSTATE SURGERY  08/07/2017    radical prostatectomy with bilateral pelvic lymph node dissection      SOCIAL HISTORY:   Social History     Tobacco Use     Smoking status: Never Smoker     Smokeless tobacco: Never Used   Substance Use Topics     Alcohol use: Yes     Comment: 10 drinks/week     Drug use: Never     FAMILY HISTORY:  - Father had dementia in his late 60s, as did his paternal grandfather.  - Father had colon cancer and later lung cancer.    ALLERGIES:  Allergies   Allergen Reactions     Amoxicillin-Pot Clavulanate      Itching      Erythromycin      Sildenafil      Visual changes with sildenafil.      CURRENT MEDICATIONS:  Current Outpatient Medications:      diclofenac (VOLTAREN) 1 % topical gel, Apply topically as needed, Disp: , Rfl:      fexofenadine (ALLEGRA) 60 MG tablet, Take 60 mg by mouth every morning , Disp: , Rfl:      fluticasone (FLONASE) 50 MCG/ACT nasal spray, Spray 2 sprays into both nostrils daily , Disp: , Rfl:      hydrocortisone (ANUCORT-HC) 25 MG suppository, Place 25 mg rectally 2 times daily PRN, Disp: , Rfl:      metFORMIN (GLUCOPHAGE) 500 MG tablet, Take 500 mg by mouth daily (with breakfast) , Disp: , Rfl:      Multiple Vitamins-Minerals (CENTRUM SILVER PO), Take by mouth every morning , Disp: , Rfl:      simvastatin (ZOCOR) 10 MG tablet, At Bedtime , Disp: , Rfl:     PHYSICAL EXAMINATION:  Virtual visit.    LABORATORY DATA:    Latest Reference Range & Units 07/07/22 10:41   Sodium 133 - 144 mmol/L 141   Potassium 3.4 - 5.3 mmol/L 4.1   Chloride 94 - 109 mmol/L 106    Carbon Dioxide 20 - 32 mmol/L 24   Urea Nitrogen 7 - 30 mg/dL 15   Creatinine 0.66 - 1.25 mg/dL 0.72   GFR Estimate >60 mL/min/1.73m2 >90   Calcium 8.5 - 10.1 mg/dL 8.8   Anion Gap 3 - 14 mmol/L 11   Albumin 3.4 - 5.0 g/dL 4.0   Protein Total 6.8 - 8.8 g/dL 7.2   Alkaline Phosphatase 40 - 150 U/L 75   ALT 0 - 70 U/L 33   AST 0 - 45 U/L 20   Bilirubin Total 0.2 - 1.3 mg/dL 0.6   Hemoglobin A1C 0.0 - 5.6 % 6.7 (H)   PSA 0.00 - 4.00 ug/L <0.01   Testosterone Total 240 - 950 ng/dL 210 (L)   Glucose 70 - 99 mg/dL 147 (H)   WBC 4.0 - 11.0 10e3/uL 6.4   Hemoglobin 13.3 - 17.7 g/dL 12.6 (L)   Hematocrit 40.0 - 53.0 % 37.6 (L)   Platelet Count 150 - 450 10e3/uL 169   (H): Data is abnormally high  (L): Data is abnormally low      IMAGING STUDIES:  Reviewed Axumin PET Scan from 9/20/19:  - Notable only for two fluciclovine-avid lymph nodes measuring 0.8 x 0.6 cm in the right internal iliac chain along the right pelvic side wall and 0.5 x 0.4 cm in the right common iliac chain just anterior to the L5 vertebral body.  - Findings suspicious for recurrent prostate cancer involving the pelvic lymph nodes.    ASSESSMENT AND PLAN: Mr. Stallworth is a 66 year old man with locally recurrent prostate cancer, here for a follow-up.     1. Locally-recurrent prostate cancer:   - Pt completed salvage RT and now on ADT for locally-recurrent prostate ca.   - Off ADT  - Feels well  - T rising and PSA remains low.   -I have requested a PSA and testosterone for December.  He can follow-up with an LINDA at that time.  If the PSA is rising and is over 0.2 I would order a PSMA PET scan and, based on those results, decide whether further SBRT or other could be appropriate.    2. Muscle loss:   - Had joined a gym through health plan but unable to access.   - Discussed resistance exercises. Continue doing [id number] steps a day.     3. Gynecomastia:  - Discussed medical versus RT options for treatment. Pt wants to hold off.     4. Hematuria - will see Cho.  May be RT related but if persists would recommend Cystoscopy    5. DM - HbA1C is slightly elevated. followup per PMD    RTC 6 months phone visit         Ezra Rivera MD

## 2022-10-15 ENCOUNTER — HEALTH MAINTENANCE LETTER (OUTPATIENT)
Age: 67
End: 2022-10-15

## 2022-12-14 ENCOUNTER — TRANSCRIBE ORDERS (OUTPATIENT)
Dept: LAB | Facility: CLINIC | Age: 67
End: 2022-12-14

## 2022-12-14 DIAGNOSIS — Z13.21 SCREENING FOR ENDOCRINE, NUTRITIONAL, METABOLIC AND IMMUNITY DISORDER: ICD-10-CM

## 2022-12-14 DIAGNOSIS — E78.00 HYPERCHOLESTEROLEMIA: Primary | ICD-10-CM

## 2022-12-14 DIAGNOSIS — Z13.29 SCREENING FOR ENDOCRINE, NUTRITIONAL, METABOLIC AND IMMUNITY DISORDER: ICD-10-CM

## 2022-12-14 DIAGNOSIS — E11.9 DIABETES MELLITUS WITHOUT COMPLICATION (H): Primary | ICD-10-CM

## 2022-12-14 DIAGNOSIS — Z13.228 SCREENING FOR ENDOCRINE, NUTRITIONAL, METABOLIC AND IMMUNITY DISORDER: ICD-10-CM

## 2022-12-14 DIAGNOSIS — Z13.0 SCREENING FOR ENDOCRINE, NUTRITIONAL, METABOLIC AND IMMUNITY DISORDER: ICD-10-CM

## 2022-12-16 ENCOUNTER — LAB (OUTPATIENT)
Dept: LAB | Facility: CLINIC | Age: 67
End: 2022-12-16
Payer: MEDICARE

## 2022-12-16 DIAGNOSIS — Z13.29 SCREENING FOR ENDOCRINE, NUTRITIONAL, METABOLIC AND IMMUNITY DISORDER: ICD-10-CM

## 2022-12-16 DIAGNOSIS — E78.00 HYPERCHOLESTEROLEMIA: ICD-10-CM

## 2022-12-16 DIAGNOSIS — C61 PROSTATE CANCER (H): ICD-10-CM

## 2022-12-16 DIAGNOSIS — Z13.228 SCREENING FOR ENDOCRINE, NUTRITIONAL, METABOLIC AND IMMUNITY DISORDER: ICD-10-CM

## 2022-12-16 DIAGNOSIS — Z13.0 SCREENING FOR ENDOCRINE, NUTRITIONAL, METABOLIC AND IMMUNITY DISORDER: ICD-10-CM

## 2022-12-16 DIAGNOSIS — E11.9 DIABETES MELLITUS WITHOUT COMPLICATION (H): ICD-10-CM

## 2022-12-16 DIAGNOSIS — Z13.21 SCREENING FOR ENDOCRINE, NUTRITIONAL, METABOLIC AND IMMUNITY DISORDER: ICD-10-CM

## 2022-12-16 LAB
ANION GAP SERPL CALCULATED.3IONS-SCNC: 11 MMOL/L (ref 7–15)
BUN SERPL-MCNC: 13.1 MG/DL (ref 8–23)
CALCIUM SERPL-MCNC: 9.2 MG/DL (ref 8.8–10.2)
CHLORIDE SERPL-SCNC: 102 MMOL/L (ref 98–107)
CHOLEST SERPL-MCNC: 158 MG/DL
CREAT SERPL-MCNC: 0.8 MG/DL (ref 0.67–1.17)
DEPRECATED HCO3 PLAS-SCNC: 24 MMOL/L (ref 22–29)
GFR SERPL CREATININE-BSD FRML MDRD: >90 ML/MIN/1.73M2
GLUCOSE SERPL-MCNC: 271 MG/DL (ref 70–99)
HDLC SERPL-MCNC: 49 MG/DL
LDLC SERPL CALC-MCNC: 82 MG/DL
NONHDLC SERPL-MCNC: 109 MG/DL
POTASSIUM SERPL-SCNC: 4.5 MMOL/L (ref 3.4–5.3)
PSA SERPL-MCNC: <0.01 NG/ML (ref 0–4.5)
SODIUM SERPL-SCNC: 137 MMOL/L (ref 136–145)
TRIGL SERPL-MCNC: 134 MG/DL
TSH SERPL DL<=0.005 MIU/L-ACNC: 3.63 UIU/ML (ref 0.3–4.2)

## 2022-12-16 PROCEDURE — 80061 LIPID PANEL: CPT | Performed by: PATHOLOGY

## 2022-12-16 PROCEDURE — 84403 ASSAY OF TOTAL TESTOSTERONE: CPT | Performed by: INTERNAL MEDICINE

## 2022-12-16 PROCEDURE — 80048 BASIC METABOLIC PNL TOTAL CA: CPT | Performed by: PATHOLOGY

## 2022-12-16 PROCEDURE — 83036 HEMOGLOBIN GLYCOSYLATED A1C: CPT | Performed by: FAMILY MEDICINE

## 2022-12-16 PROCEDURE — 84153 ASSAY OF PSA TOTAL: CPT | Performed by: PATHOLOGY

## 2022-12-16 PROCEDURE — 84443 ASSAY THYROID STIM HORMONE: CPT | Performed by: PATHOLOGY

## 2022-12-16 PROCEDURE — 36415 COLL VENOUS BLD VENIPUNCTURE: CPT | Performed by: PATHOLOGY

## 2022-12-17 LAB — HBA1C MFR BLD: 10.6 %

## 2022-12-20 LAB — TESTOST SERPL-MCNC: 324 NG/DL (ref 240–950)

## 2022-12-21 NOTE — PROGRESS NOTES
Zander is a 67 year old who is being evaluated via a billable video visit.      How would you like to obtain your AVS? [employer]  If the video visit is dropped, the invitation should be resent by: Text to cell phone: [phone]  Will anyone else be joining your video visit? Barbara Serranotalita Granda SIMONA    Video-Visit Details    Type of service:  Video Visit   Video Start Time: 9:09 AM  Video End Time:9:20 AM    Originating Location (pt. Location): Home     Distant Location (provider location):  Off-site  Platform used for Video Visit: [facility] ONCOLOGY VIRTUAL VISIT NOTE  December 23, 2022    REFERRING PROVIDER: Dr. Ronquillo ([facility])  RADIATION ONCOLOGIST: Doug Schreiber MD    REASON FOR CURRENT VISIT: Recurrent prostate cancer    HISTORY OF PRESENT ILLNESS: Mr. Zander Stallworth is a 67 year old gentleman who was originally referred by Dr. Ronquillo of Urology for evaluation of rising PSA and concern for metastatic prostate cancer. He was diagnosed with prostate cancer initially in 2017 after an abnormal FIDE. TRUS with biopsy revealed Hallie 5+3=8 prostate adenocarcinoma in 9 of 12 cores (invovling 60% of the tissue), with perineural invasion. PSA was 2.7 at that time. CT and bone scan showed no evidence of metastatic disease. He underwent radical prostatectomy on 8/7/17, pathology consistent with Hallie 3+4, [id number]. PSA was undetectable after surgery, until February 2019 when PSA was found to be 0.1, up to 0.2 on 8/29/19 and stable at 0.2 on 9/30/19. PET/CT on 9/20/19 revealed findings suspicious for metastatic prostate cancer involving hypermetabolic lymph nodes measuring 0.8 x 0.6 cm in the right internal iliac chain along the right pelvic sidewall and 0.5 x 0.4 cm in the right common iliac chain just anterior to the L5 vertebral body. He was seen by Dr. Ronquillo in urology on 10/3/19 and discussed treatment options including radiation, hormone therapy and pelvic lymph node dissection.    He then underwent a  "bilateral robot-assisted laparoscopic pelvic lymph node dissection with Dr. Ronquillo on 11/19/19 and pathology showed - \"A. Lymph nodes, right pelvic, excision: - Seven benign lymph nodes (0/7)  B. Lymph nodes, right iliac, excision: - Six benign lymph nodes (0/6)  C. Lymph nodes, right common iliac, excision: - Two of five lymph nodes positive for metastatic prostatic adenocarcinoma (2/5); please see comment - Largest metastatic deposit: 1.1 cm - Extranodal extension is present - D. Lymph nodes, right deep obturator, excision: - Benign soft tissue - No lymph nodes identified E. Lymph nodes, left pelvic, excision: - Three benign lymph nodes (0/3).  COMMENT: Immunohistochemical stains show the tumor is positive for synaptophysin. Chromogranin and CD56 show scattered reactivity. While not confirmatory, these results suggest high grade neuroendocrine differentiation.\" Case was reviewed in  tumor board and recommendation made to give ADT+pelvic RT.    He started indefinite ADT with Lupron on 12/17/2019 and received salvage radiotherapy with Dr. Schreiber around March 2020.    --taken off January 2022 ( last shot October of 2021)     Interval History:  He is doing well overall.   Energy OK  Rare hot flash at night  Sensitive nipples with fullness  A1C up, working with PCP on this  Wants to be more active    REVIEW OF SYSTEMS:  A full 14 point ROS was negative other than the symptoms noted above in the HPI.    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     Hypercholesterolemia 11/27/2009     Lesion of plantar nerve 04/02/2015     Malabsorption of glucose 11/18/2009     Malignant neoplasm of prostate (H) 06/13/2017     Metatarsalgia 04/02/2015     Presbyopia 11/09/2010     Sensorineural hearing loss, bilateral 06/14/2012     Prostate cancer (H) " 11/07/2019     Diabetes mellitus, type 2 (H) 01/03/2022     Resolved Ambulatory Problems     Diagnosis Date Noted     No Resolved Ambulatory Problems     Past Medical History:   Diagnosis Date     Allergic rhinitis      DM (diabetes mellitus) (H)      Hearing loss      HLD (hyperlipidemia)      Mumps      PAST SURGICAL HISTORY:  Past Surgical History:   Procedure Laterality Date     CYSTOSCOPY       DAVINCI LYMPHADENECTOMY N/A 11/19/2019    Procedure: davinci assisted bilateral pelvic lymph node dissection;  Surgeon: Ousmane Ronquillo MD;  Location: [facility] OR     parotid gland removed  2015    parotidectomy      PROSTATE SURGERY  08/07/2017    radical prostatectomy with bilateral pelvic lymph node dissection      SOCIAL HISTORY:   Social History     Tobacco Use     Smoking status: Never     Smokeless tobacco: Never   Substance Use Topics     Alcohol use: Yes     Comment: 10 drinks/week     Drug use: Never     FAMILY HISTORY:  - Father had dementia in his late 60s, as did his paternal grandfather.  - Father had colon cancer and later lung cancer.    ALLERGIES:  Allergies   Allergen Reactions     Amoxicillin-Pot Clavulanate      Itching      Erythromycin      Sildenafil      Visual changes with sildenafil.      CURRENT MEDICATIONS:  Current Outpatient Medications:      diclofenac (VOLTAREN) 1 % topical gel, Apply topically as needed, Disp: , Rfl:      fexofenadine (ALLEGRA) 60 MG tablet, Take 60 mg by mouth every morning , Disp: , Rfl:      fluticasone (FLONASE) 50 MCG/ACT nasal spray, Spray 2 sprays into both nostrils daily , Disp: , Rfl:      hydrocortisone (ANUCORT-HC) 25 MG suppository, Place 25 mg rectally 2 times daily PRN, Disp: , Rfl:      metFORMIN (GLUCOPHAGE) 500 MG tablet, Take 500 mg by mouth daily (with breakfast) , Disp: , Rfl:      Multiple Vitamins-Minerals (CENTRUM SILVER PO), Take by mouth every morning , Disp: , Rfl:      simvastatin (ZOCOR) 10 MG tablet, At Bedtime , Disp: , Rfl:     PHYSICAL  EXAMINATION:  Virtual visit.    LABORATORY DATA:   PSA and T reviewed by me for todays visit      IMAGING STUDIES:  Reviewed Axumin PET Scan from 9/20/19:  - Notable only for two fluciclovine-avid lymph nodes measuring 0.8 x 0.6 cm in the right internal iliac chain along the right pelvic side wall and 0.5 x 0.4 cm in the right common iliac chain just anterior to the L5 vertebral body.  - Findings suspicious for recurrent prostate cancer involving the pelvic lymph nodes.    ASSESSMENT AND PLAN: Mr. Stallworth is a 67 year old man with locally recurrent prostate cancer, here for a follow-up.     1. Locally-recurrent prostate cancer:   - Pt completed salvage RT and now on ADT for locally-recurrent prostate ca.   - Off ADT, Feels well  - T normal and PSA remains low.   -Will recheck PSA and testosterone in another 6 mo. If the PSA is rising and is over 0.2,  I would order a PSMA PET scan and, based on those results, decide whether further SBRT or other could be appropriate. Reviewed clinical concerns to bring to our attention sooner--new focal bone pain, unintentional weight loss, etc.     2. Muscle loss:   - continue activity     3. Gynecomastia:  - Discussed he has stopped offending agent. Previously discussed medical versus RT options for treatment. Pt wants to hold off.     4. Hematuria - had clear Cystoscopy with Dr. Owens    5. DM - HbA1C is slightly elevated. followup per PMD    6. ED - interested in options now that T recovered. Will follow up with urology     RTC 6 months with labs and visit with Dr. Rivera as above.     30 minutes spent on the date of the encounter doing chart review, review of test results, interpretation of tests, patient visit and documentation     Yolanda Nunes, CNP on 12/23/2022 at 9:35 AM

## 2022-12-23 ENCOUNTER — VIRTUAL VISIT (OUTPATIENT)
Dept: ONCOLOGY | Facility: CLINIC | Age: 67
End: 2022-12-23
Attending: INTERNAL MEDICINE
Payer: MEDICARE

## 2022-12-23 DIAGNOSIS — C61 MALIGNANT NEOPLASM OF PROSTATE (H): Primary | ICD-10-CM

## 2022-12-23 PROCEDURE — 99214 OFFICE O/P EST MOD 30 MIN: CPT | Mod: 95 | Performed by: NURSE PRACTITIONER

## 2022-12-23 NOTE — NURSING NOTE
Patient declined individual allergy and medication review by support staff because patient denies any changes since echeck-in completion and states all information entered during echeck-in remains accurate.    Brandie Granda VF

## 2023-01-17 ENCOUNTER — VIRTUAL VISIT (OUTPATIENT)
Dept: RADIATION ONCOLOGY | Facility: CLINIC | Age: 68
End: 2023-01-17
Attending: RADIOLOGY
Payer: MEDICARE

## 2023-01-17 DIAGNOSIS — C61 PROSTATE CANCER (H): Primary | ICD-10-CM

## 2023-01-17 PROCEDURE — 99213 OFFICE O/P EST LOW 20 MIN: CPT | Mod: 95 | Performed by: NURSE PRACTITIONER

## 2023-01-17 NOTE — PROGRESS NOTES
Zander is a 66 year old who is being evaluated via a billable video visit.      How would you like to obtain your AVS? MyChart  If the video visit is dropped, the invitation should be resent by: Send to e-mail at: [email or web address]  Will anyone else be joining your video visit? No      Video-Visit Details      Type of service:  Video Visit    Joined the call at 2023, 10:33:17 am.  Left the call at 2023, 10:47:54 am.    Originating Location (pt. Location): Home    Distant Location (provider location):  [facility] RADIATION ONCOLOGY     Platform used for Video Visit: [facility] [place]  Radiation Oncology Follow-up Note       Zander Stallworth   MRN: [id number]  : 1955      DISEASE TREATED: Recurrent high risk prostate cancer, high-grade neuroendocrine differentiation. [id number], Hallie 3+4=7, PSA=0.2.  Axumin PET/CT scan for LN+     INTERVAL SINCE COMPLETION OF RADIATION THERAPY: almost 3 years (completed on 3/4/2020)     TYPE OF RADIATION THERAPY ADMINISTERED: 7000 cGy in 35 fractions (4600cGy to LN + Boost)       HORMONAL THERAPY: Plan Indefinite. Lupron 22.5 mg given 2019, 45 mg given 3/18/2020, 2020, 2021, 10/1/2021)     SUBJECTIVE: Mr. Stallworth is a 66 year old man with biochemically and nodally recurrent prostate cancer. His pre-op PSA was 2.7, with highest Plymouth's score of 5+3=8. He underwent upfront treatment with RALP and BLND in 2017. There was no evidence of EPE SVI, federica involvement (0/3); and negative margins were achieved.      His post-op PSA was initially undetectable. It mariano to a value of 0.1 on 2/15/2019, and increased to 0.2 on 2019. Axumin PET/CT on 2019 then showed FDG avidity in one right common iliac node and an right internal iliac node. He underwent repeat PLND on 2019, with pathology revealing high grade neuroendocrine tumor in 2/5 right common iliac nodes  dissected. This was deemed to be recurrence of his prostate cancer, and he proceeded to undergo salvage radiotherapy in conjunction with ADT. His last Lupron shot was on 10/1/2021. He was seen in follow-up with his medical oncologist, Dr. Rivera, 1/3/2022 at which time they discussed stopping ADT until the testosterone starts to rise again.    He was last seen for follow up 6 months ago, at which time he reported mild intermittent hematuria. He established care with Dr. Owens and underwent cystoscopy on 3/10/2022, with findings consistent with radiation cystitis.     On interview today, the patient states that he is well. His AUA Score is 4/35 while it was previously 4/35.  He does wear a pad due to leakage that he only needs to change once a day.  He has diminished hot flash and night sweat. His libido is recovering. He cannot achieve an erection related to surgery, and he tried to pump and was not satisfied with that.  Is considering seeing urology for injections.  He has only had a couple of episodes of hematuria.  He occasionally sees a small amount of blood per rectum.    He and his wife are headed to [place] and [place] until March.     OBJECTIVE:   Vitals:  No vitals were obtained today due to virtual visit.    Physical Exam   GENERAL: Healthy, alert and no distress    PSYCH: Mentation appears normal, affect normal/bright, judgement and insight intact, normal speech and appearance well-groomed.    PSA     12/16/2022 <0.01          Testosterone 324   07/07/2022 <0.01   12/23/2021 <0.01   10/01/2021 Lupron 45mg   05/03/2021 <0.01   04/02/2021 Lupron 45 mg   11/13/2020 <0.01   09/16/2020 Lupron 45 mg   05/08/2020 <0.01   03/18/2020 Lupron 45 mg   03/04/2020 Salvage Radiotherapy   12/17/2019 Lupron 22.5 mg started   12/17/2020 (pre-RT) 0.34   09/30/2019 0.2   08/29/2019 0.2   05/20/2019 0.1   02/14/2019 0.1   08/14/2018 <0.1   02/12/2018 <0.1   11/14/2017 (post-op) <0.1   08/07/2017 RARP   05/12/2017 2.7    06/10/2015 1.99   09/26/2014 1.64      IMPRESSION: PSA remains undetectable. No clinical evidence for recurrent disease at this time.      RECOMMENDATIONS:    Repeat PSA in 6 months  Follow up in 6 months with NP  Continue following with Dr. Rivera as scheduled      Aicha Stapleton NP  Department of Radiation Oncology  [facility]

## 2023-01-17 NOTE — LETTER
2023         RE: Zander Stallworth  [address]  [place] [address]        Dear Colleague,    Thank you for referring your patient, Zander Stallworth, to the [facility] RADIATION ONCOLOGY. Please see a copy of my visit note below.        [facility], [place]  Radiation Oncology Follow-up Note       Zander Stallworth   MRN: [id number]  : 1955      DISEASE TREATED: Recurrent high risk prostate cancer, high-grade neuroendocrine differentiation. [id number], Hallie 3+4=7, PSA=0.2.  Axumin PET/CT scan for LN+     INTERVAL SINCE COMPLETION OF RADIATION THERAPY: almost 3 years (completed on 3/4/2020)     TYPE OF RADIATION THERAPY ADMINISTERED: 7000 cGy in 35 fractions (4600cGy to LN + Boost)       HORMONAL THERAPY: Plan Indefinite. Lupron 22.5 mg given 2019, 45 mg given 3/18/2020, 2020, 2021, 10/1/2021)     SUBJECTIVE: Mr. Stallworth is a 66 year old man with biochemically and nodally recurrent prostate cancer. His pre-op PSA was 2.7, with highest [place]'s score of 5+3=8. He underwent upfront treatment with RALP and BLND in 2017. There was no evidence of EPE SVI, federica involvement (0/3); and negative margins were achieved.      His post-op PSA was initially undetectable. It mariano to a value of 0.1 on 2/15/2019, and increased to 0.2 on 2019. Axumin PET/CT on 2019 then showed FDG avidity in one right common iliac node and an right internal iliac node. He underwent repeat PLND on 2019, with pathology revealing high grade neuroendocrine tumor in 2/5 right common iliac nodes dissected. This was deemed to be recurrence of his prostate cancer, and he proceeded to undergo salvage radiotherapy in conjunction with ADT. His last Lupron shot was on 10/1/2021. He was seen in follow-up with his medical oncologist, Dr. Rivera, 1/3/2022 at which time they discussed stopping ADT until the testosterone starts to rise again.    He was last seen for  follow up 6 months ago, at which time he reported mild intermittent hematuria. He established care with Dr. Owens and underwent cystoscopy on 3/10/2022, with findings consistent with radiation cystitis.     On interview today, the patient states that he is well. His AUA Score is 4/35 while it was previously 4/35.  He does wear a pad due to leakage that he only needs to change once a day.  He has diminished hot flash and night sweat. His libido is recovering. He cannot achieve an erection related to surgery, and he tried to pump and was not satisfied with that.  Is considering seeing urology for injections.  He has only had a couple of episodes of hematuria.  He occasionally sees a small amount of blood per rectum.    He and his wife are headed to [place] and [place] until March.     OBJECTIVE:   Vitals:  No vitals were obtained today due to virtual visit.    Physical Exam   GENERAL: Healthy, alert and no distress    PSYCH: Mentation appears normal, affect normal/bright, judgement and insight intact, normal speech and appearance well-groomed.    PSA     12/16/2022 <0.01          Testosterone 324   07/07/2022 <0.01   12/23/2021 <0.01   10/01/2021 Lupron 45mg   05/03/2021 <0.01   04/02/2021 Lupron 45 mg   11/13/2020 <0.01   09/16/2020 Lupron 45 mg   05/08/2020 <0.01   03/18/2020 Lupron 45 mg   03/04/2020 Salvage Radiotherapy   12/17/2019 Lupron 22.5 mg started   12/17/2020 (pre-RT) 0.34   09/30/2019 0.2   08/29/2019 0.2   05/20/2019 0.1   02/14/2019 0.1   08/14/2018 <0.1   02/12/2018 <0.1   11/14/2017 (post-op) <0.1   08/07/2017 RARP   05/12/2017 2.7   06/10/2015 1.99   09/26/2014 1.64      IMPRESSION: PSA remains undetectable. No clinical evidence for recurrent disease at this time.      RECOMMENDATIONS:    Repeat PSA in 6 months  Follow up in 6 months with NP  Continue following with Dr. Rivera as scheduled      Aicha Stapleton NP  Department of Radiation Oncology  [facility]

## 2023-03-26 ENCOUNTER — HEALTH MAINTENANCE LETTER (OUTPATIENT)
Age: 68
End: 2023-03-26

## 2023-06-06 DIAGNOSIS — E11.9 TYPE 2 DIABETES MELLITUS WITHOUT COMPLICATION, WITHOUT LONG-TERM CURRENT USE OF INSULIN (H): Primary | ICD-10-CM

## 2023-06-07 ENCOUNTER — LAB (OUTPATIENT)
Dept: LAB | Facility: CLINIC | Age: 68
End: 2023-06-07
Payer: MEDICARE

## 2023-06-07 DIAGNOSIS — E11.9 TYPE 2 DIABETES MELLITUS WITHOUT COMPLICATION, WITHOUT LONG-TERM CURRENT USE OF INSULIN (H): ICD-10-CM

## 2023-06-07 DIAGNOSIS — C61 MALIGNANT NEOPLASM OF PROSTATE (H): ICD-10-CM

## 2023-06-07 LAB
CREAT UR-MCNC: 56.5 MG/DL
HBA1C MFR BLD: 7.6 %
MICROALBUMIN UR-MCNC: <12 MG/L
MICROALBUMIN/CREAT UR: NORMAL MG/G{CREAT}
PSA SERPL DL<=0.01 NG/ML-MCNC: <0.01 NG/ML (ref 0–4.5)

## 2023-06-07 PROCEDURE — 82570 ASSAY OF URINE CREATININE: CPT | Performed by: FAMILY MEDICINE

## 2023-06-07 PROCEDURE — 36415 COLL VENOUS BLD VENIPUNCTURE: CPT | Performed by: PATHOLOGY

## 2023-06-07 PROCEDURE — 84403 ASSAY OF TOTAL TESTOSTERONE: CPT | Performed by: NURSE PRACTITIONER

## 2023-06-07 PROCEDURE — 83036 HEMOGLOBIN GLYCOSYLATED A1C: CPT | Performed by: FAMILY MEDICINE

## 2023-06-07 PROCEDURE — 84153 ASSAY OF PSA TOTAL: CPT | Performed by: PATHOLOGY

## 2023-06-08 LAB — TESTOST SERPL-MCNC: 373 NG/DL (ref 240–950)

## 2023-06-11 ENCOUNTER — HEALTH MAINTENANCE LETTER (OUTPATIENT)
Age: 68
End: 2023-06-11

## 2023-06-14 ENCOUNTER — VIRTUAL VISIT (OUTPATIENT)
Dept: ONCOLOGY | Facility: CLINIC | Age: 68
End: 2023-06-14
Attending: NURSE PRACTITIONER
Payer: MEDICARE

## 2023-06-14 DIAGNOSIS — N52.35 ERECTILE DYSFUNCTION FOLLOWING RADIATION THERAPY: Primary | ICD-10-CM

## 2023-06-14 PROCEDURE — 99214 OFFICE O/P EST MOD 30 MIN: CPT | Mod: 95 | Performed by: INTERNAL MEDICINE

## 2023-06-14 NOTE — NURSING NOTE
Is the patient currently in the state of [place]? YES    Visit mode:VIDEO    If the visit is dropped, the patient can be reconnected by: VIDEO VISIT: Text to cell phone: [phone]    Will anyone else be joining the visit? NO      How would you like to obtain your AVS? MyChart    Are changes needed to the allergy or medication list? NO    Reason for visit: RECHECK (Video visit )  Ilene Vaughan

## 2023-06-14 NOTE — PROGRESS NOTES
"Virtual Visit Details    Type of service:  Video Visit   Video Start Time: 9:51 AM  Video End Time:10:12    Originating Location (pt. Location): Home    Distant Location (provider location):  On-site  Platform used for Video Visit: [facility] ONCOLOGY VIRTUAL VISIT NOTE  June 14, 2023    REFERRING PROVIDER: Dr. Ronquillo ([facility])  RADIATION ONCOLOGIST: Doug Schreiber MD    REASON FOR CURRENT VISIT: Recurrent prostate cancer    HISTORY OF PRESENT ILLNESS: Mr. Zander Stallworth is a 67 year old gentleman who was originally referred by Dr. Ronquillo of Urology for evaluation of rising PSA and concern for metastatic prostate cancer. He was diagnosed with prostate cancer initially in 2017 after an abnormal FIDE. TRUS with biopsy revealed Hallie 5+3=8 prostate adenocarcinoma in 9 of 12 cores (invovling 60% of the tissue), with perineural invasion. PSA was 2.7 at that time. CT and bone scan showed no evidence of metastatic disease. He underwent radical prostatectomy on 8/7/17, pathology consistent with Hallie 3+4, [id number]. PSA was undetectable after surgery, until February 2019 when PSA was found to be 0.1, up to 0.2 on 8/29/19 and stable at 0.2 on 9/30/19. PET/CT on 9/20/19 revealed findings suspicious for metastatic prostate cancer involving hypermetabolic lymph nodes measuring 0.8 x 0.6 cm in the right internal iliac chain along the right pelvic sidewall and 0.5 x 0.4 cm in the right common iliac chain just anterior to the L5 vertebral body. He was seen by Dr. Ronquillo in urology on 10/3/19 and discussed treatment options including radiation, hormone therapy and pelvic lymph node dissection.    He then underwent a bilateral robot-assisted laparoscopic pelvic lymph node dissection with Dr. Ronquillo on 11/19/19 and pathology showed - \"A. Lymph nodes, right pelvic, excision: - Seven benign lymph nodes (0/7)  B. Lymph nodes, right iliac, excision: - Six benign lymph nodes (0/6)  C. Lymph nodes, right common iliac, excision: " "- Two of five lymph nodes positive for metastatic prostatic adenocarcinoma (2/5); please see comment - Largest metastatic deposit: 1.1 cm - Extranodal extension is present - D. Lymph nodes, right deep obturator, excision: - Benign soft tissue - No lymph nodes identified E. Lymph nodes, left pelvic, excision: - Three benign lymph nodes (0/3).  COMMENT: Immunohistochemical stains show the tumor is positive for synaptophysin. Chromogranin and CD56 show scattered reactivity. While not confirmatory, these results suggest high grade neuroendocrine differentiation.\" Case was reviewed in  tumor board and recommendation made to give ADT+pelvic RT.    He started indefinite ADT with Lupron on 12/17/2019 and received salvage radiotherapy with Dr. Schreiber around March 2020.    --taken off January 2022 ( last shot October of 2021)     6/7/23  PSA = <0.01 T=373    Interval History:  He is doing well overall.   Energy OK  Rare hot flash at night  Sensitive nipples with fullness  A1C up, working with PCP on this  Wants to be more active    REVIEW OF SYSTEMS:  A full 14 point ROS was negative other than the symptoms noted above in the HPI.    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     Hypercholesterolemia 11/27/2009     Lesion of plantar nerve 04/02/2015     Malabsorption of glucose 11/18/2009     Malignant neoplasm of prostate (H) 06/13/2017     Metatarsalgia 04/02/2015     Presbyopia 11/09/2010     Sensorineural hearing loss, bilateral 06/14/2012     Prostate cancer (H) 11/07/2019     Diabetes mellitus, type 2 (H) 01/03/2022     Resolved Ambulatory Problems     Diagnosis Date Noted     No Resolved Ambulatory Problems     Past Medical History:   Diagnosis Date     Allergic rhinitis      DM (diabetes mellitus) (H)      Hearing loss      HLD " (hyperlipidemia)      Mumps      PAST SURGICAL HISTORY:  Past Surgical History:   Procedure Laterality Date     CYSTOSCOPY       DAVINCI LYMPHADENECTOMY N/A 11/19/2019    Procedure: davinci assisted bilateral pelvic lymph node dissection;  Surgeon: Ousmane Ronquillo MD;  Location: [facility] OR     parotid gland removed  2015    parotidectomy      PROSTATE SURGERY  08/07/2017    radical prostatectomy with bilateral pelvic lymph node dissection      SOCIAL HISTORY:   Social History     Tobacco Use     Smoking status: Never     Smokeless tobacco: Never   Substance Use Topics     Alcohol use: Yes     Comment: 10 drinks/week     Drug use: Never     FAMILY HISTORY:  - Father had dementia in his late 60s, as did his paternal grandfather.  - Father had colon cancer and later lung cancer.    ALLERGIES:  Allergies   Allergen Reactions     Amoxicillin-Pot Clavulanate      Itching      Erythromycin      Sildenafil      Visual changes with sildenafil.      CURRENT MEDICATIONS:  Current Outpatient Medications:      diclofenac (VOLTAREN) 1 % topical gel, Apply topically as needed, Disp: , Rfl:      fexofenadine (ALLEGRA) 60 MG tablet, Take 60 mg by mouth every morning , Disp: , Rfl:      fluticasone (FLONASE) 50 MCG/ACT nasal spray, Spray 2 sprays into both nostrils daily , Disp: , Rfl:      hydrocortisone (ANUCORT-HC) 25 MG suppository, Place 25 mg rectally 2 times daily PRN, Disp: , Rfl:      metFORMIN (GLUCOPHAGE) 500 MG tablet, Take 500 mg by mouth daily (with breakfast) , Disp: , Rfl:      simvastatin (ZOCOR) 10 MG tablet, At Bedtime , Disp: , Rfl:      Multiple Vitamins-Minerals (CENTRUM SILVER PO), Take by mouth every morning , Disp: , Rfl:     PHYSICAL EXAMINATION:  Virtual visit.    LABORATORY DATA:   PSA and T reviewed by me for todays visit      IMAGING STUDIES:  Reviewed Axumin PET Scan from 9/20/19:  - Notable only for two fluciclovine-avid lymph nodes measuring 0.8 x 0.6 cm in the right internal iliac chain along  the right pelvic side wall and 0.5 x 0.4 cm in the right common iliac chain just anterior to the L5 vertebral body.  - Findings suspicious for recurrent prostate cancer involving the pelvic lymph nodes.    ASSESSMENT AND PLAN: Mr. Stallworth is a 67 year old man with locally recurrent prostate cancer, here for a follow-up.     1. Locally-recurrent prostate cancer:   - Pt completed salvage RT and now on ADT for locally-recurrent prostate ca.   - Off ADT, Feels well  - T normal and PSA remains low.   -Will recheck PSA and testosterone in another 6 mo. If the PSA is rising and is over 0.2,  I would order a PSMA PET scan and, based on those results, decide whether further SBRT or other could be appropriate. Reviewed clinical concerns to bring to our attention sooner--new focal bone pain, unintentional weight loss, etc.     2. Muscle loss:   - continue activity     3. Gynecomastia:  - Discussed he has stopped offending agent. Previously discussed medical versus RT options for treatment. Pt wants to hold off.     4. Hematuria - had clear Cystoscopy with Dr. Owens    5. DM - HbA1C is slightly elevated. followup per PMD    6. ED - interested in options now that T recovered. Will follow up with urology     RTC 6 months with labs   Ezra Rivera MD

## 2023-06-14 NOTE — LETTER
6/14/2023         RE: Zander Stallworth  [address]  [facility] [address]        Dear Colleague,    Thank you for referring your patient, Zander Stallworth, to the [facility]. Please see a copy of my visit note below.    Virtual Visit Details    Type of service:  Video Visit   Video Start Time: 9:51 AM  Video End Time:10:12    Originating Location (pt. Location): Home    Distant Location (provider location):  On-site  Platform used for Video Visit: [facility] ONCOLOGY VIRTUAL VISIT NOTE  June 14, 2023    REFERRING PROVIDER: Dr. Ronquillo ([facility])  RADIATION ONCOLOGIST: Doug Schreiber MD    REASON FOR CURRENT VISIT: Recurrent prostate cancer    HISTORY OF PRESENT ILLNESS: Mr. Zander Stallworth is a 67 year old gentleman who was originally referred by Dr. Ronquillo of Urology for evaluation of rising PSA and concern for metastatic prostate cancer. He was diagnosed with prostate cancer initially in 2017 after an abnormal FIDE. TRUS with biopsy revealed [place] 5+3=8 prostate adenocarcinoma in 9 of 12 cores (invovling 60% of the tissue), with perineural invasion. PSA was 2.7 at that time. CT and bone scan showed no evidence of metastatic disease. He underwent radical prostatectomy on 8/7/17, pathology consistent with [place] 3+4, [id number]. PSA was undetectable after surgery, until February 2019 when PSA was found to be 0.1, up to 0.2 on 8/29/19 and stable at 0.2 on 9/30/19. PET/CT on 9/20/19 revealed findings suspicious for metastatic prostate cancer involving hypermetabolic lymph nodes measuring 0.8 x 0.6 cm in the right internal iliac chain along the right pelvic sidewall and 0.5 x 0.4 cm in the right common iliac chain just anterior to the L5 vertebral body. He was seen by Dr. Ronquillo in urology on 10/3/19 and discussed treatment options including radiation, hormone therapy and pelvic lymph node dissection.    He then underwent a bilateral robot-assisted laparoscopic pelvic lymph node  "dissection with Dr. Ronquillo on 11/19/19 and pathology showed - \"A. Lymph nodes, right pelvic, excision: - Seven benign lymph nodes (0/7)  B. Lymph nodes, right iliac, excision: - Six benign lymph nodes (0/6)  C. Lymph nodes, right common iliac, excision: - Two of five lymph nodes positive for metastatic prostatic adenocarcinoma (2/5); please see comment - Largest metastatic deposit: 1.1 cm - Extranodal extension is present - D. Lymph nodes, right deep obturator, excision: - Benign soft tissue - No lymph nodes identified E. Lymph nodes, left pelvic, excision: - Three benign lymph nodes (0/3).  COMMENT: Immunohistochemical stains show the tumor is positive for synaptophysin. Chromogranin and CD56 show scattered reactivity. While not confirmatory, these results suggest high grade neuroendocrine differentiation.\" Case was reviewed in  tumor board and recommendation made to give ADT+pelvic RT.    He started indefinite ADT with Lupron on 12/17/2019 and received salvage radiotherapy with Dr. Schreiber around March 2020.    --taken off January 2022 ( last shot October of 2021)     6/7/23  PSA = <0.01 T=373    Interval History:  He is doing well overall.   Energy OK  Rare hot flash at night  Sensitive nipples with fullness  A1C up, working with PCP on this  Wants to be more active    REVIEW OF SYSTEMS:  A full 14 point ROS was negative other than the symptoms noted above in the HPI.    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    Hypercholesterolemia 11/27/2009    Lesion of plantar nerve 04/02/2015    Malabsorption of glucose 11/18/2009    Malignant neoplasm of prostate (H) 06/13/2017    Metatarsalgia 04/02/2015    Presbyopia 11/09/2010    Sensorineural hearing loss, bilateral 06/14/2012    Prostate cancer (H) 11/07/2019    Diabetes mellitus, type 2 " (H) 01/03/2022     Resolved Ambulatory Problems     Diagnosis Date Noted    No Resolved Ambulatory Problems     Past Medical History:   Diagnosis Date    Allergic rhinitis     DM (diabetes mellitus) (H)     Hearing loss     HLD (hyperlipidemia)     Mumps      PAST SURGICAL HISTORY:  Past Surgical History:   Procedure Laterality Date    CYSTOSCOPY      DAVINCI LYMPHADENECTOMY N/A 11/19/2019    Procedure: davinci assisted bilateral pelvic lymph node dissection;  Surgeon: Ousmane Ronquillo MD;  Location: [facility] OR    parotid gland removed  2015    parotidectomy     PROSTATE SURGERY  08/07/2017    radical prostatectomy with bilateral pelvic lymph node dissection      SOCIAL HISTORY:   Social History     Tobacco Use    Smoking status: Never    Smokeless tobacco: Never   Substance Use Topics    Alcohol use: Yes     Comment: 10 drinks/week    Drug use: Never     FAMILY HISTORY:  - Father had dementia in his late 60s, as did his paternal grandfather.  - Father had colon cancer and later lung cancer.    ALLERGIES:  Allergies   Allergen Reactions    Amoxicillin-Pot Clavulanate      Itching     Erythromycin     Sildenafil      Visual changes with sildenafil.      CURRENT MEDICATIONS:  Current Outpatient Medications:     diclofenac (VOLTAREN) 1 % topical gel, Apply topically as needed, Disp: , Rfl:     fexofenadine (ALLEGRA) 60 MG tablet, Take 60 mg by mouth every morning , Disp: , Rfl:     fluticasone (FLONASE) 50 MCG/ACT nasal spray, Spray 2 sprays into both nostrils daily , Disp: , Rfl:     hydrocortisone (ANUCORT-HC) 25 MG suppository, Place 25 mg rectally 2 times daily PRN, Disp: , Rfl:     metFORMIN (GLUCOPHAGE) 500 MG tablet, Take 500 mg by mouth daily (with breakfast) , Disp: , Rfl:     simvastatin (ZOCOR) 10 MG tablet, At Bedtime , Disp: , Rfl:     Multiple Vitamins-Minerals (CENTRUM SILVER PO), Take by mouth every morning , Disp: , Rfl:     PHYSICAL EXAMINATION:  Virtual visit.    LABORATORY DATA:   PSA and T  reviewed by me for todays visit      IMAGING STUDIES:  Reviewed Axumin PET Scan from 9/20/19:  - Notable only for two fluciclovine-avid lymph nodes measuring 0.8 x 0.6 cm in the right internal iliac chain along the right pelvic side wall and 0.5 x 0.4 cm in the right common iliac chain just anterior to the L5 vertebral body.  - Findings suspicious for recurrent prostate cancer involving the pelvic lymph nodes.    ASSESSMENT AND PLAN: Mr. Stallworth is a 67 year old man with locally recurrent prostate cancer, here for a follow-up.     1. Locally-recurrent prostate cancer:   - Pt completed salvage RT and now on ADT for locally-recurrent prostate ca.   - Off ADT, Feels well  - T normal and PSA remains low.   -Will recheck PSA and testosterone in another 6 mo. If the PSA is rising and is over 0.2,  I would order a PSMA PET scan and, based on those results, decide whether further SBRT or other could be appropriate. Reviewed clinical concerns to bring to our attention sooner--new focal bone pain, unintentional weight loss, etc.     2. Muscle loss:   - continue activity     3. Gynecomastia:  - Discussed he has stopped offending agent. Previously discussed medical versus RT options for treatment. Pt wants to hold off.     4. Hematuria - had clear Cystoscopy with Dr. Owens    5. DM - HbA1C is slightly elevated. followup per PMD    6. ED - interested in options now that T recovered. Will follow up with urology     RTC 6 months with labs   Ezra Rivera MD

## 2023-06-28 NOTE — TELEPHONE ENCOUNTER
MEDICAL RECORDS REQUEST   [facility]  Urology Clinic  [address] [place] [id number]  PHONE: [phone]  Fax: [phone]        FUTURE VISIT INFORMATION                                                       APPOINTMENT INFORMATION:    Date: 08/03/2023    Provider:  Que Ferguson PA-C    Reason for Visit/Diagnosis: ERECTILE DYSFUNCTION    REFERRAL INFORMATION:    Referring provider:  Ezra Rivera MD in  ONCOLOGY ADULT      RECORDS REQUESTED FOR VISIT                                                     NOTES  STATUS/DETAILS   OFFICE NOTE from referring provider  yes, 06/14/2023 -- Ezra Rivera MD in  ONCOLOGY ADULT   MEDICATION LIST  yes   LABS     URINALYSIS (UA)  yes     PRE-VISIT CHECKLIST      Joint diagnostic appointment coordinated correctly          (ensure right order & amount of time) Yes   RECORD COLLECTION COMPLETE Yes

## 2023-07-18 ENCOUNTER — VIRTUAL VISIT (OUTPATIENT)
Dept: RADIATION ONCOLOGY | Facility: CLINIC | Age: 68
End: 2023-07-18
Attending: RADIOLOGY
Payer: MEDICARE

## 2023-07-18 DIAGNOSIS — C61 PROSTATE CANCER (H): Primary | ICD-10-CM

## 2023-07-18 PROCEDURE — 99213 OFFICE O/P EST LOW 20 MIN: CPT | Mod: 95 | Performed by: NURSE PRACTITIONER

## 2023-07-18 NOTE — PROGRESS NOTES
Zander is a 66 year old who is being evaluated via a billable video visit.      How would you like to obtain your AVS? [email or web address]  If the video visit is dropped, the invitation should be resent by: Send to e-mail at: [email or web address]  Will anyone else be joining your video visit? No      Video-Visit Details      Type of service:  Video Visit    Joined the call at 2023, 10:33:12?am.  Left the call at 2023, 10:50:15?am.  You were on the call for 17 minutes 2 seconds .    Originating Location (pt. Location): Home    Distant Location (provider location):  [facility] RADIATION ONCOLOGY     Platform used for Video Visit: [facility], [place]  Radiation Oncology Follow-up Note  2023       Zander Stallworth   MRN: [id number]  : 1955      DISEASE TREATED: Recurrent high risk prostate cancer, high-grade neuroendocrine differentiation. [id number], Hallie 3+4=7, PSA=0.2.  Axumin PET/CT scan for LN+     INTERVAL SINCE COMPLETION OF RADIATION THERAPY: almost 3.5 years (completed on 3/4/2020)     TYPE OF RADIATION THERAPY ADMINISTERED: 7000 cGy in 35 fractions (4600cGy to LN + Boost)       HORMONAL THERAPY: Plan Indefinite. Lupron 22.5 mg given 2019, 45 mg given 3/18/2020, 2020, 2021, 10/1/2021)     SUBJECTIVE: Mr. Stallworth is a 66 year old man with biochemically and nodally recurrent prostate cancer. His pre-op PSA was 2.7, with highest Hallie's score of 5+3=8. He underwent upfront treatment with RALP and BLND in 2017. There was no evidence of EPE SVI, federica involvement (0/3); and negative margins were achieved.      His post-op PSA was initially undetectable. It mariano to a value of 0.1 on 2/15/2019, and increased to 0.2 on 2019. Axumin PET/CT on 2019 then showed FDG avidity in one right common iliac node and an right internal iliac node. He underwent repeat PLND on 2019, with pathology revealing high grade  neuroendocrine tumor in 2/5 right common iliac nodes dissected. This was deemed to be recurrence of his prostate cancer, and he proceeded to undergo salvage radiotherapy in conjunction with ADT. His last Lupron shot was on 10/1/2021. He was seen in follow-up with his medical oncologist, Dr. Rivera, 1/3/2022 at which time they discussed stopping ADT until the testosterone starts to rise again.    He was last seen for follow up 6 months ago, at which time he reported mild intermittent hematuria. He established care with Dr. Owens and underwent cystoscopy on 3/10/2022, with findings consistent with radiation cystitis.     On interview today, the patient states that he is well. His AUA Score is 4/35 while it was previously 4/35.  He does wear a pad due to leakage that he only needs to change once a day.  His libido is recovering. He cannot achieve an erection related to surgery, and he tried to pump and was not satisfied with that.  Is considering seeing urology for injections.  Has an appt in August.  He has only had a couple of episodes of hematuria.  He occasionally sees a small amount of blood per rectum but nothing recently.  He also has fissures on occasion.    He and his wife travel in the winter.       OBJECTIVE:   Vitals:  No vitals were obtained today due to virtual visit.    Physical Exam   GENERAL: Healthy, alert and no distress    PSYCH: Mentation appears normal, affect normal/bright, judgement and insight intact, normal speech and appearance well-groomed.    PSA     6/7/2023 <0.01                                   373   12/16/2022 <0.01          Testosterone 324   07/07/2022 <0.01   12/23/2021 <0.01   10/01/2021 Lupron 45mg   05/03/2021 <0.01   04/02/2021 Lupron 45 mg   11/13/2020 <0.01   09/16/2020 Lupron 45 mg   05/08/2020 <0.01   03/18/2020 Lupron 45 mg   03/04/2020 Salvage Radiotherapy   12/17/2019 Lupron 22.5 mg started   12/17/2020 (pre-RT) 0.34   09/30/2019 0.2   08/29/2019 0.2   05/20/2019 0.1    02/14/2019 0.1   08/14/2018 <0.1   02/12/2018 <0.1   11/14/2017 (post-op) <0.1   08/07/2017 RARP   05/12/2017 2.7   06/10/2015 1.99   09/26/2014 1.64      IMPRESSION: PSA remains undetectable. No clinical evidence for recurrent disease at this time.      RECOMMENDATIONS:    Repeat PSA in 6 months (or early when he is in town over the holidays)  Follow up after PSA with me.         Aicha Stapleton NP  Department of Radiation Oncology  [facility]

## 2023-07-18 NOTE — LETTER
2023         RE: Zander Stallworth  [address]  [facility] [address]        Dear Colleague,    Thank you for referring your patient, Zander Stallworth, to the [facility] RADIATION ONCOLOGY. Please see a copy of my visit note below.      [facility], [place]  Radiation Oncology Follow-up Note  2023       Zander Stallworth   MRN: [id number]  : 1955      DISEASE TREATED: Recurrent high risk prostate cancer, high-grade neuroendocrine differentiation. [id number], Hallie 3+4=7, PSA=0.2.  Axumin PET/CT scan for LN+     INTERVAL SINCE COMPLETION OF RADIATION THERAPY: almost 3.5 years (completed on 3/4/2020)     TYPE OF RADIATION THERAPY ADMINISTERED: 7000 cGy in 35 fractions (4600cGy to LN + Boost)       HORMONAL THERAPY: Plan Indefinite. Lupron 22.5 mg given 2019, 45 mg given 3/18/2020, 2020, 2021, 10/1/2021)     SUBJECTIVE: Mr. Stallworth is a 66 year old man with biochemically and nodally recurrent prostate cancer. His pre-op PSA was 2.7, with highest Hallie's score of 5+3=8. He underwent upfront treatment with RALP and BLND in 2017. There was no evidence of EPE SVI, federica involvement (0/3); and negative margins were achieved.      His post-op PSA was initially undetectable. It mariano to a value of 0.1 on 2/15/2019, and increased to 0.2 on 2019. Axumin PET/CT on 2019 then showed FDG avidity in one right common iliac node and an right internal iliac node. He underwent repeat PLND on 2019, with pathology revealing high grade neuroendocrine tumor in 2/5 right common iliac nodes dissected. This was deemed to be recurrence of his prostate cancer, and he proceeded to undergo salvage radiotherapy in conjunction with ADT. His last Lupron shot was on 10/1/2021. He was seen in follow-up with his medical oncologist, Dr. Rivera, 1/3/2022 at which time they discussed stopping ADT until the testosterone starts to rise again.    He was last seen for  follow up 6 months ago, at which time he reported mild intermittent hematuria. He established care with Dr. Owens and underwent cystoscopy on 3/10/2022, with findings consistent with radiation cystitis.     On interview today, the patient states that he is well. His AUA Score is 4/35 while it was previously 4/35.  He does wear a pad due to leakage that he only needs to change once a day.  His libido is recovering. He cannot achieve an erection related to surgery, and he tried to pump and was not satisfied with that.  Is considering seeing urology for injections.  Has an appt in August.  He has only had a couple of episodes of hematuria.  He occasionally sees a small amount of blood per rectum but nothing recently.  He also has fissures on occasion.    He and his wife travel in the winter.       OBJECTIVE:   Vitals:  No vitals were obtained today due to virtual visit.    Physical Exam   GENERAL: Healthy, alert and no distress    PSYCH: Mentation appears normal, affect normal/bright, judgement and insight intact, normal speech and appearance well-groomed.    PSA     6/7/2023 <0.01                                   373   12/16/2022 <0.01          Testosterone 324   07/07/2022 <0.01   12/23/2021 <0.01   10/01/2021 Lupron 45mg   05/03/2021 <0.01   04/02/2021 Lupron 45 mg   11/13/2020 <0.01   09/16/2020 Lupron 45 mg   05/08/2020 <0.01   03/18/2020 Lupron 45 mg   03/04/2020 Salvage Radiotherapy   12/17/2019 Lupron 22.5 mg started   12/17/2020 (pre-RT) 0.34   09/30/2019 0.2   08/29/2019 0.2   05/20/2019 0.1   02/14/2019 0.1   08/14/2018 <0.1   02/12/2018 <0.1   11/14/2017 (post-op) <0.1   08/07/2017 RARP   05/12/2017 2.7   06/10/2015 1.99   09/26/2014 1.64      IMPRESSION: PSA remains undetectable. No clinical evidence for recurrent disease at this time.      RECOMMENDATIONS:    Repeat PSA in 6 months (or early when he is in town over the holidays)  Follow up after PSA with me.      Aicha Stapleton NP  Department of  Radiation Oncology  [facility]

## 2023-08-01 NOTE — PROGRESS NOTES
Consult conducted via real-time audio/video technology by Que Ferguson PA-C from [facility] to the patient in their home.     REASON FOR VISIT  Erectile dysfunction    HISTORY OF PRESENT ILLNESS  Mr. Stallworth is a 67 year old male who presents today for further discussion of his erectile dysfunction following his prostatectomy in 2017 and subsequent salvage radiation therapy in 2020.  I personally reviewed his most recent radiation oncology visit note from 7/18/2023 in preparation for today's visit.    According to that note, he has been unable to achieve an erection alone since surgery, and has also tried a vacuum erection device without much help.  He requested an appointment to discuss intracavernosal injection.    Today:  Was able to achieve erections with the JULIANA but was not satisfying  Now that he is off ADT, more interested in sex again  Sildenafil lead to the side effect of blue vision  Tadalafil did not but did not help with erections   Endorses some leakage of urine with sex as well    PHYSICAL EXAMINATION  Deferred given virtual visit.    IMPRESSION  Post prostatectomy and post radiation therapy erectile dysfunction  History of [place] 3+4 prostate cancer status post radical on 5/7/2017 and salvage radiation therapy on 3/4/2020 with concurrent ADT  Post-prostatectomy stress incontinence     It was my pleasure to meet with Mr. Stallworth today in regards to his erectile dysfunction. We reviewed the natural history of erectile dysfunction, its progression with age, and its importance as a marker of underlying microvascular or cardiovascular disease. We reviewed potential contributing factors to the development of erectile dysfunction and discussed a stepwise approach to management.    Treatment options discussed include:    Lifestyle changes: Discussed importance of glycemic control, weight loss, exercise, and smoking sensation as that can improve erectile function overall health.    PDE5I  (sildenafil, tadalafil, vardenafil, avanafil): Discussed that if a patient doesn't respond adequately to PDE5-inhibitors, or has bothersome side effects, we may consider trialing an alternative medication. We noted that sexual stimulation is necessary and more than 1 trial may be required. However, many patients will ultimately require a higher dose of, or no longer respond to, these medications. Treatment escalation is indicated in this setting. Common side effects include facial flushing, headache, rhinorrhea, indigestion/dyspepsia, muscle aches, changes in color vision, and light-headedness. Taking a PDE5-inhibitor is contraindicated if the patient is also taking a nitrogen containing medication such as nitroglycerin. The patient was informed of the importance of discussing this with his primary care provider and/or cardiologist.    ICI (intracavernosal injection of alprostadil, phentolamine, papaverine): Typically most effective of the medical treatment options. At one year, 40-70% of patients are still using ICI. Reasons to discontinue include lack of effect, lack of spontaneity, discomfort with the injection, or desire to trial other options. Pain with injection is typically mild and short lived, but on occasion, the medication may cause significant penile aching. This can be addressed by changing the concentration of the specific agents used, but may lead some patients to discontinue therapy. Patients are asked to rotate the site of injection to prevent scar tissue from building up at one site. In patients who are predisposed to form significant scarring on the penis in response to trauma, penile curvature or other shape changes may occur. If the patient is on anticoagulation, he would be at increased risk for hematoma formation at the site of injection.    JULIANA (vacuum erection device): Typically the least natural and most tedious of all options listed above. However, it is an excellent option for some men  and can be well tolerated. Also, it is a great way to get blood flow into the penis and promote penile  physical therapy in an effort to preserve penile length. It can be used in combination with any of the medical therapies described above.    IPP (inflatable penile prosthesis): The most effective of all the options listed above and has a very high patient and partner satisfaction rate, often exceeding 80-90% in published studies. This approach allows the patient to achieve a firm/rigid erection sufficient for penetration that lasts as long as he wants, whenever he wants. It is a surgery that requires general anesthesia and carries a 1-2% risk of infection, and 15-30% risk of mechanical failure at 10 years, requiring the device to be replaced.     We did briefly discuss that the leakage of urine with sex likely relates to post-prostatectomy stress incontinence which he has struggled with previously. This also could have been worsened after his radiation therapy. I offered a repeat referral to pelvic floor physical therapy, but he will defer at this time.     Following the above discussion, Mr. Stallworth would like to consider his options and reach out over [employer] with his decision after traveling next week. He also requests that I send him the info on the JULIANA that we can prescribe which I will do tomorrow. Mr. Stallworth expressed understanding and agreement to the above discussion and plan and all of his questions were answered to his satisfaction.      PLAN:  Consideration of options, will reach out over [employer]     Signed by:    Que Ferguson PA-C    I spent a total of 40 minutes spent on the date of the encounter doing chart review, history and exam, documentation, and further activities as noted above.

## 2023-08-03 ENCOUNTER — PRE VISIT (OUTPATIENT)
Dept: UROLOGY | Facility: CLINIC | Age: 68
End: 2023-08-03

## 2023-08-03 ENCOUNTER — VIRTUAL VISIT (OUTPATIENT)
Dept: UROLOGY | Facility: CLINIC | Age: 68
End: 2023-08-03
Attending: INTERNAL MEDICINE
Payer: MEDICARE

## 2023-08-03 DIAGNOSIS — N99.89 STRESS INCONTINENCE AFTER SURGICAL PROCEDURE: Primary | ICD-10-CM

## 2023-08-03 DIAGNOSIS — N39.3 STRESS INCONTINENCE AFTER SURGICAL PROCEDURE: Primary | ICD-10-CM

## 2023-08-03 DIAGNOSIS — N52.35 ERECTILE DYSFUNCTION FOLLOWING RADIATION THERAPY: ICD-10-CM

## 2023-08-03 PROCEDURE — 99204 OFFICE O/P NEW MOD 45 MIN: CPT | Mod: 95 | Performed by: STUDENT IN AN ORGANIZED HEALTH CARE EDUCATION/TRAINING PROGRAM

## 2023-08-03 ASSESSMENT — PAIN SCALES - GENERAL: PAINLEVEL: NO PAIN (0)

## 2023-08-03 NOTE — LETTER
8/3/2023       RE: Zander Stallworth  [address]  [facility] [address]     Dear Colleague,    Thank you for referring your patient, Zander Stallworth, to the [facility] [place] [facility]. Please see a copy of my visit note below.    Consult conducted via real-time audio/video technology by Que Ferguson PA-C from [facility] to the patient in their home.     REASON FOR VISIT  Erectile dysfunction    HISTORY OF PRESENT ILLNESS  Mr. Stallworth is a 67 year old male who presents today for further discussion of his erectile dysfunction following his prostatectomy in 2017 and subsequent salvage radiation therapy in 2020.  I personally reviewed his most recent radiation oncology visit note from 7/18/2023 in preparation for today's visit.    According to that note, he has been unable to achieve an erection alone since surgery, and has also tried a vacuum erection device without much help.  He requested an appointment to discuss intracavernosal injection.    Today:  Was able to achieve erections with the JULIANA but was not satisfying  Now that he is off ADT, more interested in sex again  Sildenafil lead to the side effect of blue vision  Tadalafil did not but did not help with erections   Endorses some leakage of urine with sex as well    PHYSICAL EXAMINATION  Deferred given virtual visit.    IMPRESSION  Post prostatectomy and post radiation therapy erectile dysfunction  History of Hallie 3+4 prostate cancer status post radical on 5/7/2017 and salvage radiation therapy on 3/4/2020 with concurrent ADT  Post-prostatectomy stress incontinence     It was my pleasure to meet with Mr. Stallworth today in regards to his erectile dysfunction. We reviewed the natural history of erectile dysfunction, its progression with age, and its importance as a marker of underlying microvascular or cardiovascular disease. We reviewed potential contributing factors  to the development of erectile dysfunction and discussed a stepwise approach to management.    Treatment options discussed include:    Lifestyle changes: Discussed importance of glycemic control, weight loss, exercise, and smoking sensation as that can improve erectile function overall health.    PDE5I (sildenafil, tadalafil, vardenafil, avanafil): Discussed that if a patient doesn't respond adequately to PDE5-inhibitors, or has bothersome side effects, we may consider trialing an alternative medication. We noted that sexual stimulation is necessary and more than 1 trial may be required. However, many patients will ultimately require a higher dose of, or no longer respond to, these medications. Treatment escalation is indicated in this setting. Common side effects include facial flushing, headache, rhinorrhea, indigestion/dyspepsia, muscle aches, changes in color vision, and light-headedness. Taking a PDE5-inhibitor is contraindicated if the patient is also taking a nitrogen containing medication such as nitroglycerin. The patient was informed of the importance of discussing this with his primary care provider and/or cardiologist.    ICI (intracavernosal injection of alprostadil, phentolamine, papaverine): Typically most effective of the medical treatment options. At one year, 40-70% of patients are still using ICI. Reasons to discontinue include lack of effect, lack of spontaneity, discomfort with the injection, or desire to trial other options. Pain with injection is typically mild and short lived, but on occasion, the medication may cause significant penile aching. This can be addressed by changing the concentration of the specific agents used, but may lead some patients to discontinue therapy. Patients are asked to rotate the site of injection to prevent scar tissue from building up at one site. In patients who are predisposed to form significant scarring on the penis in response to trauma, penile curvature or  other shape changes may occur. If the patient is on anticoagulation, he would be at increased risk for hematoma formation at the site of injection.    JULIANA (vacuum erection device): Typically the least natural and most tedious of all options listed above. However, it is an excellent option for some men and can be well tolerated. Also, it is a great way to get blood flow into the penis and promote penile  physical therapy in an effort to preserve penile length. It can be used in combination with any of the medical therapies described above.    IPP (inflatable penile prosthesis): The most effective of all the options listed above and has a very high patient and partner satisfaction rate, often exceeding 80-90% in published studies. This approach allows the patient to achieve a firm/rigid erection sufficient for penetration that lasts as long as he wants, whenever he wants. It is a surgery that requires general anesthesia and carries a 1-2% risk of infection, and 15-30% risk of mechanical failure at 10 years, requiring the device to be replaced.     We did briefly discuss that the leakage of urine with sex likely relates to post-prostatectomy stress incontinence which he has struggled with previously. This also could have been worsened after his radiation therapy. I offered a repeat referral to pelvic floor physical therapy, but he will defer at this time.     Following the above discussion, Mr. Stallworth would like to consider his options and reach out over [employer] with his decision after traveling next week. He also requests that I send him the info on the JULIANA that we can prescribe which I will do tomorrow. Mr. Stallowrth expressed understanding and agreement to the above discussion and plan and all of his questions were answered to his satisfaction.      PLAN:  Consideration of options, will reach out over [employer]     Signed by:    Que Ferguson PA-C    I spent a total of 40 minutes spent on the date of the encounter  doing chart review, history and exam, documentation, and further activities as noted above.    Virtual Visit Details    Type of service:  Video Visit   Video Start Time: 2:21 PM  Video End Time:2:50 PM    Originating Location (pt. Location): Home    Distant Location (provider location):  Off-site  Platform used for Video Visit: [employer]

## 2023-08-03 NOTE — PROGRESS NOTES
Virtual Visit Details    Type of service:  Video Visit   Video Start Time: 2:21 PM  Video End Time:2:50 PM    Originating Location (pt. Location): Home    Distant Location (provider location):  Off-site  Platform used for Video Visit: Delfin     unable to assess due to pt with difficulty following commands of assessment due to decreased cognition unable to assess as pt with difficulty following commands of assessment due to decreased cognition

## 2023-08-03 NOTE — NURSING NOTE
Is the patient currently in the state of [place]? YES    Visit mode:VIDEO    If the visit is dropped, the patient can be reconnected by: VIDEO VISIT: Send to e-mail at: [email or web address]    Will anyone else be joining the visit? NO      How would you like to obtain your AVS? MyChart    Are changes needed to the allergy or medication list? NO    Reason for visit: Video Visit (New apt )       Felicity Johnston

## 2023-09-15 ENCOUNTER — TELEPHONE (OUTPATIENT)
Dept: UROLOGY | Facility: CLINIC | Age: 68
End: 2023-09-15
Payer: MEDICARE

## 2023-09-15 NOTE — TELEPHONE ENCOUNTER
[facility] Call Center    Phone Message    May a detailed message be left on voicemail: yes     Reason for Call: Other: pt is ready to move on to the injection, please advise with him     Action Taken: Other: urology    Travel Screening: Not Applicable

## 2023-09-18 NOTE — TELEPHONE ENCOUNTER
Mr. Stallworth would like to proceed with intercavernosal injections for erectile dysfunction.     Zander, will you help me in getting Mr. Stallworth set up for a visit with you for this teaching?     Thank you!

## 2023-09-21 ENCOUNTER — TELEPHONE (OUTPATIENT)
Dept: UROLOGY | Facility: CLINIC | Age: 68
End: 2023-09-21
Payer: MEDICARE

## 2023-09-21 NOTE — CONFIDENTIAL NOTE
Spoke with patient regarding EDEX training.  This author's direct line provided for future questions/concerns.    Zander Mejias, RN  RN Care Coordinator - Urology

## 2023-09-29 ENCOUNTER — ALLIED HEALTH/NURSE VISIT (OUTPATIENT)
Dept: UROLOGY | Facility: CLINIC | Age: 68
End: 2023-09-29
Payer: MEDICARE

## 2023-09-29 DIAGNOSIS — N52.35 ERECTILE DYSFUNCTION FOLLOWING RADIATION THERAPY: Primary | ICD-10-CM

## 2023-09-29 PROCEDURE — 99211 OFF/OP EST MAY X REQ PHY/QHP: CPT

## 2023-09-29 NOTE — PROGRESS NOTES
No chief complaint on file.      Patient Active Problem List   Diagnosis    Acquired deformity of ankle and foot    Blepharitis    Dry eyes    Herpes simplex disciform keratitis    Hypercholesterolemia    Lesion of plantar nerve    Malabsorption of glucose    Malignant neoplasm of prostate (H)    Metatarsalgia    Presbyopia    Sensorineural hearing loss, bilateral    Prostate cancer (H)    Diabetes mellitus, type 2 (H)    Stress incontinence after surgical procedure    Erectile dysfunction following radiation therapy       Allergies   Allergen Reactions    Amoxicillin-Pot Clavulanate      Itching     Erythromycin     Sildenafil      Visual changes with sildenafil.          Current Outpatient Medications:     diclofenac (VOLTAREN) 1 % topical gel, Apply topically as needed, Disp: , Rfl:     fexofenadine (ALLEGRA) 60 MG tablet, Take 60 mg by mouth every morning , Disp: , Rfl:     fluticasone (FLONASE) 50 MCG/ACT nasal spray, Spray 2 sprays into both nostrils daily , Disp: , Rfl:     hydrocortisone (ANUCORT-HC) 25 MG suppository, Place 25 mg rectally 2 times daily PRN, Disp: , Rfl:     metFORMIN (GLUCOPHAGE) 500 MG tablet, Take 500 mg by mouth daily (with breakfast) , Disp: , Rfl:     Multiple Vitamins-Minerals (CENTRUM SILVER PO), Take by mouth every morning , Disp: , Rfl:     simvastatin (ZOCOR) 10 MG tablet, At Bedtime , Disp: , Rfl:     Social History     Tobacco Use    Smoking status: Never    Smokeless tobacco: Never   Substance Use Topics    Alcohol use: Yes     Comment: 10 drinks/week    Drug use: Never       The patient comes into clinic today at the request of Que Ferguson PA-C for intracavernosal injection teaching. I did the injection teaching.      This service provided today was under the direct supervision of Dr. Gloria, who is   available if needed.     The patient presents to clinic for Edex injection teaching.  Edex informational patient packet was read and reviewed in clinic by  patient.  Reviewed usage and possible side effects.  Questions answered appropriately. Discussed there needs to be some element of foreplay after injecting Edex.   This reaches it's full effectiveness in 20 minutes.   Patient injected 10 mcg of Edex into the cavernosa without this writer's assistance.     After 15 minutes, patient reassessed.  Patient rates his erection a 3/10. Patient stated that it would be firm enough for penetration.  Patient educated on alternating sites for injection, left and right side of shaft. Also discussed not to use more than 3 times per week, at least 24 hours between each injection.   Also discussed prolonged erections and need to go to ER if that happens. He would need to go into the ER in 4 hours if his erection is prolonged.   Patient verbalized understanding.   Patient has no erection at this time before.   No further questions.  Patient to call if he has any questions or concerns.  I am asking patient call me after the first injection to discuss the response.  Prescription for Edex 10 mcg sent in to patient's pharmacy.    Prior to injection this author, verified patient identity using patient's name and date of birth.  Due to injection of Edex administration, patient instructed to remain in clinic for 15 minutes  afterwards, and to report any adverse reaction to me immediately.     The following medication was given:      MEDICATION: Edex  ROUTE: Intracavitary  SITE: penis  DOSE: 10 mcg  LOT #: [id number]  :  [employer]  EXPIRATION DATE:  03/2025  NDC#: 39083-163-47  or  [id number]             The following medication was given:     Was there drug waste? No  Multi-dose vial: No    Zander Mejias RN  September 29, 2023

## 2023-10-03 ENCOUNTER — TELEPHONE (OUTPATIENT)
Dept: RADIATION ONCOLOGY | Facility: CLINIC | Age: 68
End: 2023-10-03
Payer: MEDICARE

## 2023-10-03 NOTE — TELEPHONE ENCOUNTER
Called pt to help reschedule their appt w/Aicha to either Dr. Schreiber (in person) or Melba WHITE (telehealth only)  Pt opted to go virtually and was rescheduled to Dec 15 at 10:30 AM  He confirmed appt for video  Thank you

## 2023-10-29 ENCOUNTER — HEALTH MAINTENANCE LETTER (OUTPATIENT)
Age: 68
End: 2023-10-29

## 2023-12-07 ENCOUNTER — LAB (OUTPATIENT)
Dept: LAB | Facility: CLINIC | Age: 68
End: 2023-12-07
Payer: MEDICARE

## 2023-12-07 DIAGNOSIS — C61 PROSTATE CANCER (H): ICD-10-CM

## 2023-12-07 DIAGNOSIS — E11.9 TYPE 2 DIABETES MELLITUS WITHOUT COMPLICATION, WITHOUT LONG-TERM CURRENT USE OF INSULIN (H): ICD-10-CM

## 2023-12-07 LAB
HBA1C MFR BLD: 7.5 %
PSA SERPL DL<=0.01 NG/ML-MCNC: <0.01 NG/ML (ref 0–4.5)
SHBG SERPL-SCNC: 31 NMOL/L (ref 11–80)

## 2023-12-07 PROCEDURE — 84153 ASSAY OF PSA TOTAL: CPT | Performed by: PATHOLOGY

## 2023-12-07 PROCEDURE — 84403 ASSAY OF TOTAL TESTOSTERONE: CPT | Performed by: NURSE PRACTITIONER

## 2023-12-07 PROCEDURE — 83036 HEMOGLOBIN GLYCOSYLATED A1C: CPT | Performed by: FAMILY MEDICINE

## 2023-12-07 PROCEDURE — 36415 COLL VENOUS BLD VENIPUNCTURE: CPT | Performed by: PATHOLOGY

## 2023-12-07 PROCEDURE — 99000 SPECIMEN HANDLING OFFICE-LAB: CPT | Performed by: PATHOLOGY

## 2023-12-07 PROCEDURE — 84270 ASSAY OF SEX HORMONE GLOBUL: CPT | Performed by: NURSE PRACTITIONER

## 2023-12-09 LAB
TESTOST FREE SERPL-MCNC: 7.64 NG/DL
TESTOST SERPL-MCNC: 366 NG/DL (ref 240–950)

## 2023-12-15 ENCOUNTER — VIRTUAL VISIT (OUTPATIENT)
Dept: RADIATION ONCOLOGY | Facility: CLINIC | Age: 68
End: 2023-12-15
Attending: RADIOLOGY
Payer: MEDICARE

## 2023-12-15 DIAGNOSIS — C61 PROSTATE CANCER (H): Primary | ICD-10-CM

## 2023-12-15 PROCEDURE — 99212 OFFICE O/P EST SF 10 MIN: CPT | Mod: 95 | Performed by: PHYSICIAN ASSISTANT

## 2023-12-15 NOTE — PROGRESS NOTES
[facility], [place]  Radiation Oncology Follow-up Note  12/15/23       Zander Stallworth   MRN: [id number]  : 1955     Radiation Oncologist: Doug Schreiber MD  Provider: CINDY Pearson  Last clinic visit 23 with Aicha Stapleton NP         DISEASE TREATED: Recurrent high risk prostate cancer, high-grade neuroendocrine differentiation. [id number], Hallie 3+4=7, PSA=0.2.  Axumin PET/CT scan for LN+     INTERVAL SINCE COMPLETION OF RADIATION THERAPY: 3 years 10 mo (completed on 3/4/2020)     TYPE OF RADIATION THERAPY ADMINISTERED: 7000 cGy in 35 fractions (4600cGy to LN + Boost)       HORMONAL THERAPY: Plan Indefinite. Lupron 22.5 mg given 2019, 45 mg given 3/18/2020, 2020, 2021, 10/1/2021)     SUBJECTIVE: Mr. Stallworth is a 68year old man with biochemically and nodally recurrent prostate cancer. His pre-op PSA was 2.7, with highest Ennis's score of 5+3=8. He underwent upfront treatment with RALP and BLND in 2017. There was no evidence of EPE SVI, federica involvement (0/3); and negative margins were achieved.      His post-op PSA was initially undetectable. It mariano to a value of 0.1 on 2/15/2019, and increased to 0.2 on 2019. Axumin PET/CT on 2019 then showed FDG avidity in one right common iliac node and an right internal iliac node. He underwent repeat PLND on 2019, with pathology revealing high grade neuroendocrine tumor in 2/5 right common iliac nodes dissected. This was deemed to be recurrence of his prostate cancer, and he proceeded to undergo salvage radiotherapy in conjunction with ADT. His last Lupron shot was on 10/1/2021. He was seen in follow-up with his medical oncologist, Dr. Rivera, 1/3/2022 at which time they discussed stopping ADT until the testosterone starts to rise again.    He was last seen for follow up 6 months ago, at which time he reported mild intermittent hematuria. He established care with Dr. Owens and underwent  EXAM: CT brain without contrast



HISTORY: Right arm and hand weakness for 2 days



COMPARISON: 6/17/2018



TECHNIQUE: Multiple contiguous axial images were obtained and a CT of the brain without contrast.



FINDINGS: There are scattered hypodensities in the subcortical and periventricular white matter consi
stent with small vessel ischemic disease. Encephalomalacia seen in the right parietal lobe,

unchanged. There is no evidence of hydrocephalus, intracranial hemorrhage, or extra-axial fluid colle
ction.



The calvarium and overlying soft tissues are unremarkable. The visualized paranasal sinuses and masto
id air cells are well aerated.



IMPRESSION: No evidence of acute intracranial abnormality



Reported By: José Miguel Beltran 

Electronically Signed:  12/29/2019 2:03 PM cystoscopy on 3/10/2022, with findings consistent with radiation cystitis.     On interview today, the patient states that he is well. His AUA Score is 4/35 while it was previously 4/35.  He does wear a pad due to leakage that he only needs to change once a day.  His libido is recovering. He cannot achieve an erection related to surgery, and he tried to pump and was not satisfied with that.  Saw urology for injections in August.  He has only had a couple of episodes of hematuria, this happens every few months. He occasionally sees a small amount of blood per rectum but nothing recently.  He also has fissures on occasion.      Has some urgency. Has had some accidents     OBJECTIVE:   Vitals:  No vitals were obtained today due to virtual visit.    Physical Exam   GENERAL: Healthy, alert and no distress  Breathing comfortably on room air    PSYCH: Mentation appears normal, affect normal/bright, judgement and insight intact, normal speech and a    12/7/23   <0.01                                            6/7/2023 <0.01                                   373   12/16/2022 <0.01          Testosterone 324   07/07/2022 <0.01   12/23/2021 <0.01   10/01/2021 Lupron 45mg   05/03/2021 <0.01   04/02/2021 Lupron 45 mg   11/13/2020 <0.01   09/16/2020 Lupron 45 mg   05/08/2020 <0.01   03/18/2020 Lupron 45 mg   03/04/2020 Salvage Radiotherapy   12/17/2019 Lupron 22.5 mg started   12/17/2020 (pre-RT) 0.34   09/30/2019 0.2   08/29/2019 0.2   05/20/2019 0.1   02/14/2019 0.1   08/14/2018 <0.1   02/12/2018 <0.1   11/14/2017 (post-op) <0.1   08/07/2017 RARP   05/12/2017 2.7   06/10/2015 1.99   09/26/2014 1.64      IMPRESSION: PSA remains undetectable. No clinical evidence for recurrent disease at this time.      RECOMMENDATIONS:    Repeat PSA in 6 months with testosterone per his request  Follow up after PSA with me.         CINDY Pearson  Department of Radiation Oncology  [facility]

## 2023-12-15 NOTE — LETTER
12/15/2023         RE: Zander Stallworth  [address]  [facility] [address]        Dear Colleague,    Thank you for referring your patient, Zander Stallworth, to the [facility] RADIATION ONCOLOGY. Please see a copy of my visit note below.          [facility], [place]  Radiation Oncology Follow-up Note  12/15/23       Zander Stallworth   MRN: [id number]  : 1955     Radiation Oncologist: Doug Schreiber MD  Provider: ICNDY Pearson  Last clinic visit 23 with Aicha Stapleton NP         DISEASE TREATED: Recurrent high risk prostate cancer, high-grade neuroendocrine differentiation. [id number], Marienthal 3+4=7, PSA=0.2.  Axumin PET/CT scan for LN+     INTERVAL SINCE COMPLETION OF RADIATION THERAPY: 3 years 10 mo (completed on 3/4/2020)     TYPE OF RADIATION THERAPY ADMINISTERED: 7000 cGy in 35 fractions (4600cGy to LN + Boost)       HORMONAL THERAPY: Plan Indefinite. Lupron 22.5 mg given 2019, 45 mg given 3/18/2020, 2020, 2021, 10/1/2021)     SUBJECTIVE: Mr. Stallworth is a 68year old man with biochemically and nodally recurrent prostate cancer. His pre-op PSA was 2.7, with highest Hallie's score of 5+3=8. He underwent upfront treatment with RALP and BLND in 2017. There was no evidence of EPE SVI, federica involvement (0/3); and negative margins were achieved.      His post-op PSA was initially undetectable. It mariano to a value of 0.1 on 2/15/2019, and increased to 0.2 on 2019. Axumin PET/CT on 2019 then showed FDG avidity in one right common iliac node and an right internal iliac node. He underwent repeat PLND on 2019, with pathology revealing high grade neuroendocrine tumor in 2/5 right common iliac nodes dissected. This was deemed to be recurrence of his prostate cancer, and he proceeded to undergo salvage radiotherapy in conjunction with ADT. His last Lupron shot was on 10/1/2021. He was seen in follow-up with his medical oncologist, Dr. Rivera,  1/3/2022 at which time they discussed stopping ADT until the testosterone starts to rise again.    He was last seen for follow up 6 months ago, at which time he reported mild intermittent hematuria. He established care with Dr. Owens and underwent cystoscopy on 3/10/2022, with findings consistent with radiation cystitis.     On interview today, the patient states that he is well. His AUA Score is 4/35 while it was previously 4/35.  He does wear a pad due to leakage that he only needs to change once a day.  His libido is recovering. He cannot achieve an erection related to surgery, and he tried to pump and was not satisfied with that.  Saw urology for injections in August.  He has only had a couple of episodes of hematuria, this happens every few months. He occasionally sees a small amount of blood per rectum but nothing recently.  He also has fissures on occasion.      Has some urgency. Has had some accidents     OBJECTIVE:   Vitals:  No vitals were obtained today due to virtual visit.    Physical Exam   GENERAL: Healthy, alert and no distress  Breathing comfortably on room air    PSYCH: Mentation appears normal, affect normal/bright, judgement and insight intact, normal speech and a    12/7/23   <0.01                                            6/7/2023 <0.01                                   373   12/16/2022 <0.01          Testosterone 324   07/07/2022 <0.01   12/23/2021 <0.01   10/01/2021 Lupron 45mg   05/03/2021 <0.01   04/02/2021 Lupron 45 mg   11/13/2020 <0.01   09/16/2020 Lupron 45 mg   05/08/2020 <0.01   03/18/2020 Lupron 45 mg   03/04/2020 Salvage Radiotherapy   12/17/2019 Lupron 22.5 mg started   12/17/2020 (pre-RT) 0.34   09/30/2019 0.2   08/29/2019 0.2   05/20/2019 0.1   02/14/2019 0.1   08/14/2018 <0.1   02/12/2018 <0.1   11/14/2017 (post-op) <0.1   08/07/2017 RARP   05/12/2017 2.7   06/10/2015 1.99   09/26/2014 1.64      IMPRESSION: PSA remains undetectable. No clinical evidence for recurrent disease  at this time.      RECOMMENDATIONS:    Repeat PSA in 6 months with testosterone per his request  Follow up after PSA with me.         CINDY Pearson  Department of Radiation Oncology  [facility]        Again, thank you for allowing me to participate in the care of your patient.        Sincerely,        Melba Ceballos PA-C

## 2024-02-22 ENCOUNTER — LAB (OUTPATIENT)
Dept: LAB | Facility: CLINIC | Age: 69
End: 2024-02-22
Payer: COMMERCIAL

## 2024-02-22 ENCOUNTER — TELEPHONE (OUTPATIENT)
Dept: UROLOGY | Facility: CLINIC | Age: 69
End: 2024-02-22
Payer: COMMERCIAL

## 2024-02-22 DIAGNOSIS — R35.0 URINARY FREQUENCY: Primary | ICD-10-CM

## 2024-02-22 DIAGNOSIS — R35.0 URINARY FREQUENCY: ICD-10-CM

## 2024-02-22 LAB
ALBUMIN UR-MCNC: NEGATIVE MG/DL
APPEARANCE UR: CLEAR
BACTERIA #/AREA URNS HPF: ABNORMAL /HPF
BILIRUB UR QL STRIP: NEGATIVE
COLOR UR AUTO: YELLOW
GLUCOSE UR STRIP-MCNC: NEGATIVE MG/DL
HGB UR QL STRIP: NEGATIVE
KETONES UR STRIP-MCNC: 15 MG/DL
LEUKOCYTE ESTERASE UR QL STRIP: NEGATIVE
NITRATE UR QL: NEGATIVE
PH UR STRIP: 6.5 [PH] (ref 5–8)
RBC #/AREA URNS AUTO: ABNORMAL /HPF
SP GR UR STRIP: 1.02 (ref 1–1.03)
SQUAMOUS #/AREA URNS AUTO: ABNORMAL /LPF
UROBILINOGEN UR STRIP-ACNC: 0.2 E.U./DL
WBC #/AREA URNS AUTO: ABNORMAL /HPF

## 2024-02-22 PROCEDURE — 81001 URINALYSIS AUTO W/SCOPE: CPT

## 2024-02-22 PROCEDURE — 87086 URINE CULTURE/COLONY COUNT: CPT

## 2024-02-22 NOTE — TELEPHONE ENCOUNTER
I agree with the urine culture and urinalysis currently ordered.  We can get him a first available follow-up visit with me to discuss the symptoms and possible interventions.  We will also follow-up on urine results if anything needs to be intervened upon.

## 2024-02-22 NOTE — TELEPHONE ENCOUNTER
[facility] Call Center    Phone Message    May a detailed message be left on voicemail: no     Reason for Call: Other: Patient called to let us know that he is having a lot more leakage than normal and has been increasing. He is also having some symptoms while urinating as well. Patient stated that this has been going on several weeks.     Action Taken: Other: [facility]    Travel Screening: Not Applicable

## 2024-02-22 NOTE — TELEPHONE ENCOUNTER
Patient calls with an increase in leaking accompanied with a scratchy feeling with urination x a couple weeks and seems to be getting worse. He said he wouldn't describe it as a burning sensation but feels something different.  He denied urgency and states he typically does not have this and empties his bladder based on timing throughout the day. He has been wearing a small thin pad for years and has had to increase to wearing a larger thick pad when walking due to heavily saturating this. He is post prostatectomy in 2017, is not currently on any bladder meds.   Last seen 08/2023.     UA/UC ordered to rule out infection. Will follow up with results.   Ann-Marie Fernandez RN on 2/22/2024 at 11:39 AM

## 2024-02-24 LAB — BACTERIA UR CULT: NO GROWTH

## 2024-03-17 ENCOUNTER — HEALTH MAINTENANCE LETTER (OUTPATIENT)
Age: 69
End: 2024-03-17

## 2024-03-22 NOTE — PROGRESS NOTES
Subjective     REASON FOR VISIT  Irritative lower urinary tract symptoms     HISTORY OF PRESENT ILLNESS  Mr. Stallworth is a pleasant 68 year old male who I am speaking with today in regards to his more recent irritative voiding symptoms.  I first met Zander on 8/3/2023 in regards to issues with erectile dysfunction, but he has been following with our department for many years given his history of prostate cancer status post prostatectomy in 2017 and subsequent radiation therapy in 2020.  Currently follows with medical oncology for his history of prostate cancer.    Our visit on 8/3/2023 ended with me informing him of different options for erectile dysfunction and him ultimately choosing [facility]CI and completed Edex training on 9/29/2023.  However, in February 2024, contacted us with concerns for UTI given increased irritative LUTS.  Of note, he does have a history of cystoscopy showing radiation cystitis.  Urine testing was completed which did not show any evidence of infection but he did request a visit to discuss these lower urinary tract symptoms.    Today:  Increase in leakage mainly with exercise   One episode of insensate leakage overnight   Had to upgrade to a Depends-brand heavy pad versus the light pad he used to wear  Not associated with urge most of the time   Has limited his caffeine intake recently   Some consistent irritation at the tip of the penis when urinating   Continued gross hematuria   States the alprostadil injections have not been working for him    Objective     PHYSICAL EXAMINATION  General: Alert, oriented, no acute distress    LABS   Latest Reference Range & Units 02/22/24 16:42   Color Urine Colorless, Straw, Light Yellow, Yellow  Yellow   Appearance Urine Clear  Clear   Glucose Urine Negative mg/dL Negative   Bilirubin Urine Negative  Negative   Ketones Urine Negative mg/dL 15 !   Specific Gravity Urine 1.005 - 1.030  1.020   pH Urine 5.0 - 8.0  6.5   Protein Albumin Urine Negative mg/dL Negative    Urobilinogen Urine 0.2, 1.0 E.U./dL 0.2   Nitrite Urine Negative  Negative   Blood Urine Negative  Negative   Leukocyte Esterase Urine Negative  Negative   WBC Urine 0-5 /HPF /HPF None Seen   RBC Urine 0-2 /HPF /HPF None Seen   Bacteria Urine None Seen /HPF None Seen   Squamous Epithelial /LPF Urine None Seen /LPF Few !   !: Data is abnormal    Lab Results   Component Value Date    A1C 7.5 (H) 12/07/2023    A1C 7.6 (H) 06/07/2023    A1C 10.6 (H) 12/16/2022    A1C 6.7 (H) 07/07/2022    A1C 6.9 (H) 05/03/2021    A1C 6.1 (H) 11/13/2020      Assessment   Irritative lower urinary tract symptoms   Radiation cystitis   Erectile dysfunction  Post-prostatectomy stress incontinence      It was my pleasure to meet with Zander in follow-up for his worsening post prostatectomy and radiation stress incontinence as well as his issues with erectile dysfunction.  After reviewing his clinical history, I am not necessarily concerned that anything dangerous going on.  The most likely etiology of his worsening incontinence is due to his radiation therapy completed in 2020.  We reviewed that the main bothersome side effects of radiation tend to be present years after the radiation, and it is unclear how long they will continue to worsen.  We then reviewed different potential interventions including repeat referral to pelvic floor physical therapy, Cardenas clamp which she had tried before and was not a fan of, condom catheters, or a referral to Dr. Mcgovern for discussion of artificial urinary sphincter.    After reviewing all of the above options, Zander is not sure he wants to proceed with anything more than the increased pads he is currently using.  We did discuss trialing a medication like oxybutynin to try and assist with the irritation at the tip of his penis during urination, but he does not feel that the benefit would outweigh the bothersome side effects of dry mouth, dry eyes, and constipation.    In regards to his continued  issues with erectile dysfunction, we discussed switching his injectable medication from alprostadil to Trimix, but at this time he is not certain he wants to continue with the injections as these are not particularly pleasant.  At this time he prefer to speak with his wife about different options and how he would like to proceed which is reasonable.    We will plan to follow-up in 3 months virtually to check in on his symptoms and how he would like to proceed. Mr. Stallworth expressed understanding and agreement to the above discussion and plan and all of his questions were answered to his satisfaction.      PLAN  Consideration of options for stress incontinence and erectile dysfunction  Follow-up virtually with me in 3 months    SIGNED:    Que Ferguson PA-C    I spent a total of 38 minutes spent on the date of the encounter doing chart review, history and exam, documentation, and further activities as noted above.

## 2024-03-26 ENCOUNTER — PRE VISIT (OUTPATIENT)
Dept: UROLOGY | Facility: CLINIC | Age: 69
End: 2024-03-26
Payer: COMMERCIAL

## 2024-03-26 NOTE — CONFIDENTIAL NOTE
Reason for visit: discuss symptoms and possible interventions     Relevant information: urinary frequency, stress incontinence, erectile dysfunction    Records/imaging/labs/orders: in epic    At Rooming: pvr    Maribel Gamble  3/26/2024  12:55 PM

## 2024-03-27 ENCOUNTER — OFFICE VISIT (OUTPATIENT)
Dept: UROLOGY | Facility: CLINIC | Age: 69
End: 2024-03-27
Payer: COMMERCIAL

## 2024-03-27 VITALS
BODY MASS INDEX: 28.26 KG/M2 | HEIGHT: 70 IN | HEART RATE: 86 BPM | DIASTOLIC BLOOD PRESSURE: 69 MMHG | SYSTOLIC BLOOD PRESSURE: 121 MMHG | WEIGHT: 197.4 LBS

## 2024-03-27 DIAGNOSIS — R35.0 URINARY FREQUENCY: Primary | ICD-10-CM

## 2024-03-27 DIAGNOSIS — N39.3 STRESS INCONTINENCE AFTER SURGICAL PROCEDURE: ICD-10-CM

## 2024-03-27 DIAGNOSIS — N52.35 ERECTILE DYSFUNCTION FOLLOWING RADIATION THERAPY: ICD-10-CM

## 2024-03-27 DIAGNOSIS — N99.89 STRESS INCONTINENCE AFTER SURGICAL PROCEDURE: ICD-10-CM

## 2024-03-27 PROCEDURE — 99214 OFFICE O/P EST MOD 30 MIN: CPT | Performed by: STUDENT IN AN ORGANIZED HEALTH CARE EDUCATION/TRAINING PROGRAM

## 2024-03-27 RX ORDER — OXYBUTYNIN CHLORIDE 5 MG/1
5 TABLET, EXTENDED RELEASE ORAL DAILY
Qty: 30 TABLET | Refills: 11 | Status: SHIPPED | OUTPATIENT
Start: 2024-03-27

## 2024-03-27 ASSESSMENT — PAIN SCALES - GENERAL: PAINLEVEL: NO PAIN (0)

## 2024-03-27 NOTE — NURSING NOTE
"Chief Complaint   Patient presents with    RECHECK     Irritative lower urinary tract symptoms (LUTs), Dysuria       Blood pressure 121/69, pulse 86, height 1.778 m (5' 10\"), weight 89.5 kg (197 lb 6.4 oz). Body mass index is 28.32 kg/m .    Patient Active Problem List   Diagnosis    Acquired deformity of ankle and foot    Blepharitis    Dry eyes    Herpes simplex disciform keratitis    Hypercholesterolemia    Lesion of plantar nerve    Malabsorption of glucose    Malignant neoplasm of prostate (H)    Metatarsalgia    Presbyopia    Sensorineural hearing loss, bilateral    Prostate cancer (H)    Diabetes mellitus, type 2 (H)    Stress incontinence after surgical procedure    Erectile dysfunction following radiation therapy       Allergies   Allergen Reactions    Amoxicillin-Pot Clavulanate      Itching     Erythromycin     Sildenafil      Visual changes with sildenafil.        Current Outpatient Medications   Medication Sig Dispense Refill    COMPOUNDED NON-CONTROLLED SUBSTANCE (CMPD RX) - PHARMACY TO MIX COMPOUNDED MEDICATION Alprostadil 50 mcg inject 0.2 mL intracavitary as needed no more than three times weekly with at least twenty-four hours between injections 5 mL 3    diclofenac (VOLTAREN) 1 % topical gel Apply topically as needed      fexofenadine (ALLEGRA) 60 MG tablet Take 60 mg by mouth every morning       fluticasone (FLONASE) 50 MCG/ACT nasal spray Spray 2 sprays into both nostrils daily       hydrocortisone (ANUCORT-HC) 25 MG suppository Place 25 mg rectally 2 times daily PRN      metFORMIN (GLUCOPHAGE) 500 MG tablet Take 500 mg by mouth daily (with breakfast)       Multiple Vitamins-Minerals (CENTRUM SILVER PO) Take by mouth every morning       simvastatin (ZOCOR) 10 MG tablet At Bedtime          Social History     Tobacco Use    Smoking status: Never    Smokeless tobacco: Never   Substance Use Topics    Alcohol use: Yes     Comment: 10 drinks/week    Drug use: Never       Kristen Damon, " MA  3/27/2024  11:28 AM

## 2024-03-27 NOTE — LETTER
3/27/2024       RE: Zander Stallworth  [address]  [facility] [address]     Dear Colleague,    Thank you for referring your patient, Zander Stallworth, to the [facility] [place] at [facility]. Please see a copy of my visit note below.    .    Subjective    REASON FOR VISIT  Irritative lower urinary tract symptoms     HISTORY OF PRESENT ILLNESS  Mr. Stallworth is a pleasant 68 year old male who I am speaking with today in regards to his more recent irritative voiding symptoms.  I first met Zander on 8/3/2023 in regards to issues with erectile dysfunction, but he has been following with our department for many years given his history of prostate cancer status post prostatectomy in 2017 and subsequent radiation therapy in 2020.  Currently follows with medical oncology for his history of prostate cancer.    Our visit on 8/3/2023 ended with me informing him of different options for erectile dysfunction and him ultimately choosing [facility] and completed Edex training on 9/29/2023.  However, in February 2024, contacted us with concerns for UTI given increased irritative LUTS.  Of note, he does have a history of cystoscopy showing radiation cystitis.  Urine testing was completed which did not show any evidence of infection but he did request a visit to discuss these lower urinary tract symptoms.    Today:  Increase in leakage mainly with exercise   One episode of insensate leakage overnight   Had to upgrade to a Depends-brand heavy pad versus the light pad he used to wear  Not associated with urge most of the time   Has limited his caffeine intake recently   Some consistent irritation at the tip of the penis when urinating   Continued gross hematuria   States the alprostadil injections have not been working for him    Objective    PHYSICAL EXAMINATION  General: Alert, oriented, no acute distress    LABS   Latest Reference Range & Units 02/22/24 16:42   Color Urine  Colorless, Straw, Light Yellow, Yellow  Yellow   Appearance Urine Clear  Clear   Glucose Urine Negative mg/dL Negative   Bilirubin Urine Negative  Negative   Ketones Urine Negative mg/dL 15 !   Specific Gravity Urine 1.005 - 1.030  1.020   pH Urine 5.0 - 8.0  6.5   Protein Albumin Urine Negative mg/dL Negative   Urobilinogen Urine 0.2, 1.0 E.U./dL 0.2   Nitrite Urine Negative  Negative   Blood Urine Negative  Negative   Leukocyte Esterase Urine Negative  Negative   WBC Urine 0-5 /HPF /HPF None Seen   RBC Urine 0-2 /HPF /HPF None Seen   Bacteria Urine None Seen /HPF None Seen   Squamous Epithelial /LPF Urine None Seen /LPF Few !   !: Data is abnormal    Lab Results   Component Value Date    A1C 7.5 (H) 12/07/2023    A1C 7.6 (H) 06/07/2023    A1C 10.6 (H) 12/16/2022    A1C 6.7 (H) 07/07/2022    A1C 6.9 (H) 05/03/2021    A1C 6.1 (H) 11/13/2020      Assessment  Irritative lower urinary tract symptoms   Radiation cystitis   Erectile dysfunction  Post-prostatectomy stress incontinence      It was my pleasure to meet with Zander in follow-up for his worsening post prostatectomy and radiation stress incontinence as well as his issues with erectile dysfunction.  After reviewing his clinical history, I am not necessarily concerned that anything dangerous going on.  The most likely etiology of his worsening incontinence is due to his radiation therapy completed in 2020.  We reviewed that the main bothersome side effects of radiation tend to be present years after the radiation, and it is unclear how long they will continue to worsen.  We then reviewed different potential interventions including repeat referral to pelvic floor physical therapy, Cardenas clamp which she had tried before and was not a fan of, condom catheters, or a referral to Dr. Mcgovern for discussion of artificial urinary sphincter.    After reviewing all of the above options, Zander is not sure he wants to proceed with anything more than the increased pads he  is currently using.  We did discuss trialing a medication like oxybutynin to try and assist with the irritation at the tip of his penis during urination, but he does not feel that the benefit would outweigh the bothersome side effects of dry mouth, dry eyes, and constipation.    In regards to his continued issues with erectile dysfunction, we discussed switching his injectable medication from alprostadil to Trimix, but at this time he is not certain he wants to continue with the injections as these are not particularly pleasant.  At this time he prefer to speak with his wife about different options and how he would like to proceed which is reasonable.    We will plan to follow-up in 3 months virtually to check in on his symptoms and how he would like to proceed. Mr. Stallworth expressed understanding and agreement to the above discussion and plan and all of his questions were answered to his satisfaction.      PLAN  Consideration of options for stress incontinence and erectile dysfunction  Follow-up virtually with me in 3 months    SIGNED:    Que Ferguson PA-C    I spent a total of 38 minutes spent on the date of the encounter doing chart review, history and exam, documentation, and further activities as noted above.

## 2024-06-18 ENCOUNTER — LAB (OUTPATIENT)
Dept: LAB | Facility: CLINIC | Age: 69
End: 2024-06-18
Payer: COMMERCIAL

## 2024-06-18 DIAGNOSIS — C61 PROSTATE CANCER (H): ICD-10-CM

## 2024-06-18 LAB — PSA SERPL DL<=0.01 NG/ML-MCNC: <0.01 NG/ML (ref 0–6.5)

## 2024-06-18 PROCEDURE — 84153 ASSAY OF PSA TOTAL: CPT | Performed by: PATHOLOGY

## 2024-06-18 PROCEDURE — 84403 ASSAY OF TOTAL TESTOSTERONE: CPT | Performed by: PHYSICIAN ASSISTANT

## 2024-06-18 PROCEDURE — 36415 COLL VENOUS BLD VENIPUNCTURE: CPT | Performed by: PATHOLOGY

## 2024-06-18 PROCEDURE — 99000 SPECIMEN HANDLING OFFICE-LAB: CPT | Performed by: PATHOLOGY

## 2024-06-20 LAB — TESTOST SERPL-MCNC: 267 NG/DL (ref 240–950)

## 2024-06-21 ENCOUNTER — VIRTUAL VISIT (OUTPATIENT)
Dept: RADIATION ONCOLOGY | Facility: CLINIC | Age: 69
End: 2024-06-21
Attending: RADIOLOGY
Payer: COMMERCIAL

## 2024-06-21 DIAGNOSIS — C61 PROSTATE CANCER (H): Primary | ICD-10-CM

## 2024-06-21 PROCEDURE — 99213 OFFICE O/P EST LOW 20 MIN: CPT | Mod: 93 | Performed by: PHYSICIAN ASSISTANT

## 2024-06-21 NOTE — LETTER
2024      Zander Stallworth  [address]  [facility] [address]      Dear Colleague,    Thank you for referring your patient, Zander Stallworth, to the [facility] RADIATION ONCOLOGY. Please see a copy of my visit note below.      [facility] [place]  Radiation Oncology Follow-up Note  24      Zander Stallworth   MRN: [id number]  : 1955     Radiation Oncologist: Doug Schreiber MD  Provider: CINDY Pearson  Last clinic visit: 12/15/23    Telephone visit pt in [place], provider in clinic      DISEASE TREATED: Recurrent high risk prostate cancer, high-grade neuroendocrine differentiation. [id number], Hallie 3+4=7, PSA=0.2.  Axumin PET/CT scan for LN+     INTERVAL SINCE COMPLETION OF RADIATION THERAPY: 4 years 3 mo (completed on 3/4/2020)     TYPE OF RADIATION THERAPY ADMINISTERED: 7000 cGy in 35 fractions (4600cGy to LN + Boost)       HORMONAL THERAPY: Plan Indefinite. Lupron 22.5 mg given 2019, 45 mg given 3/18/2020, 2020, 2021, 10/1/2021)     SUBJECTIVE: Mr. Stallworth is a 68 year old man with biochemically and nodally recurrent prostate cancer. His pre-op PSA was 2.7, with highest Hallie's score of 5+3=8. He underwent upfront treatment with RALP and BLND in 2017. There was no evidence of EPE SVI, federica involvement (0/3); and negative margins were achieved.      His post-op PSA was initially undetectable. It mariano to a value of 0.1 on 2/15/2019, and increased to 0.2 on 2019. Axumin PET/CT on 2019 then showed FDG avidity in one right common iliac node and an right internal iliac node. He underwent repeat PLND on 2019, with pathology revealing high grade neuroendocrine tumor in 2/5 right common iliac nodes dissected. This was deemed to be recurrence of his prostate cancer, and he proceeded to undergo salvage radiotherapy in conjunction with ADT. His last Lupron shot was on 10/1/2021. He was seen in follow-up with his medical oncologist,   "Miguel, 1/3/2022 at which time they discussed stopping ADT until the testosterone starts to rise again.    In 2022  he reported mild intermittent hematuria. He established care with Dr. Owens and underwent cystoscopy on 3/10/2022, with findings consistent with radiation cystitis.     On interview today, the patient states that he is well.  He has leakage and wears a pad. This is worse than 6 months ago. He used to wear one thin pad a day. Now he wears a thick pad. If he walks or does physical work he leaks more. He saw Urology PA Que Ferguson on 3/27/24 who discussed various options including pelvic floor rehab, clamping and artificial urinary sphincter.     His libido is recovering. He cannot achieve an erection related to surgery, and he tried to pump and was not . satisfied with that.  Saw urology for injections in August.       OBJECTIVE:   193.5 lb  5'10\"    Vitals:  No vitals were obtained today due to virtual visit.    Physical Exam   GENERAL: Healthy, alert and no distress  Breathing comfortably on room air    PSYCH: Mentation appears normal, affect normal/bright, judgement and insight intact, normal speech and a  6/18/24   < 0.01                    267   12/7/23   <0.01                                            6/7/2023 <0.01                                   373   12/16/2022 <0.01          Testosterone 324   07/07/2022 <0.01   12/23/2021 <0.01   10/01/2021 Lupron 45mg   05/03/2021 <0.01   04/02/2021 Lupron 45 mg   11/13/2020 <0.01   09/16/2020 Lupron 45 mg   05/08/2020 <0.01   03/18/2020 Lupron 45 mg   03/04/2020 Salvage Radiotherapy   12/17/2019 Lupron 22.5 mg started   12/17/2020 (pre-RT) 0.34   09/30/2019 0.2   08/29/2019 0.2   05/20/2019 0.1   02/14/2019 0.1   08/14/2018 <0.1   02/12/2018 <0.1   11/14/2017 (post-op) <0.1   08/07/2017 RARP   05/12/2017 2.7   06/10/2015 1.99   09/26/2014 1.64      IMPRESSION: PSA remains undetectable. No clinical evidence for recurrent disease at this time. "   Worsening incontinence.     RECOMMENDATIONS:    Repeat PSA in 6 months (with testosterone per his request)  Referral to PT for pelvic floor rehab.   Keep appt with Urology next week.          CINDY Pearson  Department of Radiation Oncology  [facility]    Telephone visit lasted 20 minutes 11:00 to 11:20 AM

## 2024-06-21 NOTE — PROGRESS NOTES
[facility], [place]  Radiation Oncology Follow-up Note  24      Zander Stallworth   MRN: [id number]  : 1955     Radiation Oncologist: Doug Schreiber MD  Provider: CINDY Pearson  Last clinic visit: 12/15/23    Telephone visit [place] in [place], provider in clinic      DISEASE TREATED: Recurrent high risk prostate cancer, high-grade neuroendocrine differentiation. [id number], [place] 3+4=7, PSA=0.2.  Axumin PET/CT scan for LN+     INTERVAL SINCE COMPLETION OF RADIATION THERAPY: 4 years 3 mo (completed on 3/4/2020)     TYPE OF RADIATION THERAPY ADMINISTERED: 7000 cGy in 35 fractions (4600cGy to LN + Boost)       HORMONAL THERAPY: Plan Indefinite. Lupron 22.5 mg given 2019, 45 mg given 3/18/2020, 2020, 2021, 10/1/2021)     SUBJECTIVE: Mr. Stallworth is a 68 year old man with biochemically and nodally recurrent prostate cancer. His pre-op PSA was 2.7, with highest [place]'s score of 5+3=8. He underwent upfront treatment with RALP and BLND in 2017. There was no evidence of EPE SVI, federica involvement (0/3); and negative margins were achieved.      His post-op PSA was initially undetectable. It mariano to a value of 0.1 on 2/15/2019, and increased to 0.2 on 2019. Axumin PET/CT on 2019 then showed FDG avidity in one right common iliac node and an right internal iliac node. He underwent repeat PLND on 2019, with pathology revealing high grade neuroendocrine tumor in 2/5 right common iliac nodes dissected. This was deemed to be recurrence of his prostate cancer, and he proceeded to undergo salvage radiotherapy in conjunction with ADT. His last Lupron shot was on 10/1/2021. He was seen in follow-up with his medical oncologist, Dr. Rivera, 1/3/2022 at which time they discussed stopping ADT until the testosterone starts to rise again.    In   he reported mild intermittent hematuria. He established care with Dr. Owens and underwent cystoscopy on 3/10/2022, with  "findings consistent with radiation cystitis.     On interview today, the patient states that he is well.  He has leakage and wears a pad. This is worse than 6 months ago. He used to wear one thin pad a day. Now he wears a thick pad. If he walks or does physical work he leaks more. He saw Urology PA Que Ferguson on 3/27/24 who discussed various options including pelvic floor rehab, clamping and artificial urinary sphincter.     His libido is recovering. He cannot achieve an erection related to surgery, and he tried to pump and was not . satisfied with that.  Saw urology for injections in August.       OBJECTIVE:   193.5 lb  5'10\"    Vitals:  No vitals were obtained today due to virtual visit.    Physical Exam   GENERAL: Healthy, alert and no distress  Breathing comfortably on room air    PSYCH: Mentation appears normal, affect normal/bright, judgement and insight intact, normal speech and a  6/18/24   < 0.01                    267   12/7/23   <0.01                                            6/7/2023 <0.01                                   373   12/16/2022 <0.01          Testosterone 324   07/07/2022 <0.01   12/23/2021 <0.01   10/01/2021 Lupron 45mg   05/03/2021 <0.01   04/02/2021 Lupron 45 mg   11/13/2020 <0.01   09/16/2020 Lupron 45 mg   05/08/2020 <0.01   03/18/2020 Lupron 45 mg   03/04/2020 [facility] Radiotherapy   12/17/2019 Lupron 22.5 mg started   12/17/2020 (pre-RT) 0.34   09/30/2019 0.2   08/29/2019 0.2   05/20/2019 0.1   02/14/2019 0.1   08/14/2018 <0.1   02/12/2018 <0.1   11/14/2017 (post-op) <0.1   08/07/2017 RARP   05/12/2017 2.7   06/10/2015 1.99   09/26/2014 1.64      IMPRESSION: PSA remains undetectable. No clinical evidence for recurrent disease at this time.   Worsening incontinence.     RECOMMENDATIONS:    Repeat PSA in 6 months (with testosterone per his request)  Referral to PT for pelvic floor rehab.   Keep appt with Urology next week.          CINDY Pearson  Department of Radiation " Oncology  [facility]    Telephone visit lasted 20 minutes 11:00 to 11:20 AM

## 2024-06-26 NOTE — PROGRESS NOTES
Visit conducted via real-time audio/video technology by Que Ferguson PA-C to the patient in their home. Patient consented to this billed visit that was started at 1:31 PM and completed at 1:54 PM.    Subjective      REASON FOR VISIT  Stress incontinence and erectile dysfunction follow-up     HISTORY OF PRESENT ILLNESS  Mr. Stallworth is a 68 year old male who I am speaking with today in follow-up for his recently worsening post-prostatectomy stress incontinence and erectile dysfunction.  I last saw Zander on 3/27/2024, at which time he had been endorsing worsening stress incontinence, and also was unsure whether or not he wanted to pursue [facility] for erectile dysfunction.  After presenting him with multiple options, discussed having him antolin over his options and following up again 3 months later, sooner if he made a decision and [facility].  We have not heard from him since that time.    Today:  Recently got a referral to pelvic floor physical therapy from his radiation oncology team   States he does not want the condom catheter or the penile clamp   Still uses a heavy pad or a depends, utilizes 2-3 based on activity level   Concerned regarding leakage during arousal and sexual activity    Objective      PHYSICAL EXAMINATION  Deferred given virtual visit.    Assessment & Plan    Irritative lower urinary tract symptoms   Radiation cystitis   Erectile dysfunction  Post-prostatectomy stress incontinence      It was my pleasure to speak with Zander in follow-up for his history of post-prostatectomy stress incontinence worsened by radiation cystitis as well as his post prostatectomy erectile dysfunction.  After reviewing his clinical history, Zander spent a lot of time with me reviewing his stress incontinence intervention history, and ultimately that he is most interested at this point in pursuing a repeat visit with the physical therapy department to see if there is any improvement that could be done as he did  historically see improvement shortly after surgery.  I believe this is very reasonable, and something I would like him to pursue then update me on through [facility].  I reiterated that the surgery would always be open to him, and may be the most effective form of management, but obviously should not be pursued lately.    In regards to his erectile dysfunction, he is interested in trialing the Trimix No. 8, and he confirms that he still feels comfortable injecting himself.  I have sent a prescription for 2.5 mL vial of Trimix to the compounding pharmacy, and he will update me on efficacy of this as well.    Ultimately, I will plan to see him back in 1 year, but sooner if need be. Mr. Stallworth expressed understanding and agreement to the above discussion and plan and all of his questions were answered to his satisfaction.     PLAN  Trial of Trimix #8 for post-prostatectomy erectile dysfunction   Pelvic floor physical therapy for post-prostatectomy stress incontinence   MyChart message from Zander if we need to pursue sooner discussion of any of these things  Follow-up visit with me in 1 year    SIGNED    Que Ferguson PA-C      I spent a total of 25 minutes spent on the date of the encounter doing chart review, history and exam, documentation, and further activities as noted above.

## 2024-06-27 ENCOUNTER — TELEPHONE (OUTPATIENT)
Dept: UROLOGY | Facility: CLINIC | Age: 69
End: 2024-06-27

## 2024-06-27 ENCOUNTER — VIRTUAL VISIT (OUTPATIENT)
Dept: UROLOGY | Facility: CLINIC | Age: 69
End: 2024-06-27
Payer: COMMERCIAL

## 2024-06-27 DIAGNOSIS — N52.35 ERECTILE DYSFUNCTION FOLLOWING RADIATION THERAPY: Primary | ICD-10-CM

## 2024-06-27 DIAGNOSIS — N99.89 STRESS INCONTINENCE AFTER SURGICAL PROCEDURE: ICD-10-CM

## 2024-06-27 DIAGNOSIS — N39.3 STRESS INCONTINENCE AFTER SURGICAL PROCEDURE: ICD-10-CM

## 2024-06-27 PROCEDURE — 99214 OFFICE O/P EST MOD 30 MIN: CPT | Mod: 95 | Performed by: STUDENT IN AN ORGANIZED HEALTH CARE EDUCATION/TRAINING PROGRAM

## 2024-06-27 NOTE — LETTER
6/27/2024       RE: Zander Stallworth  [address]  [facility] [id number]     Dear Colleague,    Thank you for referring your patient, Zander Stallworth, to the [facility] UROLOGY CLINIC [place] at [facility]. Please see a copy of my visit note below.    Visit conducted via real-time audio/video technology by Que Ferguson PA-C to the patient in their home. Patient consented to this billed visit that was started at 1:31 PM and completed at 1:54 PM.    Subjective     REASON FOR VISIT  Stress incontinence and erectile dysfunction follow-up     HISTORY OF PRESENT ILLNESS  Mr. Stallworth is a 68 year old male who I am speaking with today in follow-up for his recently worsening post-prostatectomy stress incontinence and erectile dysfunction.  I last saw Zander on 3/27/2024, at which time he had been endorsing worsening stress incontinence, and also was unsure whether or not he wanted to pursue ICI for erectile dysfunction.  After presenting him with multiple options, discussed having him antolin over his options and following up again 3 months later, sooner if he made a decision and reach out over MyChart.  We have not heard from him since that time.    Today:  Recently got a referral to pelvic floor physical therapy from his radiation oncology team   States he does not want the condom catheter or the penile clamp   Still uses a heavy pad or a depends, utilizes 2-3 based on activity level   Concerned regarding leakage during arousal and sexual activity    Objective     PHYSICAL EXAMINATION  Deferred given virtual visit.    Assessment & Plan   Irritative lower urinary tract symptoms   Radiation cystitis   Erectile dysfunction  Post-prostatectomy stress incontinence      It was my pleasure to speak with Zander in follow-up for his history of post-prostatectomy stress incontinence worsened by radiation cystitis as well as his post prostatectomy erectile dysfunction.  After  reviewing his clinical history, Zander spent a lot of time with me reviewing his stress incontinence intervention history, and ultimately that he is most interested at this point in pursuing a repeat visit with the physical therapy department to see if there is any improvement that could be done as he did historically see improvement shortly after surgery.  I believe this is very reasonable, and something I would like him to pursue then update me on through [facility].  I reiterated that the surgery would always be open to him, and may be the most effective form of management, but obviously should not be pursued lately.    In regards to his erectile dysfunction, he is interested in trialing the Trimix No. 8, and he confirms that he still feels comfortable injecting himself.  I have sent a prescription for 2.5 mL vial of Trimix to the compounding pharmacy, and he will update me on efficacy of this as well.    Ultimately, I will plan to see him back in 1 year, but sooner if need be. Mr. Stallworth expressed understanding and agreement to the above discussion and plan and all of his questions were answered to his satisfaction.     PLAN  Trial of Trimix #8 for post-prostatectomy erectile dysfunction   Pelvic floor physical therapy for post-prostatectomy stress incontinence   [facility] message from Zander if we need to pursue sooner discussion of any of these things  Follow-up visit with me in 1 year    SIGNED    Que Ferguson PA-C      I spent a total of 25 minutes spent on the date of the encounter doing chart review, history and exam, documentation, and further activities as noted above.

## 2024-06-27 NOTE — TELEPHONE ENCOUNTER
Patient confirmed scheduled appointment:  Date: June 27th   Time: 11:15am  Visit type: Return   Provider: Cesar Ferguson  Location: [facility]  Testing/imaging: N/A  Additional notes: Per check out - Follow-up virtual visit with Que Ferguson PA-C in one year.

## 2024-06-27 NOTE — NURSING NOTE
Is the patient currently in the state of [place]? YES    Visit mode:VIDEO    If the visit is dropped, the patient can be reconnected by: VIDEO VISIT: Text to cell phone:   Telephone Information:   Mobile [phone]       Will anyone else be joining the visit? NO  (If patient encounters technical issues they should call [phone]    How would you like to obtain your AVS? MyChart    Are changes needed to the allergy or medication list? No, Pt stated no changes to allergies, and Pt stated no med changes    Are refills needed on medications prescribed by this physician? NO    Reason for visit: BYRON CONTRERAS

## 2024-07-01 ENCOUNTER — THERAPY VISIT (OUTPATIENT)
Dept: PHYSICAL THERAPY | Facility: CLINIC | Age: 69
End: 2024-07-01
Attending: PHYSICIAN ASSISTANT
Payer: COMMERCIAL

## 2024-07-01 DIAGNOSIS — C61 PROSTATE CANCER (H): ICD-10-CM

## 2024-07-01 PROCEDURE — 97162 PT EVAL MOD COMPLEX 30 MIN: CPT | Mod: GP

## 2024-07-01 PROCEDURE — 97112 NEUROMUSCULAR REEDUCATION: CPT | Mod: GP

## 2024-07-01 PROCEDURE — 97110 THERAPEUTIC EXERCISES: CPT | Mod: GP

## 2024-07-01 PROCEDURE — 97530 THERAPEUTIC ACTIVITIES: CPT | Mod: GP

## 2024-07-01 NOTE — PROGRESS NOTES
PHYSICAL THERAPY EVALUATION  Type of Visit: Evaluation       Fall Risk Screen:  Fall screen completed by: PT  Have you fallen 2 or more times in the past year?: No  Have you fallen and had an injury in the past year?: No  Is patient a fall risk?: No    Subjective       Presenting condition or subjective complaint: I have had to start wearing a heavy pad to manage my leaking  Date of onset: 07/01/24 (Chronic condition increased in last 6 months)    Relevant medical history: Cancer; Diabetes; Incontinence   Dates & types of surgery: See my [facility] chart    Prior diagnostic imaging/testing results:       Prior therapy history for the same diagnosis, illness or injury: Yes Same    Living Environment  Social support: With a significant other or spouse   Type of home: House; 2-story   Stairs to enter the home: No       Ramp: No   Stairs inside the home: Yes 15 Is there a railing: Yes     Help at home: None  Equipment owned:       Employment: No    Hobbies/Interests: Rv camping    Patient goals for therapy: Wear a thin pad and be able to exercise more without concern of soaking through my pad    Pain assessment: See objective evaluation for additional pain details     Objective      PELVIC EVALUATION  ADDITIONAL HISTORY:  Sex assigned at birth: Male  Gender identity: Male    Pronouns: He/Him/His      Bladder History: - sitting to urinate.   Feels bladder filling: Yes  Triggers for feeling of inability to wait to go to the bathroom: No    How long can you wait to urinate: It depends, I typically can wait 10 mins. Occ significant that having to rush to drop pants and sit he will leak.   Gets up at night to urinate: Yes 1  Can stop the flow of urine when urinating: No  Volume of urine usually released: Average   Other issues: Dribbling after urinating, occ blood in urine due to post radiation cystitis.   - Tried decaf tea and no alcohol for a month without any significant change.   Number of bladder infections in last 12  "months:    Fluid intake per day: 64 oz 16 oz of coffee 360 ml (typically wine, occ fizzy water)  Medications taken for bladder: No     Activities causing urine leak: Cough; Hurrying to the bathroom due to a strong urge to urinate (pee)    Amount of urine typically leaked: Unknown - larger amount noticed within the last 6 months. Previously able to use a light overnight and into the following day. Occ with walking leak enough to soak through pad into underwear and need to change or empty bladder after 30-45 min. More recently he has had to change a pad multiple times in an hour walk (lite pad). He has also been soaking through at night.   - Often not getting any urge feelings but \"sneaks up on me\". Such as when walking he feels the wetness rather than the leaking.   Pads used to help with leaking: Yes I use this many pads per day: 2-4 depending upon activities   I use this type/brand: Depends maximum guards pads during the day, full Depends pants at night  - Changing when \"wet enough\" but maybe not fully saturated.     Bowel History:  Frequency of bowel movement: Daily  Consistency of stool: Soft-formed    Ignores the urge to defecate: Sometimes  Other bowel issues: Loss of gas  Length of time spent trying to have a bowel movement:      Sexual Function History:  Sexual orientation: Straight    Sexually active: No  Lubrication used: No No  Pelvic pain:      Pain or difficulty with orgasms/erection/ejaculation: Yes I can t have an erection.   State of menopause:    Hormone medications: No      Are you currently pregnant: No  Have you tried pelvic floor strengthening exercises for 4 weeks: No  Do you have any history of trauma that is relevant to your care that you d like to share: No      Discussed reason for referral regarding pelvic health needs and external/internal pelvic floor muscle examination with patient/guardian.  Opportunity provided to ask questions and verbal consent for assessment and intervention was " "given.  POSTURE: WNL  LUMBAR SCREEN:  slight reduced motion in all directions due to tightness.   HIP SCREEN:  Strength: WNL       PELVIC/SI SCREEN:  Gillet (March): slight reduced motion B.     PAIN PROVOCATION TEST: WNL  PELVIS/SI SPECIAL TESTS: WNL  BREATHING SYMMETRY: Decreased rib cage mobility    PELVIC EXAM  External Visual Inspection:  At rest: Elevated perineal body  With voluntary pelvic floor contraction: Present  Relaxation of PFM: Partial/delayed relaxation  With intra-abdominal pressure: Valsalva: No change    Integumentary:   Anal: Skin irritation      Internal Digital Palpation:  Per Rectum: high resting tone throughout levator ani and OI.   Tenderness  Myofascial Resistance to Palpation: Firm, Taut  Digital Muscle Performance: P (Power): 2/5  E (Endurance): 8 seconds  Compensations: Adductors, Breath holding  Relaxation Post-Contraction: Partial/delayed relaxation, no ability to lengthen muscles with valsalva.     ABDOMINAL ASSESSMENT  Diastasis Rectus Abdominis (DARIAN):  DARIAN presence: Yes  Location above and at umbilicus. 4 finger width, 6\" long and protruding roughly 1\". Soft without pain.     Abdominal Activation/Strength: SLR: poor load transfer through core with prominent DARIAN.     Scar:   Location/Type: at umbilicus and multiple port sites.   Mobility: WNL    Fascial Tension/Restriction: WNL    Assessment & Plan   CLINICAL IMPRESSIONS  Medical Diagnosis: prostate cancer    Treatment Diagnosis: prostate cancer   Impression/Assessment: Patient is a 68 year old male with urinary leakage and erectile dysfunction complaints.  The following significant findings have been identified: Decreased proprioception, Impaired sensation, Impaired muscle performance, and Decreased activity tolerance. These impairments interfere with their ability to perform self care tasks, recreational activities, household chores, driving , household mobility, and community mobility as compared to previous level of function. "     Clinical Decision Making (Complexity):  Clinical Presentation: Unstable/Unpredictable   Clinical Presentation Rationale: based on medical and personal factors listed in PT evaluation  Clinical Decision Making (Complexity): Moderate complexity    PLAN OF CARE  Treatment Interventions:  Modalities: Biofeedback, E-stim  Interventions: Gait Training, Manual Therapy, Neuromuscular Re-education, Therapeutic Activity, Therapeutic Exercise, Self-Care/Home Management    Long Term Goals     PT Goal 1  Goal Identifier: LTG 1 - Pad usage  Goal Description: Pt will decrease pad usage from 4 heavy pads per day to 1 lite pad per day in order to improve continence throughout the day.  Rationale: to maximize safety and independence with performance of ADLs and functional tasks;to maximize safety and independence with self cares  Target Date: 09/23/24  PT Goal 2  Goal Identifier: LTG 2 - Leakage with activity  Goal Description: Pt will report minimal leakage with 1 hour walk in order to improve otlerance to activity.  Rationale: to maximize safety and independence with performance of ADLs and functional tasks;to maximize safety and independence within the home;to maximize safety and independence within the community  Target Date: 09/23/24      Frequency of Treatment: 1x EOW  Duration of Treatment: 12 weeks    Education Assessment:   Learner/Method: Patient;No Barriers to Learning    Risks and benefits of evaluation/treatment have been explained.   Patient/Family/caregiver agrees with Plan of Care.     Evaluation Time:        Signing Clinician: Sandra Lomax, PT        [facility]                                                                                   OUTPATIENT PHYSICAL THERAPY      PLAN OF TREATMENT FOR OUTPATIENT REHABILITATION   Patient's Last Name, First Name, Zander Radford    YOB: 1955   Provider's Name   [facility]   Medical  Record No.  [id number]     Onset Date: 07/01/24 (Chronic condition increased in last 6 months)  Start of Care Date: 07/01/24     Medical Diagnosis:  prostate cancer      PT Treatment Diagnosis:  prostate cancer Plan of Treatment  Frequency/Duration: 1x EOW/ 12 weeks    Certification date from 07/01/24 to 09/23/24         See note for plan of treatment details and functional goals     Sandra Lomax, PT                         I CERTIFY THE NEED FOR THESE SERVICES FURNISHED UNDER        THIS PLAN OF TREATMENT AND WHILE UNDER MY CARE     (Physician attestation of this document indicates review and certification of the therapy plan).              Referring Provider:  Melba Ceballos    Initial Assessment  See Epic Evaluation- Start of Care Date: 07/01/24

## 2024-07-19 ENCOUNTER — THERAPY VISIT (OUTPATIENT)
Dept: PHYSICAL THERAPY | Facility: CLINIC | Age: 69
End: 2024-07-19
Payer: COMMERCIAL

## 2024-07-19 DIAGNOSIS — C61 PROSTATE CANCER (H): Primary | ICD-10-CM

## 2024-07-19 PROCEDURE — 97530 THERAPEUTIC ACTIVITIES: CPT | Mod: GP

## 2024-07-19 PROCEDURE — 97112 NEUROMUSCULAR REEDUCATION: CPT | Mod: GP

## 2024-07-19 PROCEDURE — 97110 THERAPEUTIC EXERCISES: CPT | Mod: GP

## 2024-08-01 ENCOUNTER — THERAPY VISIT (OUTPATIENT)
Dept: PHYSICAL THERAPY | Facility: CLINIC | Age: 69
End: 2024-08-01
Payer: COMMERCIAL

## 2024-08-01 DIAGNOSIS — C61 PROSTATE CANCER (H): Primary | ICD-10-CM

## 2024-08-01 PROCEDURE — 97112 NEUROMUSCULAR REEDUCATION: CPT | Mod: GP

## 2024-08-01 PROCEDURE — 97110 THERAPEUTIC EXERCISES: CPT | Mod: GP

## 2024-08-04 ENCOUNTER — HEALTH MAINTENANCE LETTER (OUTPATIENT)
Age: 69
End: 2024-08-04

## 2024-08-15 ENCOUNTER — THERAPY VISIT (OUTPATIENT)
Dept: PHYSICAL THERAPY | Facility: CLINIC | Age: 69
End: 2024-08-15
Payer: COMMERCIAL

## 2024-08-15 DIAGNOSIS — C61 PROSTATE CANCER (H): Primary | ICD-10-CM

## 2024-08-15 PROCEDURE — 97112 NEUROMUSCULAR REEDUCATION: CPT | Mod: GP

## 2024-08-15 PROCEDURE — 97110 THERAPEUTIC EXERCISES: CPT | Mod: GP

## 2024-08-29 ENCOUNTER — THERAPY VISIT (OUTPATIENT)
Dept: PHYSICAL THERAPY | Facility: CLINIC | Age: 69
End: 2024-08-29
Payer: COMMERCIAL

## 2024-08-29 DIAGNOSIS — C61 PROSTATE CANCER (H): Primary | ICD-10-CM

## 2024-08-29 PROCEDURE — 97110 THERAPEUTIC EXERCISES: CPT | Mod: GP

## 2024-08-29 PROCEDURE — 97530 THERAPEUTIC ACTIVITIES: CPT | Mod: GP

## 2024-08-29 PROCEDURE — 97112 NEUROMUSCULAR REEDUCATION: CPT | Mod: GP

## 2024-09-12 ENCOUNTER — THERAPY VISIT (OUTPATIENT)
Dept: PHYSICAL THERAPY | Facility: CLINIC | Age: 69
End: 2024-09-12
Payer: COMMERCIAL

## 2024-09-12 DIAGNOSIS — C61 PROSTATE CANCER (H): Primary | ICD-10-CM

## 2024-09-12 PROCEDURE — 97112 NEUROMUSCULAR REEDUCATION: CPT | Mod: GP

## 2024-09-12 PROCEDURE — 97110 THERAPEUTIC EXERCISES: CPT | Mod: GP

## 2024-09-26 ENCOUNTER — THERAPY VISIT (OUTPATIENT)
Dept: PHYSICAL THERAPY | Facility: CLINIC | Age: 69
End: 2024-09-26
Payer: COMMERCIAL

## 2024-09-26 DIAGNOSIS — C61 PROSTATE CANCER (H): Primary | ICD-10-CM

## 2024-09-26 PROCEDURE — 97530 THERAPEUTIC ACTIVITIES: CPT | Mod: GP

## 2024-09-26 PROCEDURE — 97110 THERAPEUTIC EXERCISES: CPT | Mod: GP

## 2024-09-26 PROCEDURE — 97112 NEUROMUSCULAR REEDUCATION: CPT | Mod: GP

## 2024-10-09 ENCOUNTER — APPOINTMENT (OUTPATIENT)
Dept: URGENT CARE | Facility: PHYSICIAN GROUP | Age: 69
End: 2024-10-09
Payer: COMMERCIAL

## 2024-12-21 ENCOUNTER — HEALTH MAINTENANCE LETTER (OUTPATIENT)
Age: 69
End: 2024-12-21

## 2024-12-23 ENCOUNTER — LAB (OUTPATIENT)
Dept: LAB | Facility: CLINIC | Age: 69
End: 2024-12-23
Payer: COMMERCIAL

## 2024-12-23 DIAGNOSIS — C61 PROSTATE CANCER (H): ICD-10-CM

## 2024-12-23 LAB — PSA SERPL DL<=0.01 NG/ML-MCNC: <0.01 NG/ML (ref 0–6.5)

## 2024-12-23 PROCEDURE — 36415 COLL VENOUS BLD VENIPUNCTURE: CPT | Performed by: PATHOLOGY

## 2024-12-23 PROCEDURE — 84153 ASSAY OF PSA TOTAL: CPT | Performed by: PATHOLOGY

## 2024-12-31 ENCOUNTER — VIRTUAL VISIT (OUTPATIENT)
Dept: RADIATION THERAPY | Facility: OUTPATIENT CENTER | Age: 69
End: 2024-12-31
Payer: COMMERCIAL

## 2024-12-31 DIAGNOSIS — C61 PROSTATE CANCER (H): Primary | ICD-10-CM

## 2024-12-31 NOTE — LETTER
2024      Zander Stallworth  [address]  [facility] [address]      Dear Colleague,    Thank you for referring your patient, Zander Stallworth, to the [facility] RADIATION THERAPY CLINIC. Please see a copy of my visit note below.          [facility], [place]  Radiation Oncology Follow-up Note  24      Zander Stallworth   MRN: [id number]  : 1955     Radiation Oncologist: Doug Schreiber MD  Provider: CINDY Pearson  Last clinic visit: 24    Telephone visit pt in [place], provider in clinic      DISEASE TREATED: Recurrent high risk prostate cancer, high-grade neuroendocrine differentiation. [id number], [place] 3+4=7, PSA=0.2.  Axumin PET/CT scan for LN+     INTERVAL SINCE COMPLETION OF RADIATION THERAPY: 4 years 9 mo (completed on 3/4/2020)     TYPE OF RADIATION THERAPY ADMINISTERED: 7000 cGy in 35 fractions (4600cGy to LN + Boost)       HORMONAL THERAPY: Plan Indefinite. Lupron 22.5 mg given 2019, 45 mg given 3/18/2020, 2020, 2021, 10/1/2021)     SUBJECTIVE: Mr. Stallworth is a 68 year old man with biochemically and nodally recurrent prostate cancer. His pre-op PSA was 2.7, with highest [place]'s score of 5+3=8. He underwent upfront treatment with RALP and BLND in 2017. There was no evidence of EPE SVI, federica involvement (0/3); and negative margins were achieved.      His post-op PSA was initially undetectable. It mariano to a value of 0.1 on 2/15/2019, and increased to 0.2 on 2019. Axumin PET/CT on 2019 then showed FDG avidity in one right common iliac node and an right internal iliac node. He underwent repeat PLND on 2019, with pathology revealing high grade neuroendocrine tumor in 2/5 right common iliac nodes dissected. This was deemed to be recurrence of his prostate cancer, and he proceeded to undergo salvage radiotherapy in conjunction with ADT. His last Lupron shot was on 10/1/2021. He was seen in follow-up with his medical oncologist,   "Miguel, 1/3/2022 at which time they discussed stopping ADT until the testosterone starts to rise again.    In 2022  he reported mild intermittent hematuria. He established care with Dr. Owens and underwent cystoscopy on 3/10/2022, with findings consistent with radiation cystitis.     On interview today, the patient states that he is well.  He has leakage and wears a pad. This is worse than 6 months ago. He used to wear one thin pad a day. Now he wears a thick pad. If he walks or does physical work he leaks more. He saw Urology PA Que Ferguson on 3/27/24 who discussed various options including pelvic floor rehab, clamping and artificial urinary sphincter. Did PT. This helped a lot and is wearing a thin pad now. '    No blood in urine or stool since we talked last.     His libido is recovering. He cannot achieve an erection related to surgery, and he tried to pump and was not . satisfied with that.  Saw urology for injections in August.       OBJECTIVE:   193.5 lb  5'10\"    Vitals:  No vitals were obtained today due to virtual visit.    Physical Exam   GENERAL: Healthy, alert and no distress  Breathing comfortably on room air  PSYCH: Mentation appears normal, affect normal/bright, judgement and insight intact, normal speech and affect      12/23/24           < 0.01     6/18/24           < 0.01                    267   12/7/23   <0.01                                            6/7/2023 <0.01                                   373   12/16/2022 <0.01          Testosterone 324   07/07/2022 <0.01   12/23/2021 <0.01   10/01/2021 Lupron 45mg   05/03/2021 <0.01   04/02/2021 Lupron 45 mg   11/13/2020 <0.01   09/16/2020 Lupron 45 mg   05/08/2020 <0.01   03/18/2020 Lupron 45 mg   03/04/2020 [facility]   12/17/2019 Lupron 22.5 mg started   12/17/2020 (pre-RT) 0.34   09/30/2019 0.2   08/29/2019 0.2   05/20/2019 0.1   02/14/2019 0.1   08/14/2018 <0.1   02/12/2018 <0.1   11/14/2017 (post-op) <0.1   08/07/2017 RARP "   05/12/2017 2.7   06/10/2015 1.99   09/26/2014 1.64      IMPRESSION: PSA remains undetectable. No clinical evidence for recurrent disease at this time.   Worsening incontinence.     RECOMMENDATIONS:    Repeat PSA in 6 months with T per his preference.   Keep appt with Urology.          CINDY Pearson  Department of Radiation Oncology  [facility] [place]    Telephone visit lasted 10 minutes, 10:00 to 10: 10 am        Again, thank you for allowing me to participate in the care of your patient.        Sincerely,        Melba Ceballos PA-C    Electronically signed

## 2024-12-31 NOTE — PROGRESS NOTES
[facility], [place]  Radiation Oncology Follow-up Note  24      Zander Stallworth   MRN: [id number]  : 1955     Radiation Oncologist: Doug Schreiber MD  Provider: CINDY Pearson  Last clinic visit: 24    Telephone visit [place] in [place], provider in clinic      DISEASE TREATED: Recurrent high risk prostate cancer, high-grade neuroendocrine differentiation. [id number], [place] 3+4=7, PSA=0.2.  Axumin PET/CT scan for LN+     INTERVAL SINCE COMPLETION OF RADIATION THERAPY: 4 years 9 mo (completed on 3/4/2020)     TYPE OF RADIATION THERAPY ADMINISTERED: 7000 cGy in 35 fractions (4600cGy to LN + Boost)       HORMONAL THERAPY: Plan Indefinite. Lupron 22.5 mg given 2019, 45 mg given 3/18/2020, 2020, 2021, 10/1/2021)     SUBJECTIVE: Mr. Stallworth is a 68 year old man with biochemically and nodally recurrent prostate cancer. His pre-op PSA was 2.7, with highest [place]'s score of 5+3=8. He underwent upfront treatment with RALP and BLND in 2017. There was no evidence of EPE SVI, federica involvement (0/3); and negative margins were achieved.      His post-op PSA was initially undetectable. It mariano to a value of 0.1 on 2/15/2019, and increased to 0.2 on 2019. Axumin PET/CT on 2019 then showed FDG avidity in one right common iliac node and an right internal iliac node. He underwent repeat PLND on 2019, with pathology revealing high grade neuroendocrine tumor in 2/5 right common iliac nodes dissected. This was deemed to be recurrence of his prostate cancer, and he proceeded to undergo salvage radiotherapy in conjunction with ADT. His last Lupron shot was on 10/1/2021. He was seen in follow-up with his medical oncologist, Dr. Rivera, 1/3/2022 at which time they discussed stopping ADT until the testosterone starts to rise again.    In   he reported mild intermittent hematuria. He established care with Dr. Owens and underwent cystoscopy on 3/10/2022, with  "findings consistent with radiation cystitis.     On interview today, the patient states that he is well.  He has leakage and wears a pad. This is worse than 6 months ago. He used to wear one thin pad a day. Now he wears a thick pad. If he walks or does physical work he leaks more. He saw Urology PA Que Ferguson on 3/27/24 who discussed various options including pelvic floor rehab, clamping and artificial urinary sphincter. Did PT. This helped a lot and is wearing a thin pad now. '    No blood in urine or stool since we talked last.     His libido is recovering. He cannot achieve an erection related to surgery, and he tried to pump and was not . satisfied with that.  Saw urology for injections in August.       OBJECTIVE:   193.5 lb  5'10\"    Vitals:  No vitals were obtained today due to virtual visit.    Physical Exam   GENERAL: Healthy, alert and no distress  Breathing comfortably on room air  PSYCH: Mentation appears normal, affect normal/bright, judgement and insight intact, normal speech and affect      12/23/24           < 0.01     6/18/24           < 0.01                    267   12/7/23   <0.01                                            6/7/2023 <0.01                                   373   12/16/2022 <0.01          Testosterone 324   07/07/2022 <0.01   12/23/2021 <0.01   10/01/2021 Lupron 45mg   05/03/2021 <0.01   04/02/2021 Lupron 45 mg   11/13/2020 <0.01   09/16/2020 Lupron 45 mg   05/08/2020 <0.01   03/18/2020 Lupron 45 mg   03/04/2020 [facility]   12/17/2019 Lupron 22.5 mg started   12/17/2020 (pre-RT) 0.34   09/30/2019 0.2   08/29/2019 0.2   05/20/2019 0.1   02/14/2019 0.1   08/14/2018 <0.1   02/12/2018 <0.1   11/14/2017 (post-op) <0.1   08/07/2017 RARP   05/12/2017 2.7   06/10/2015 1.99   09/26/2014 1.64      IMPRESSION: PSA remains undetectable. No clinical evidence for recurrent disease at this time.   Worsening incontinence.     RECOMMENDATIONS:    Repeat PSA in 6 months with T per his " preference.   Keep appt with Urology.          CINDY Pearson  Department of Radiation Oncology  [facility]    Telephone visit lasted 10 minutes, 10:00 to 10: 10 am

## 2025-03-22 ENCOUNTER — HEALTH MAINTENANCE LETTER (OUTPATIENT)
Age: 70
End: 2025-03-22

## 2025-04-23 ENCOUNTER — TELEPHONE (OUTPATIENT)
Dept: UROLOGY | Facility: CLINIC | Age: 70
End: 2025-04-23
Payer: COMMERCIAL

## 2025-04-23 NOTE — TELEPHONE ENCOUNTER
You have an appointment currently scheduled with Cesar Ferguson on 6/27.  Due to changes to the providers schedule we need to reschedule your appointment.  Your appt is rescheduled to 6/26 at 11:00.     Please use your MyChart or call [phone] to reschedule this appointment (if needed)at your earliest convenience.      We apologize for any inconvenience this may cause.      We look forward to seeing you at your upcoming appointment and thank you for choosing [facility].     Thank you for trusting us with your care.      Sincerely, [facility] at your earliest convenience.         No

## 2025-06-04 ENCOUNTER — PRE VISIT (OUTPATIENT)
Dept: UROLOGY | Facility: CLINIC | Age: 70
End: 2025-06-04
Payer: COMMERCIAL

## 2025-06-04 NOTE — TELEPHONE ENCOUNTER
Reason for visit: Return     Dx/Hx/Sx: ED s/p; prostatectomy,    Records/imaging/labs/orders: PFPT and med check    At Rooming: Everton Jacinto Luis  6/4/2025  3:09 PM

## 2025-06-19 NOTE — PROGRESS NOTES
Virtual Visit Details    Type of service:  Video Visit   Video Start Time: 11:03 AM  Video End Time:11:18 AM    Originating Location (pt. Location): Home    Distant Location (provider location):  Off-site  Platform used for Video Visit: Delfin     Subjective      REASON FOR VISIT  Stress incontinence and erectile dysfunction follow-up     HISTORY OF PRESENT ILLNESS  Mr. Stallworth is a 69 year old male who I am speaking with today in follow-up for his recently worsening post-prostatectomy stress incontinence and erectile dysfunction.  He is currently utilizing Trimix No. 8 for his erections and has been following with pelvic floor physical therapy for his ongoing stress incontinence.  Presents today for annual medication refill visit.    Today:  Post-prostatectomy stress incontinence stress incontinence much better after pelvic floor physical therapy  Some urinary urgency and dribbling, but feels manageable   Endorses some new gross hematuria recently  For the last month, every few days   Cystoscopy from 3/2022 - only significant finding is radiation cystitis   Tried Trimix #8 up to 0.5 mL without any improvement in erections really   Stopped trying for a while but interested in trying again    Objective      PHYSICAL EXAMINATION  Deferred given virtual visit.    Assessment & Plan    Irritative lower urinary tract symptoms   Radiation cystitis   Erectile dysfunction  Post-prostatectomy stress incontinence    Gross hematuria     It was my pleasure to speak with Zander in follow-up for his history of post-prostatectomy stress incontinence worsened by radiation cystitis as well as his post prostatectomy erectile dysfunction.  After reviewing his clinical history, I am glad to hear that his stress incontinence is so significantly improved after meeting with the physical therapy team.  He has the exercises he know he needs to do that he can continue to work on should symptoms backslide.    In regards to his post-prostatectomy  erectile dysfunction, I am sorry to hear that he did not see the improvements he was hoping for with the intracavernosal injections, but I am happy to represcribe the medication so he can try at a different dose.  I will have him reach out to me over MyChart with the efficacy of this as we still have other formulations we can try.    If nothing else, I will plan to see him back in 1 year virtually.    Mr. Stallworth expressed understanding and agreement to the above discussion and plan and all of his questions were answered to his satisfaction.     PLAN  Prescription for Trimix No. 8 resent to the [facility] pharmacy  Follow-up visit with me in 1 year    SIGNED    Que Ferguson PA-C      I spent a total of 18 minutes spent on the date of the encounter doing chart review, history and exam, documentation, and further activities as noted above.

## 2025-06-26 ENCOUNTER — VIRTUAL VISIT (OUTPATIENT)
Dept: UROLOGY | Facility: CLINIC | Age: 70
End: 2025-06-26
Payer: COMMERCIAL

## 2025-06-26 DIAGNOSIS — N52.35 ERECTILE DYSFUNCTION FOLLOWING RADIATION THERAPY: ICD-10-CM

## 2025-06-26 DIAGNOSIS — R31.0 GROSS HEMATURIA: Primary | ICD-10-CM

## 2025-06-26 NOTE — LETTER
6/26/2025       RE: Zander Stallworth  [address]  [facility] [address]     Dear Colleague,    Thank you for referring your patient, Zander Stallworth, to the [facility] UROLOGY [facility]IN[facility] [place] at [facility]. Please see a copy of my visit note below.    Virtual Visit Details    Type of service:  Video Visit   Video Start Time: 11:03 AM  Video End Time:11:18 AM    Originating Location (pt. Location): Home    Distant Location (provider location):  Off-site  Platform used for Video Visit: Delfin     Subjective     REASON FOR VISIT  Stress incontinence and erectile dysfunction follow-up     HISTORY OF PRESENT ILLNESS  Mr. Stallworth is a 69 year old male who I am speaking with today in follow-up for his recently worsening post-prostatectomy stress incontinence and erectile dysfunction.  He is currently utilizing Trimix No. 8 for his erections and has been following with pelvic floor physical therapy for his ongoing stress incontinence.  Presents today for annual medication refill visit.    Today:  Post-prostatectomy stress incontinence stress incontinence much better after pelvic floor physical therapy  Some urinary urgency and dribbling, but feels manageable   Endorses some new gross hematuria recently  For the last month, every few days   Cystoscopy from 3/2022 - only significant finding is radiation cystitis   Tried Trimix #8 up to 0.5 mL without any improvement in erections really   Stopped trying for a while but interested in trying again    Objective     PHYSICAL EXAMINATION  Deferred given virtual visit.    Assessment & Plan   Irritative lower urinary tract symptoms   Radiation cystitis   Erectile dysfunction  Post-prostatectomy stress incontinence    Gross hematuria     It was my pleasure to speak with Zander in follow-up for his history of post-prostatectomy stress incontinence worsened by radiation cystitis as well as his post prostatectomy erectile dysfunction.   After reviewing his clinical history, I am glad to hear that his stress incontinence is so significantly improved after meeting with the physical therapy team.  He has the exercises he know he needs to do that he can continue to work on should symptoms backslide.    In regards to his post-prostatectomy erectile dysfunction, I am sorry to hear that he did not see the improvements he was hoping for with the intracavernosal injections, but I am happy to represcribe the medication so he can try at a different dose.  I will have him reach out to me over MyChart with the efficacy of this as we still have other formulations we can try.    If nothing else, I will plan to see him back in 1 year virtually.    Mr. Stallworth expressed understanding and agreement to the above discussion and plan and all of his questions were answered to his satisfaction.     PLAN  Prescription for Trimix No. 8 resent to the compounding pharmacy  Follow-up visit with me in 1 year    SIGNED    Que Ferguson PA-C      I spent a total of 18 minutes spent on the date of the encounter doing chart review, history and exam, documentation, and further activities as noted above.    Again, thank you for allowing me to participate in the care of your patient.      Sincerely,    Que Ferguson PA-C

## 2025-06-26 NOTE — NURSING NOTE
Current patient location: pt driving - will pull over for appt     Is the patient currently in the state of [place]? YES    Visit mode: VIDEO    If the visit is dropped, the patient can be reconnected by:VIDEO VISIT: Text to cell phone:   Telephone Information:   Mobile [phone]       Will anyone else be joining the visit? NO  (If patient encounters technical issues they should call [phone]    Are changes needed to the allergy or medication list? Pt stated no med changes    Are refills needed on medications prescribed by this physician? NO- discuss some medications with provider     Rooming Documentation:  Not applicable    Reason for visit: BYRON CONTRERAS

## 2025-07-01 ENCOUNTER — LAB (OUTPATIENT)
Dept: LAB | Facility: CLINIC | Age: 70
End: 2025-07-01
Payer: COMMERCIAL

## 2025-07-01 DIAGNOSIS — C61 PROSTATE CANCER (H): ICD-10-CM

## 2025-07-01 LAB — PSA SERPL DL<=0.01 NG/ML-MCNC: <0.01 NG/ML (ref 0–6.5)

## 2025-07-01 PROCEDURE — 36415 COLL VENOUS BLD VENIPUNCTURE: CPT | Performed by: PATHOLOGY

## 2025-07-01 PROCEDURE — 99000 SPECIMEN HANDLING OFFICE-LAB: CPT | Performed by: PATHOLOGY

## 2025-07-01 PROCEDURE — 84153 ASSAY OF PSA TOTAL: CPT | Performed by: PATHOLOGY

## 2025-07-01 PROCEDURE — 84403 ASSAY OF TOTAL TESTOSTERONE: CPT | Performed by: PHYSICIAN ASSISTANT

## 2025-07-03 LAB — TESTOST SERPL-MCNC: 302 NG/DL (ref 240–950)

## 2025-07-05 ENCOUNTER — HEALTH MAINTENANCE LETTER (OUTPATIENT)
Age: 70
End: 2025-07-05

## 2025-07-08 ENCOUNTER — VIRTUAL VISIT (OUTPATIENT)
Dept: RADIATION THERAPY | Facility: OUTPATIENT CENTER | Age: 70
End: 2025-07-08
Payer: COMMERCIAL

## 2025-07-08 DIAGNOSIS — C61 PROSTATE CANCER (H): Primary | ICD-10-CM

## 2025-07-08 NOTE — PROGRESS NOTES
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 [id number], Hallie 3+4=7, PSA=0.2.  Axumin PET/CT scan for LN+.     INTERVAL SINCE COMPLETION OF RADIATION THERAPY: 5 years 4 mo (completed on 3/4/2020)     TYPE OF RADIATION THERAPY ADMINISTERED: 7000 cGy in 35 fractions (4600cGy to LN + Boost)       HORMONAL THERAPY: Plan Indefinite. Lupron 22.5 mg given 2019, 45 mg given 3/18/2020, 2020, 2021, 10/1/2021)     SUBJECTIVE: Mr. Stallworth is a 69 year old man with biochemically and nodally recurrent prostate cancer. His pre-op PSA was 2.7, with highest [place]'s score of 5+3=8. He underwent upfront treatment with RALP and BLND in 2017. There was no evidence of EPE SVI, federica involvement (0/3); and negative margins were achieved.      His post-op PSA was initially undetectable. It mariano to a value of 0.1 on 2/15/2019, and increased to 0.2 on 2019. Axumin PET/CT on 2019 then showed FDG avidity in one right common iliac node and an right internal iliac node. He underwent repeat PLND on 2019, with pathology revealing high grade neuroendocrine tumor in 2/5 right common iliac nodes dissected. This was deemed to be recurrence of his prostate cancer, and he proceeded to undergo salvage radiotherapy in conjunction with ADT. His last Lupron shot was on 10/1/2021. He was seen in follow-up with his medical oncologist, Dr. Rivera, 1/3/2022 at which time they discussed stopping ADT until the testosterone starts to rise again.    In   he reported mild intermittent hematuria. He established care with Dr. Owens and underwent cystoscopy on 3/10/2022, with  "findings consistent with radiation cystitis.     Did PT for urinary leakage. This helped a lot and is wearing a thin pad now.     Since I saw him last he has recurrence of blood in urine several times per week over the last few weeks, saw Que Ferguson 6/26/25.  CT urogram ordered and scheduled later this month    His libido is recovering. He cannot achieve an erection related to surgery, and he tried to pump and was not satisfied with that.  Saw urology for injections in August.       OBJECTIVE:   193.5 lb  5'10\"    Vitals:  No vitals were obtained today due to virtual visit.    Physical Exam   GENERAL: Healthy, alert and no distress  Breathing comfortably on room air  PSYCH: Mentation appears normal, affect normal/bright, judgement and insight intact, normal speech and affect    7/1/25                      < 0.01         302    12/23/24           < 0.01     6/18/24           < 0.01                    267   12/7/23   <0.01                                            6/7/2023 <0.01                                   373   12/16/2022 <0.01          Testosterone 324   07/07/2022 <0.01   12/23/2021 <0.01   10/01/2021 Lupron 45mg   05/03/2021 <0.01   04/02/2021 Lupron 45 mg   11/13/2020 <0.01   09/16/2020 Lupron 45 mg   05/08/2020 <0.01   03/18/2020 Lupron 45 mg   03/04/2020 Salvage Radiotherapy   12/17/2019 Lupron 22.5 mg started   12/17/2020 (pre-RT) 0.34   09/30/2019 0.2   08/29/2019 0.2   05/20/2019 0.1   02/14/2019 0.1   08/14/2018 <0.1   02/12/2018 <0.1   11/14/2017 (post-op) <0.1   08/07/2017 RARP   05/12/2017 2.7   06/10/2015 1.99   09/26/2014 1.64      IMPRESSION: PSA remains undetectable. No clinical evidence for recurrent disease at this time. Gross hematuria, saw Urology PA who ordered Ct Urogram.     RECOMMENDATIONS:    Repeat PSA in 6 months with T per his preference.   Keep appt with Urology.          CINDY Pearson  Department of Radiation Oncology  [facility]" "Center    Telephone visit lasted 5 minutes 11:24 to 11: 29 am  \"Provider has audio-video available, but the patient preferred audio-only (or did not consent to video visit).\"        "

## 2025-07-08 NOTE — LETTER
2025      Zander Stallworth  [address]  [place] [facility] [address]      Dear Colleague,    Thank you for referring your patient, Zander Stallworth, to the [facility] RADIATION THERAPY CLINIC. Please see a copy of my visit note below.          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 qH2S1F2, Hallie 3+4=7, PSA=0.2.  Axumin PET/CT scan for LN+.     INTERVAL SINCE COMPLETION OF RADIATION THERAPY: 5 years 4 mo (completed on 3/4/2020)     TYPE OF RADIATION THERAPY ADMINISTERED: 7000 cGy in 35 fractions (4600cGy to LN + Boost)       HORMONAL THERAPY: Plan Indefinite. Lupron 22.5 mg given 2019, 45 mg given 3/18/2020, 2020, 2021, 10/1/2021)     SUBJECTIVE: Mr. Stallworth is a 69 year old man with biochemically and nodally recurrent prostate cancer. His pre-op PSA was 2.7, with highest [place]'s score of 5+3=8. He underwent upfront treatment with RALP and BLND in 2017. There was no evidence of EPE SVI, federica involvement (0/3); and negative margins were achieved.      His post-op PSA was initially undetectable. It mariano to a value of 0.1 on 2/15/2019, and increased to 0.2 on 2019. Axumin PET/CT on 2019 then showed FDG avidity in one right common iliac node and an right internal iliac node. He underwent repeat PLND on 2019, with pathology revealing high grade neuroendocrine tumor in 2/5 right common iliac nodes dissected. This was deemed to be recurrence of his prostate cancer, and he proceeded to undergo salvage radiotherapy in conjunction with ADT. His last Lupron shot was on 10/1/2021. He was seen in follow-up with his medical oncologist,   "Miguel, 1/3/2022 at which time they discussed stopping ADT until the testosterone starts to rise again.    In 2022  he reported mild intermittent hematuria. He established care with Dr. Owens and underwent cystoscopy on 3/10/2022, with findings consistent with radiation cystitis.     Did PT for urinary leakage. This helped a lot and is wearing a thin pad now.     Since I saw him last he has recurrence of blood in urine several times per week over the last few weeks, saw Que Ferguson 6/26/25.  CT urogram ordered and scheduled later this month    His libido is recovering. He cannot achieve an erection related to surgery, and he tried to pump and was not satisfied with that.  Saw urology for injections in August.       OBJECTIVE:   193.5 lb  5'10\"    Vitals:  No vitals were obtained today due to virtual visit.    Physical Exam   GENERAL: Healthy, alert and no distress  Breathing comfortably on room air  PSYCH: Mentation appears normal, affect normal/bright, judgement and insight intact, normal speech and affect    7/1/25                      < 0.01         302    12/23/24           < 0.01     6/18/24           < 0.01                    267   12/7/23   <0.01                                            6/7/2023 <0.01                                   373   12/16/2022 <0.01          Testosterone 324   07/07/2022 <0.01   12/23/2021 <0.01   10/01/2021 Lupron 45mg   05/03/2021 <0.01   04/02/2021 Lupron 45 mg   11/13/2020 <0.01   09/16/2020 Lupron 45 mg   05/08/2020 <0.01   03/18/2020 Lupron 45 mg   03/04/2020 Salvage Radiotherapy   12/17/2019 Lupron 22.5 mg started   12/17/2020 (pre-RT) 0.34   09/30/2019 0.2   08/29/2019 0.2   05/20/2019 0.1   02/14/2019 0.1   08/14/2018 <0.1   02/12/2018 <0.1   11/14/2017 (post-op) <0.1   08/07/2017 RARP   05/12/2017 2.7   06/10/2015 1.99   09/26/2014 1.64      IMPRESSION: PSA remains undetectable. No clinical evidence for recurrent disease at this time. Gross hematuria, saw Urology PA who " "ordered Ct Urogram.     RECOMMENDATIONS:    Repeat PSA in 6 months with T per his preference.   Keep appt with Urology.          CINDY Pearson  Department of Radiation Oncology  [facility]    Telephone visit lasted 5 minutes 11:24 to 11: 29 am  \"Provider has audio-video available, but the patient preferred audio-only (or did not consent to video visit).\"          Again, thank you for allowing me to participate in the care of your patient.        Sincerely,        Melba Ceballos PA-C    Electronically signed"

## 2025-07-15 ENCOUNTER — ANCILLARY PROCEDURE (OUTPATIENT)
Dept: CT IMAGING | Facility: CLINIC | Age: 70
End: 2025-07-15
Attending: STUDENT IN AN ORGANIZED HEALTH CARE EDUCATION/TRAINING PROGRAM
Payer: COMMERCIAL

## 2025-07-15 DIAGNOSIS — R31.0 GROSS HEMATURIA: ICD-10-CM

## 2025-07-15 LAB
CREAT BLD-MCNC: 0.9 MG/DL (ref 0.7–1.2)
EGFRCR SERPLBLD CKD-EPI 2021: >60 ML/MIN/1.73M2

## 2025-07-15 PROCEDURE — 74178 CT ABD&PLV WO CNTR FLWD CNTR: CPT | Performed by: STUDENT IN AN ORGANIZED HEALTH CARE EDUCATION/TRAINING PROGRAM

## 2025-07-15 RX ORDER — IOPAMIDOL 755 MG/ML
96 INJECTION, SOLUTION INTRAVASCULAR ONCE
Status: COMPLETED | OUTPATIENT
Start: 2025-07-15 | End: 2025-07-15

## 2025-07-15 RX ADMIN — IOPAMIDOL 96 ML: 755 INJECTION, SOLUTION INTRAVASCULAR at 17:01

## 2025-07-15 NOTE — DISCHARGE INSTRUCTIONS

## 2025-07-16 ENCOUNTER — MYC MEDICAL ADVICE (OUTPATIENT)
Dept: UROLOGY | Facility: CLINIC | Age: 70
End: 2025-07-16
Payer: COMMERCIAL

## 2025-07-21 NOTE — TELEPHONE ENCOUNTER
Patient confirmed scheduled appointment:  Date: 9/8  Time: 12;15pm  Visit type: Cystoscopy  Provider: Cesar Ferguson  Location: [facility]  Testing/imaging: N/a  Additional notes: N/a

## 2025-08-16 ENCOUNTER — HEALTH MAINTENANCE LETTER (OUTPATIENT)
Age: 70
End: 2025-08-16

## (undated) DEVICE — SU PROLENE 5-0 RB-1DA 36"  8556H

## (undated) DEVICE — SU MONOCRYL 4-0 PS-2 27" UND Y426H

## (undated) DEVICE — KIT PATIENT POSITIONING PIGAZZI LATEX FREE 40580

## (undated) DEVICE — Device

## (undated) DEVICE — TUBING CONMED AIRSEAL SMOKE EVAC INSUFFLATION ASM-EVAC

## (undated) DEVICE — PROTECTOR ARM ONE-STEP TRENDELENBURG 40418

## (undated) DEVICE — DRAPE U SPLIT 74X120" 29440

## (undated) DEVICE — SUCTION IRR STRYKERFLOW II W/TIP 250-070-520

## (undated) DEVICE — SU VICRYL 0 CT-1 27" UND J260H

## (undated) DEVICE — LINEN TOWEL PACK X30 5481

## (undated) DEVICE — SYSTEM CLEARIFY VISUALIZATION 21-345

## (undated) DEVICE — GLOVE PROTEXIS W/NEU-THERA 7.5  2D73TE75

## (undated) DEVICE — ADH SKIN CLOSURE PREMIERPRO EXOFIN 1.0ML 3470

## (undated) DEVICE — ENDO POUCH UNIV RETRIEVAL SYSTEM INZII 10MM CD001

## (undated) DEVICE — ESU ENDO SCISSORS 5MM CVD 5DCS

## (undated) DEVICE — NDL INSUFFLATION 13GA 120MM C2201

## (undated) DEVICE — DRAPE SHEET REV FOLD 3/4 9349

## (undated) DEVICE — DAVINCI XI SEAL UNIVERSAL 5-8MM 470361

## (undated) DEVICE — PACK GOWN 3/PK DISP XL SBA32GPFCB

## (undated) DEVICE — PACK DAVINCI UROL

## (undated) DEVICE — DAVINCI XI DRAPE COLUMN 470341

## (undated) DEVICE — SU VICRYL 3-0 SH 27" J316H

## (undated) DEVICE — SUCTION MANIFOLD DORNOCH ULTRA CART UL-CL500

## (undated) DEVICE — DAVINCI XI DRAPE ARM 470015

## (undated) DEVICE — ENDO TROCAR CONMED AIRSEAL BLADELESS 12X120MM IAS12-120LP

## (undated) DEVICE — DRAPE IOBAN INCISE 23X17" 6650EZ

## (undated) DEVICE — SOL NACL 0.9% INJ 1000ML BAG 2B1324X

## (undated) DEVICE — LINEN TOWEL PACK X6 WHITE 5487

## (undated) DEVICE — SU PROLENE 4-0 SHDA 36" 8521H

## (undated) RX ORDER — CEFAZOLIN SODIUM 1 G/3ML
INJECTION, POWDER, FOR SOLUTION INTRAMUSCULAR; INTRAVENOUS
Status: DISPENSED
Start: 2019-11-19

## (undated) RX ORDER — ONDANSETRON 2 MG/ML
INJECTION INTRAMUSCULAR; INTRAVENOUS
Status: DISPENSED
Start: 2019-11-19

## (undated) RX ORDER — EPHEDRINE SULFATE 50 MG/ML
INJECTION, SOLUTION INTRAMUSCULAR; INTRAVENOUS; SUBCUTANEOUS
Status: DISPENSED
Start: 2019-11-19

## (undated) RX ORDER — DEXAMETHASONE SODIUM PHOSPHATE 4 MG/ML
INJECTION, SOLUTION INTRA-ARTICULAR; INTRALESIONAL; INTRAMUSCULAR; INTRAVENOUS; SOFT TISSUE
Status: DISPENSED
Start: 2019-11-19

## (undated) RX ORDER — FENTANYL CITRATE 50 UG/ML
INJECTION, SOLUTION INTRAMUSCULAR; INTRAVENOUS
Status: DISPENSED
Start: 2019-11-19

## (undated) RX ORDER — LIDOCAINE HYDROCHLORIDE 20 MG/ML
INJECTION, SOLUTION EPIDURAL; INFILTRATION; INTRACAUDAL; PERINEURAL
Status: DISPENSED
Start: 2019-11-19

## (undated) RX ORDER — HYDROMORPHONE HYDROCHLORIDE 1 MG/ML
INJECTION, SOLUTION INTRAMUSCULAR; INTRAVENOUS; SUBCUTANEOUS
Status: DISPENSED
Start: 2019-11-19

## (undated) RX ORDER — CEFAZOLIN SODIUM 2 G/100ML
INJECTION, SOLUTION INTRAVENOUS
Status: DISPENSED
Start: 2019-11-19

## (undated) RX ORDER — PHENYLEPHRINE HCL IN 0.9% NACL 1 MG/10 ML
SYRINGE (ML) INTRAVENOUS
Status: DISPENSED
Start: 2019-11-19

## (undated) RX ORDER — GLYCOPYRROLATE 0.2 MG/ML
INJECTION, SOLUTION INTRAMUSCULAR; INTRAVENOUS
Status: DISPENSED
Start: 2019-11-19

## (undated) RX ORDER — PROPOFOL 10 MG/ML
INJECTION, EMULSION INTRAVENOUS
Status: DISPENSED
Start: 2019-11-19